# Patient Record
Sex: FEMALE | Race: WHITE | NOT HISPANIC OR LATINO | Employment: FULL TIME | URBAN - METROPOLITAN AREA
[De-identification: names, ages, dates, MRNs, and addresses within clinical notes are randomized per-mention and may not be internally consistent; named-entity substitution may affect disease eponyms.]

---

## 2020-03-30 ENCOUNTER — OFFICE VISIT CONVERTED (OUTPATIENT)
Dept: OTHER | Facility: HOSPITAL | Age: 45
End: 2020-03-30
Attending: NURSE PRACTITIONER

## 2020-05-13 ENCOUNTER — OFFICE VISIT CONVERTED (OUTPATIENT)
Dept: ORTHOPEDIC SURGERY | Facility: CLINIC | Age: 45
End: 2020-05-13
Attending: ORTHOPAEDIC SURGERY

## 2020-06-03 ENCOUNTER — TELEMEDICINE CONVERTED (OUTPATIENT)
Dept: ORTHOPEDIC SURGERY | Facility: CLINIC | Age: 45
End: 2020-06-03
Attending: PHYSICIAN ASSISTANT

## 2020-07-20 ENCOUNTER — CONVERSION ENCOUNTER (OUTPATIENT)
Dept: ORTHOPEDIC SURGERY | Facility: CLINIC | Age: 45
End: 2020-07-20

## 2020-07-20 ENCOUNTER — OFFICE VISIT CONVERTED (OUTPATIENT)
Dept: ORTHOPEDIC SURGERY | Facility: CLINIC | Age: 45
End: 2020-07-20
Attending: PHYSICIAN ASSISTANT

## 2020-08-17 ENCOUNTER — CONVERSION ENCOUNTER (OUTPATIENT)
Dept: OTHER | Facility: HOSPITAL | Age: 45
End: 2020-08-17

## 2020-09-01 ENCOUNTER — HOSPITAL ENCOUNTER (OUTPATIENT)
Dept: GENERAL RADIOLOGY | Facility: HOSPITAL | Age: 45
Discharge: HOME | End: 2020-09-01
Attending: PHYSICIAN ASSISTANT

## 2020-09-14 ENCOUNTER — OFFICE VISIT CONVERTED (OUTPATIENT)
Dept: ORTHOPEDIC SURGERY | Facility: CLINIC | Age: 45
End: 2020-09-14
Attending: PHYSICIAN ASSISTANT

## 2020-09-14 ENCOUNTER — CONVERSION ENCOUNTER (OUTPATIENT)
Dept: ORTHOPEDIC SURGERY | Facility: CLINIC | Age: 45
End: 2020-09-14

## 2020-09-30 ENCOUNTER — OFFICE VISIT CONVERTED (OUTPATIENT)
Dept: ORTHOPEDIC SURGERY | Facility: CLINIC | Age: 45
End: 2020-09-30
Attending: PHYSICIAN ASSISTANT

## 2020-10-14 ENCOUNTER — OFFICE VISIT CONVERTED (OUTPATIENT)
Dept: ORTHOPEDIC SURGERY | Facility: CLINIC | Age: 45
End: 2020-10-14
Attending: PHYSICIAN ASSISTANT

## 2020-10-14 ENCOUNTER — CONVERSION ENCOUNTER (OUTPATIENT)
Dept: ORTHOPEDIC SURGERY | Facility: CLINIC | Age: 45
End: 2020-10-14

## 2020-10-26 ENCOUNTER — CONVERSION ENCOUNTER (OUTPATIENT)
Dept: ORTHOPEDIC SURGERY | Facility: CLINIC | Age: 45
End: 2020-10-26

## 2020-10-26 ENCOUNTER — OFFICE VISIT CONVERTED (OUTPATIENT)
Dept: ORTHOPEDIC SURGERY | Facility: CLINIC | Age: 45
End: 2020-10-26
Attending: PHYSICIAN ASSISTANT

## 2021-05-10 NOTE — H&P
History and Physical      Patient Name: Beth Mullins   Patient ID: [id number]   Sex: Female   YOB: 1975    Primary Care Provider: Navjot Sims MD    Visit Date: March 30, 2020    Provider: ORALIA Cortés   Location: [facility]   Location Address: [address] [place] [address]  [id number]   Location Phone: [phone]          Chief Complaint  · Evaluation for Respiratory Virus      History Of Present Illness  Beth Mullins is a 44 year old /White female who presents today to the clinic for evaluation of a respiratory virus.   Date of Onset: 03/24/2020   What Symptoms: cough, fever, and shortness of breath   Quality of Symptoms: Dry cough. T-max 99.3   Anything make it worse or better: Nothing   Severity of Symptoms: Moderate   Any known Exposure to COVID-19: Possibly exposed to a coworker with covid19      Patient presents the respiratory virus evaluation center today for cough, congestion, shortness of breath, fever with a T-max of 99.3.  Patient reports this started 6 days ago.  Patient had called her primary care office today and was prescribed Z-Abel, Bromfed, Zofran.  Patient has been taking Tylenol over-the-counter for fevers.       Past Medical History  Anemia, Unspecified; Hypertension         Past Surgical History  Cesarian Section; Hysterectomy         Medication List  fluticasone propionate 50 mcg/actuation nasal spray,suspension; hydrochlorothiazide 12.5 mg oral capsule; lisinopril 10 mg oral tablet; loratadine 10 mg oral tablet         Allergy List  morphine         Family Medical History  Diabetes, unspecified type; Blood Diseases; Family history of certain chronic disabling diseases; arthritis         Social History  Alcohol Use (Current some day); Claustophobic (Unknown); lives with spouse; .; Recreational Drug Use (Never); Student.; Tobacco (Current every day)         Review of  Systems  · Constitutional  o Admits  o : fatigue, fever  o Denies  o : weight gain, weight loss  · HENT  o Admits  o : nasal congestion  o Denies  o : headaches, vertigo, recent head injury, sinus pain, nasal discharge, sore throat, ear pain, ear fullness  · Respiratory  o Admits  o : shortness of breath, cough  o Denies  o : pneumonia, COPD, asthma  · Gastrointestinal  o Denies  o : nausea, vomiting, diarrhea, constipation, abdominal pain  · Integument  o Denies  o : rash  · Neurologic  o Denies  o : headache      Vitals  Date Time BP Position Site L\R Cuff Size HR RR TEMP (F) WT  HT  BMI kg/m2 BSA m2 O2 Sat HC       03/30/2020 02:28 PM      101 - R  98.7     97 %          Physical Examination  · Constitutional  o Appearance  o : no acute distress, well-nourished  · Head and Face  o Head  o :   § Inspection  § : atraumatic, normocephalic  · Eyes  o Eyes  o : extraocular movements intact, no scleral icterus, no conjunctival injection  · Ears, Nose, Mouth and Throat  o Ears  o :   § External Ears  § : normal  § Otoscopic Examination  § : normal tympanic membrane exam  o Nose  o :   § Intranasal Exam  § : sinuses nontender to palpation  o Oral Cavity  o :   § Oral Mucosa  § : moist mucous membranes  o Throat  o :   § Oropharynx  § : normal tonsils, normal oropharynx  · Respiratory  o Respiratory Effort  o : breathing comfortably, symmetric chest rise  o Auscultation of Lungs  o : clear to asculatation bilaterally, no wheezes, rales, or rhonchii  · Cardiovascular  o Heart  o :   § Auscultation of Heart  § : regular rate and rhythm, no murmurs, rubs, or gallops  o Peripheral Vascular System  o :   § Extremities  § : no edema  · Lymphatic  o Neck  o : no lymphadenopathy present  o Supraclavicular Nodes  o : no supraclavicular nodes  · Skin and Subcutaneous Tissue  o General Inspection  o : normal inspection  · Neurologic  o Mental Status Examination  o :   § Orientation  § : grossly oriented to person, place and  time  o Gait and Station  o :   § Gait Screening  § : normal gait  · Psychiatric  o General  o : normal mood and affect          Results  · In-Office Procedures  o Lab procedure  § IOP - Influenza A/B Test (57109)   § Influenza A: Negative   § Influenza B: Positive   § Internal Control Verified?: Yes   § Rapid group A Streptococcus screen (85824)   § Beta Strep Gp A Culture: Negative   § Internal Control Verified?: Yes       Assessment  · Cough     786.2/R05  Patient encouraged to go ahead and take her Z-Abel and Bromfed due to the length of symptoms to help treat possible pneumonia.  · Influenza B     487.1/J10.1  Educated patient on the flu and symptoms that are common. Past the window for Tamiflu.    Discussed symptomatic treatment. Encouraged to get plenty of rest, drink plenty of fluids, enough so that your urine is light yellow or clear like water. Take an over-the-counter pain medicine if needed, such as acetaminophen (Tylenol), ibuprofen (Advil, Motrin), to relieve fever, headache, and muscle aches.    To avoid spreading the flu educated wash your hands regularly, and keep your hands away from your face. Stay home from school, work, and other public     Problems Reconciled  Plan  · Medications  o Medications have been Reconciled  o Transition of Care or Provider Policy  · Instructions  o Chronic conditions reviewed and taken into consideration for today's treatment plan.   o Plan of Care:   o Patient instructed to seek medical attention urgently for new or worsening symptoms.  o Self-monitoring for 14 days. Instructions given.  o Recommends over the counter medications for symptom management.  o Follow-up with PCP in 2-3 days.  o Electronically Identified Patient Education Materials Provided Electronically  · Disposition  o Call or Return if symptoms worsen or persist.  o As Needed for Follow Up            Electronically Signed by: Viridiana Christian, APRN -Author on March 30, 2020 02:52:32 PM

## 2021-05-10 NOTE — H&P
History and Physical      Patient Name: Beth Mullins   Patient ID: [id number]   Sex: Female   YOB: 1975    Referring Provider: Amber BARTON    Visit Date: May 13, 2020    Provider: Don Acuna MD   Location: [facility]   Location Address: [address] [place] [address]  [id number]   Location Phone: [phone]          Chief Complaint  · Left Knee Pain      History Of Present Illness  Beth Mullins is a 44 year old /White female who presents today to [facility].      She's complaining of left knee pain. She woke up 1.5 weeks ago with posterior knee pain. It's on the backside and lateral side. She had an ultrasound through primary doctor because she was concerned about a blood clot due to driving a lot. That came back negative. It showed that she had a Baker's cyst as well.       Past Medical History  Anemia, Unspecified; Hypertension         Past Surgical History  Cesarian Section; Hysterectomy         Medication List  diclofenac sodium 75 mg oral tablet,delayed release (DR/EC); fluticasone propionate 50 mcg/actuation nasal spray,suspension; hydrochlorothiazide 12.5 mg oral capsule; ibuprofen 800 mg oral tablet; lisinopril 10 mg oral tablet; loratadine 10 mg oral tablet         Allergy List  morphine         Family Medical History  Diabetes, unspecified type; Blood Diseases; Family history of certain chronic disabling diseases; arthritis         Social History  Alcohol Use (Current some day); Claustophobic (Unknown); lives with spouse; .; Recreational Drug Use (Never); Student.; Tobacco (Current every day)         Review of Systems  · Constitutional  o Denies  o : fever, chills, weight loss  · Cardiovascular  o Denies  o : chest pain, shortness of breath  · Gastrointestinal  o Denies  o : liver disease, heartburn, nausea, blood in stools  · Genitourinary  o Denies  o : painful urination, blood in urine  · Integument  o Denies  o : rash,  "itching  · Neurologic  o Denies  o : headache, weakness, loss of consciousness  · Musculoskeletal  o Denies  o : painful, swollen joints  · Psychiatric  o Denies  o : drug/alcohol addiction, anxiety, depression      Vitals  Date Time BP Position Site L\R Cuff Size HR RR TEMP (F) WT  HT  BMI kg/m2 BSA m2 O2 Sat        05/13/2020 08:28 AM      102 - R   222lbs 2oz 4'  11\" 44.86 2.05 99 %          Physical Examination  · Constitutional  o Appearance  o : well developed, well-nourished, no obvious deformities present  · Head and Face  o Head  o :   § Inspection  § : normocephalic  o Face  o :   § Inspection  § : no facial lesions  · Eyes  o Conjunctivae  o : conjunctivae normal  o Sclerae  o : sclerae white  · Ears, Nose, Mouth and Throat  o Ears  o :   § External Ears  § : appearance within normal limits  § Hearing  § : intact  o Nose  o :   § External Nose  § : appearance normal  · Neck  o Inspection/Palpation  o : normal appearance  o Range of Motion  o : full range of motion  · Respiratory  o Respiratory Effort  o : breathing unlabored  o Inspection of Chest  o : normal appearance  o Auscultation of Lungs  o : no audible wheezing or rales  · Cardiovascular  o Heart  o : regular rate  · Gastrointestinal  o Abdominal Examination  o : soft and non-tender  · Skin and Subcutaneous Tissue  o General Inspection  o : intact, no rashes  · Psychiatric  o General  o : Alert and oriented x3  o Judgement and Insight  o : judgment and insight intact  o Mood and Affect  o : mood normal, affect appropriate  · Left Knee  o Inspection  o : Full knee ROM. Non-tender about medial and lateral joint line. Negative patellofemoral pain. Tenderness posteriorly on the hamstrings and gastrocnemius. Tenderness along posterolateral side of leg. Negative Lachman and Posterior Sag. Stable to valgus/varus stress. Good strength of the quadriceps, hamstrings, dorsiflexors, and plantar flexors. Sensation grossly intact.   · Injection " Note/Aspiration Note  o Site  o : IM  o Procedure  o : Procedure: After educating the patient, patient gave consent for the procedure. After using alcohol prep, injection was given. The patient tolerated the procedure well.   o Medication  o : 2ml's of 4 mg Dexamethasone   · In Office Procedures  o View  o : LAT/SUNRISE/STANDING   o Site  o : left, knee  o Indication  o : Left knee pain   o Study  o : X-rays ordered, taken in the office, and reviewed today.  o Xray  o : Mild to moderate degenerative changes of patellofemoral joint and medial tibiofemoral compartment.   o Comparative Data  o : No comparative data found          Assessment  · Left knee pain, unspecified chronicity     719.46/M25.562  · Baker's cyst of knee, left     727.51/M71.22      Plan  · Orders  o 2.00 - Dexamethasone Injection 8mg (-4) - - 05/13/2020   Lot [id number] Exp 02 2021 [employer] Administered by CHAYITO Akins MA  o IM/SQ - Injection Fee [facility] (14284) - - 05/13/2020  o Knee (Left) [facility] ([id number]-PY) - 719.46/M25.562 - 05/13/2020  · Medications  o Medications have been Reconciled  o Transition of Care or Provider Policy  · Instructions  o Reviewed the patient's Past Medical, Social, and Family history as well as the ROS at today's visit, no changes.  o Call or return if worsening symptoms.  o Exercise handout given.  o This note was transcribed by Yasmine Callahan. [email or web address]  o We discussed conservative management for now. I'm going to put her on Diclofenac. I gave her some stretching for the hamstrings and IT band. Although Pruitt's cysts are normally asymptomatic, it may be leaking, in which that could cause some acute pain. For that, we are just going to treat conservatively. IM steroid injection. I gave her a prescription for physical therapy. If failing to improve, follow up with me in 4-5 weeks. At that point, we may consider an MRI if failing to improve.  o Electronically Identified Patient Education Materials Provided  Electronically            Electronically Signed by: Yasmine Callahan - , Other -Author on May 18, 2020 10:40:11 AM  Electronically Co-signed by: Don Acuna MD -Reviewer on May 18, 2020 04:40:36 PM

## 2021-05-13 NOTE — PROGRESS NOTES
Progress Note      Patient Name: Beth Mullins   Patient ID: [id number]   Sex: Female   YOB: 1975    Referring Provider: Amber BARTON    Visit Date: October 26, 2020    Provider: Kelsey Wilson PA-C   Location: [facility]   Location Address: [address] [place] [address]  [id number]   Location Phone: [phone]          Chief Complaint  · Left knee pain       History Of Present Illness  Beth Mullins is a 44 year old /White female who presents today to [facility].      She is here for left knee Euflexxa #3/3. She states some improvement so far.       Past Medical History  Anemia, Unspecified; Arthritis; Hypertension; Seasonal allergies         Past Surgical History  Cesarian Section; Hysterectomy         Medication List  cyclobenzaprine 10 mg oral tablet; fluticasone propionate 50 mcg/actuation nasal spray,suspension; hydrochlorothiazide 12.5 mg oral capsule; ibuprofen 800 mg oral tablet; lisinopril 10 mg oral tablet; loratadine 10 mg oral tablet; Medrol (Abel) 4 mg oral tablets,dose pack; nabumetone 750 mg oral tablet         Allergy List  morphine       Allergies Reconciled  Family Medical History  Diabetes, unspecified type; Blood Diseases; Family history of certain chronic disabling diseases; arthritis; Osteoporosis; Family history of Arthritis         Social History  Alcohol Use (Current some day); Claustophobic (Unknown); lives with children; lives with spouse; .; Recreational Drug Use (Never); Student.; Tobacco (Current every day); Working         Review of Systems  · Constitutional  o Denies  o : fever, chills, weight loss  · Cardiovascular  o Denies  o : chest pain, shortness of breath  · Gastrointestinal  o Denies  o : liver disease, heartburn, nausea, blood in stools  · Genitourinary  o Denies  o : painful urination, blood in urine  · Integument  o Denies  o : rash, itching  · Neurologic  o Denies  o : headache, weakness, loss of  consciousness  · Musculoskeletal  o Admits  o : painful, swollen joints  · Psychiatric  o Denies  o : drug/alcohol addiction, anxiety, depression      Vitals  Date Time BP Position Site L\R Cuff Size HR RR TEMP (F) WT  HT  BMI kg/m2 BSA m2 O2 Sat FR L/min FiO2 HC       10/26/2020 10:23 AM      111 - R   222lbs 16oz 5'   43.55 2.07 98 %            Physical Examination  · Constitutional  o Appearance  o : well developed, well-nourished, no obvious deformities present  · Head and Face  o Head  o :   § Inspection  § : normocephalic  o Face  o :   § Inspection  § : no facial lesions  · Eyes  o Conjunctivae  o : conjunctivae normal  o Sclerae  o : sclerae white  · Ears, Nose, Mouth and Throat  o Ears  o :   § External Ears  § : appearance within normal limits  § Hearing  § : intact  o Nose  o :   § External Nose  § : appearance normal  · Neck  o Inspection/Palpation  o : normal appearance  o Range of Motion  o : full range of motion  · Respiratory  o Respiratory Effort  o : breathing unlabored  o Inspection of Chest  o : normal appearance  o Auscultation of Lungs  o : no audible wheezing or rales  · Cardiovascular  o Heart  o : regular rate  · Gastrointestinal  o Abdominal Examination  o : soft and non-tender  · Skin and Subcutaneous Tissue  o General Inspection  o : intact, no rashes  · Psychiatric  o General  o : Alert and oriented x3  o Judgement and Insight  o : judgment and insight intact  o Mood and Affect  o : mood normal, affect appropriate  · Left Knee  o Inspection  o : Full knee ROM. Non-tender about medial and lateral joint line. Negative patellofemoral pain. Tenderness posteriorly on the hamstrings and gastrocnemius. Tenderness along posterolateral side of leg. Negative Lachman and Posterior Sag. Stable to valgus/varus stress. + Thessaly. Good strength of the quadriceps, hamstrings, dorsiflexors, and plantar flexors. Sensation grossly intact.   · Injection Note/Aspiration Note  o Site  o : left  knee  o Procedure  o : Procedure: After educating the patient, patient gave consent for procedure. After using Chloraprep, the joint space was injected. The patient tolerated the procedure well.  o Medication  o : Euflexxa, 20 mg              Assessment  · Primary osteoarthritis of left knee     715.16/M17.12      Plan  · Orders  o Euflexxa per dose (Patient supplies med) () - 715.16/M17.12 - 10/26/2020   Lot [id number] Exp 05 31 2021 [employer] Administered by KELSEY PALENCIA PA-C   o Knee Intra-articular Injection without US [facility] (01024) - 715.16/M17.12 - 10/26/2020   Administered by KELSEY PALENCIA PA-C   · Medications  o Medications have been Reconciled  o Transition of Care or Provider Policy  · Instructions  o Reviewed the patient's Past Medical, Social, and Family history as well as the ROS at today's visit, no changes.  o Call or return if worsening symptoms.  o Follow up PRN. She would like to avoid surgery if possible.   o Electronically Identified Patient Education Materials Provided Electronically            Electronically Signed by: Kelsey Palencia PA-C -Author on October 26, 2020 10:31:50 AM  Electronically Co-signed by: Dno Acuna MD -Reviewer on October 26, 2020 08:50:32 PM

## 2021-05-13 NOTE — PROGRESS NOTES
Progress Note      Patient Name: Beth Mullins   Patient ID: [id number]   Sex: Female   YOB: 1975    Referring Provider: Amber BARTON    Visit Date: Janet 3, 2020    Provider: Kelsey Wilson PA-C   Location: [facility]   Location Address: [address] [place] [address]  [id number]   Location Phone: [phone]          Chief Complaint  · Left Knee pain      History Of Present Illness  Video Conferencing Visit  Beth Mullins is a 44 year old /White female who is presenting for evaluation via video conferencing via Zoom. Verbal consent obtained before beginning visit.   The following staff were present during this visit: Kelsey Wilson PA-C      She is here for follow up for left knee pain. She states IM injection helped for 2 days. She bent down a couple days after her visit and felt a pop in the back of her knee. She states posterior pain and tightness in anterior knee.       Past Medical History  Anemia, Unspecified; Hypertension         Past Surgical History  Cesarian Section; Hysterectomy         Medication List  diclofenac sodium 75 mg oral tablet,delayed release (DR/EC); fluticasone propionate 50 mcg/actuation nasal spray,suspension; hydrochlorothiazide 12.5 mg oral capsule; ibuprofen 800 mg oral tablet; lisinopril 10 mg oral tablet; loratadine 10 mg oral tablet         Allergy List  morphine       Allergies Reconciled  Family Medical History  Diabetes, unspecified type; Blood Diseases; Family history of certain chronic disabling diseases; arthritis         Social History  Alcohol Use (Current some day); Claustophobic (Unknown); lives with spouse; .; Recreational Drug Use (Never); Student.; Tobacco (Current every day)         Review of Systems  · Constitutional  o Denies  o : fever, chills, weight loss  · Cardiovascular  o Denies  o : chest pain, shortness of breath  · Gastrointestinal  o Denies  o : liver disease, heartburn, nausea, blood in  "stools  · Genitourinary  o Denies  o : painful urination, blood in urine  · Integument  o Denies  o : rash, itching  · Neurologic  o Denies  o : headache, weakness, loss of consciousness  · Musculoskeletal  o Admits  o : painful, swollen joints  · Psychiatric  o Denies  o : drug/alcohol addiction, anxiety, depression      Vitals  Date Time BP Position Site L\R Cuff Size HR RR TEMP (F) WT  HT  BMI kg/m2 BSA m2 O2 Sat HC       06/03/2020 08:45 AM         223lbs 16oz 4'  11\" 45.24 2.06           Physical Examination  · Constitutional  o Appearance  o : well developed, well-nourished, no obvious deformities present  · Head and Face  o Head  o :   § Inspection  § : normocephalic  o Face  o :   § Inspection  § : no facial lesions  · Eyes  o Conjunctivae  o : conjunctivae normal  o Sclerae  o : sclerae white  · Ears, Nose, Mouth and Throat  o Ears  o :   § External Ears  § : appearance within normal limits  § Hearing  § : intact  o Nose  o :   § External Nose  § : appearance normal  · Neck  o Inspection/Palpation  o : normal appearance  o Range of Motion  o : full range of motion  · Respiratory  o Respiratory Effort  o : breathing unlabored  o Inspection of Chest  o : normal appearance  o Auscultation of Lungs  o : no audible wheezing or rales  · Skin and Subcutaneous Tissue  o General Inspection  o : intact, no rashes  · Psychiatric  o General  o : Alert and oriented x3  o Judgement and Insight  o : judgment and insight intact  o Mood and Affect  o : mood normal, affect appropriate  · Left Knee  o Inspection  o : No gross deformity. Full weight bearing.           Assessment  · Left knee pain, unspecified chronicity     719.46/M25.562      Plan  · Medications  o Medications have been Reconciled  o Transition of Care or Provider Policy  · Instructions  o Reviewed the patient's Past Medical, Social, and Family history as well as the ROS at today's visit, no changes.  o Call or return if worsening symptoms.  o Follow up for " left knee injection.   o Electronically Identified Patient Education Materials Provided Electronically            Electronically Signed by: MARCO ANTONIO Kaur-JEROD -Author on Janet 3, 2020 09:47:29 AM  Electronically Co-signed by: Don Acuna MD -Reviewer on June 8, 2020 09:43:19 AM

## 2021-05-13 NOTE — PROGRESS NOTES
Progress Note      Patient Name: Beth Mullins   Patient ID: [id number]   Sex: Female   YOB: 1975    Referring Provider: Amber BARTON    Visit Date: July 20, 2020    Provider: Kelsey Wilson PA-C   Location: [facility]   Location Address: [address] [place] [address]  [id number]   Location Phone: [phone]          Chief Complaint  · Left knee pain      History Of Present Illness  Beth Mullins is a 44 year old /White female who presents today to [facility].      She is here for left knee pain. She state IM injection wore off after a day. She has Baker's cyst. Her pain is medial. She states pain with walking that does not relent.       Past Medical History  Anemia, Unspecified; Hypertension         Past Surgical History  Cesarian Section; Hysterectomy         Medication List  diclofenac sodium 75 mg oral tablet,delayed release (DR/EC); fluticasone propionate 50 mcg/actuation nasal spray,suspension; hydrochlorothiazide 12.5 mg oral capsule; ibuprofen 800 mg oral tablet; lisinopril 10 mg oral tablet; loratadine 10 mg oral tablet         Allergy List  morphine       Allergies Reconciled  Family Medical History  Diabetes, unspecified type; Blood Diseases; Family history of certain chronic disabling diseases; arthritis         Social History  Alcohol Use (Current some day); Claustophobic (Unknown); lives with spouse; .; Recreational Drug Use (Never); Student.; Tobacco (Current every day)         Review of Systems  · Constitutional  o Denies  o : fever, chills, weight loss  · Cardiovascular  o Denies  o : chest pain, shortness of breath  · Gastrointestinal  o Denies  o : liver disease, heartburn, nausea, blood in stools  · Genitourinary  o Denies  o : painful urination, blood in urine  · Integument  o Denies  o : rash, itching  · Neurologic  o Denies  o : headache, weakness, loss of consciousness  · Musculoskeletal  o Admits  o : painful, swollen  "joints  · Psychiatric  o Denies  o : drug/alcohol addiction, anxiety, depression      Vitals  Date Time BP Position Site L\R Cuff Size HR RR TEMP (F) WT  HT  BMI kg/m2 BSA m2 O2 Sat HC       07/20/2020 01:04 PM      85 - R   224lbs 5oz 4'  11.5\" 44.55 2.07 99 %          Physical Examination  · Constitutional  o Appearance  o : well developed, well-nourished, no obvious deformities present  · Head and Face  o Head  o :   § Inspection  § : normocephalic  o Face  o :   § Inspection  § : no facial lesions  · Eyes  o Conjunctivae  o : conjunctivae normal  o Sclerae  o : sclerae white  · Ears, Nose, Mouth and Throat  o Ears  o :   § External Ears  § : appearance within normal limits  § Hearing  § : intact  o Nose  o :   § External Nose  § : appearance normal  · Neck  o Inspection/Palpation  o : normal appearance  o Range of Motion  o : full range of motion  · Respiratory  o Respiratory Effort  o : breathing unlabored  o Inspection of Chest  o : normal appearance  o Auscultation of Lungs  o : no audible wheezing or rales  · Cardiovascular  o Heart  o : regular rate  · Gastrointestinal  o Abdominal Examination  o : soft and non-tender  · Skin and Subcutaneous Tissue  o General Inspection  o : intact, no rashes  · Psychiatric  o General  o : Alert and oriented x3  o Judgement and Insight  o : judgment and insight intact  o Mood and Affect  o : mood normal, affect appropriate  · Left Knee  o Inspection  o : Full knee ROM. Non-tender about medial and lateral joint line. Negative patellofemoral pain. Tenderness posteriorly on the hamstrings and gastrocnemius. Tenderness along posterolateral side of leg. Negative Lachman and Posterior Sag. Stable to valgus/varus stress. + Thessaly. Good strength of the quadriceps, hamstrings, dorsiflexors, and plantar flexors. Sensation grossly intact.   · Injection Note/Aspiration Note  o Site  o : left knee  o Procedure  o : Procedure: After educating the patient, patient gave consent for " procedure. After using Chloraprep, the joint space was injected. The patient tolerated the procedure well.  o Medication  o : 80 mg of DepoMedrol with 9cc of 1% Lidocaine              Assessment  · Primary osteoarthritis of left knee     715.16/M17.12  · Left knee pain, unspecified chronicity     719.46/M25.562      Plan  · Orders  o Depo-Medrol injection 80mg () - 719.46/M25.562 - 07/20/2020   Lot [id number] Exp 06 2021 [employer] Administered by KELSEY PALENCIA PA-C  o Knee Intra-articular Injection without US Guidance [facility] ([id number]) - 719.46/M25.562 - 07/20/2020   lot [id number] Exp 07 2021 [employer] Administered by KELSEY PALENCIA PA-C  · Medications  o Medications have been Reconciled  o Transition of Care or Provider Policy  · Instructions  o Reviewed the patient's Past Medical, Social, and Family history as well as the ROS at today's visit, no changes.  o Call or return if worsening symptoms.  o Left knee injection today. Continue HEP. Follow up 3-4 weeks. May call to schedule MRI if no improvement.   o Electronically Identified Patient Education Materials Provided Electronically            Electronically Signed by: Kelsey Palencia PA-C -Author on July 20, 2020 01:18:38 PM

## 2021-05-13 NOTE — PROGRESS NOTES
Progress Note      Patient Name: Beth Mullins   Patient ID: [id number]   Sex: Female   YOB: 1975    Referring Provider: Amber BARTON    Visit Date: October 14, 2020    Provider: Kelsey Wilson PA-C   Location: [facility]   Location Address: [address] [place] [address]  [id number]   Location Phone: [phone]          Chief Complaint  · Follow up left knee pain      History Of Present Illness  Beth Mullins is a 44 year old /White female who presents today to [facility].      She is here for #2/3 Euflexxa injection of left knee.       Past Medical History  Anemia, Unspecified; Arthritis; Hypertension; Seasonal allergies         Past Surgical History  Cesarian Section; Hysterectomy         Medication List  cyclobenzaprine 10 mg oral tablet; fluticasone propionate 50 mcg/actuation nasal spray,suspension; hydrochlorothiazide 12.5 mg oral capsule; ibuprofen 800 mg oral tablet; lisinopril 10 mg oral tablet; loratadine 10 mg oral tablet; Medrol (Able) 4 mg oral tablets,dose pack; nabumetone 750 mg oral tablet         Allergy List  morphine       Allergies Reconciled  Family Medical History  Diabetes, unspecified type; Blood Diseases; Family history of certain chronic disabling diseases; arthritis; Osteoporosis; Family history of Arthritis         Social History  Alcohol Use (Current some day); Claustophobic (Unknown); lives with children; lives with spouse; .; Recreational Drug Use (Never); Student.; Tobacco (Current every day); Working         Review of Systems  · Constitutional  o Denies  o : fever, chills, weight loss  · Cardiovascular  o Denies  o : chest pain, shortness of breath  · Gastrointestinal  o Denies  o : liver disease, heartburn, nausea, blood in stools  · Genitourinary  o Denies  o : painful urination, blood in urine  · Integument  o Denies  o : rash, itching  · Neurologic  o Denies  o : headache, weakness, loss of  "consciousness  · Musculoskeletal  o Admits  o : painful, swollen joints  · Psychiatric  o Denies  o : drug/alcohol addiction, anxiety, depression      Vitals  Date Time BP Position Site L\R Cuff Size HR RR TEMP (F) WT  HT  BMI kg/m2 BSA m2 O2 Sat FR L/min FiO2        10/14/2020 09:12 AM      94 - R   221lbs 8oz 4'  11.5\" 43.99 2.05 96 %            Physical Examination  · Constitutional  o Appearance  o : well developed, well-nourished, no obvious deformities present  · Head and Face  o Head  o :   § Inspection  § : normocephalic  o Face  o :   § Inspection  § : no facial lesions  · Eyes  o Conjunctivae  o : conjunctivae normal  o Sclerae  o : sclerae white  · Ears, Nose, Mouth and Throat  o Ears  o :   § External Ears  § : appearance within normal limits  § Hearing  § : intact  o Nose  o :   § External Nose  § : appearance normal  · Neck  o Inspection/Palpation  o : normal appearance  o Range of Motion  o : full range of motion  · Respiratory  o Respiratory Effort  o : breathing unlabored  o Inspection of Chest  o : normal appearance  o Auscultation of Lungs  o : no audible wheezing or rales  · Cardiovascular  o Heart  o : regular rate  · Gastrointestinal  o Abdominal Examination  o : soft and non-tender  · Skin and Subcutaneous Tissue  o General Inspection  o : intact, no rashes  · Psychiatric  o General  o : Alert and oriented x3  o Judgement and Insight  o : judgment and insight intact  o Mood and Affect  o : mood normal, affect appropriate  · Left Knee  o Inspection  o : Full knee ROM. Non-tender about medial and lateral joint line. Negative patellofemoral pain. Tenderness posteriorly on the hamstrings and gastrocnemius. Tenderness along posterolateral side of leg. Negative Lachman and Posterior Sag. Stable to valgus/varus stress. + Thessaly. Good strength of the quadriceps, hamstrings, dorsiflexors, and plantar flexors. Sensation grossly intact.   · Injection Note/Aspiration Note  o Site  o : left knee "   o Procedure  o : Procedure: After educating the patient, patient gave consent for procedure. After using Chloraprep, the joint space was injected. The patient tolerated the procedure well.   o Medication  o : Euflexxa, 20 mg           Assessment  · Primary osteoarthritis of left knee     715.16/M17.12      Plan  · Orders  o Euflexxa per dose () - 715.16/M17.12 - 10/14/2020   Lot [id number] exp 05 31 2021 Norman BERNARD  o Knee Intra-articular Injection without US Guidance [facility] ([id number]) - 715.16/M17.12 - 10/14/2020   Kelsey BERNARD  · Medications  o Medications have been Reconciled  o Transition of Care or Provider Policy  · Instructions  o Reviewed the patient's Past Medical, Social, and Family history as well as the ROS at today's visit, no changes.  o Call or return if worsening symptoms.  o Follow up in 1 week.  o Electronically Identified Patient Education Materials Provided Electronically            Electronically Signed by: MARCO ANTONIO Kaur-C -Author on October 14, 2020 09:38:01 AM  Electronically Co-signed by: Don Acuna MD -Reviewer on October 14, 2020 12:30:56 PM

## 2021-05-13 NOTE — PROGRESS NOTES
Progress Note      Patient Name: Beth Mullins   Patient ID: [id number]   Sex: Female   YOB: 1975    Referring Provider: Amber BARTON    Visit Date: September 14, 2020    Provider: Kelsey Wilson PA-C   Location: [facility]   Location Address: [address] [place] [address]  [id number]   Location Phone: [phone]          History Of Present Illness  Beth Mullins is a 44 year old /White female who presents today to [facility].      She is here for follow up left knee MRI, it revealed significant chondromalacia of medial and patellofemoral compartments and MMT. She had no relief with steroid injection.       Past Medical History  Anemia, Unspecified; Hypertension         Past Surgical History  Cesarian Section; Hysterectomy         Medication List  cyclobenzaprine 10 mg oral tablet; diclofenac sodium 75 mg oral tablet,delayed release (DR/EC); fluticasone propionate 50 mcg/actuation nasal spray,suspension; hydrochlorothiazide 12.5 mg oral capsule; ibuprofen 800 mg oral tablet; lisinopril 10 mg oral tablet; loratadine 10 mg oral tablet; Medrol (Abel) 4 mg oral tablets,dose pack; nabumetone 750 mg oral tablet         Allergy List  morphine       Allergies Reconciled  Family Medical History  Diabetes, unspecified type; Blood Diseases; Family history of certain chronic disabling diseases; arthritis         Social History  Alcohol Use (Current some day); Claustophobic (Unknown); lives with spouse; .; Recreational Drug Use (Never); Student.; Tobacco (Current every day)         Review of Systems  · Constitutional  o Denies  o : fever, chills, weight loss  · Cardiovascular  o Denies  o : chest pain, shortness of breath  · Gastrointestinal  o Denies  o : liver disease, heartburn, nausea, blood in stools  · Genitourinary  o Denies  o : painful urination, blood in urine  · Integument  o Denies  o : rash, itching  · Neurologic  o Denies  o : headache,  weakness, loss of consciousness  · Musculoskeletal  o Admits  o : painful, swollen joints  · Psychiatric  o Denies  o : drug/alcohol addiction, anxiety, depression      Vitals  Date Time BP Position Site L\R Cuff Size HR RR TEMP (F) WT  HT  BMI kg/m2 BSA m2 O2 Sat HC       09/14/2020 11:11 AM      95 - R   222lbs 16oz 5'   43.55 2.07 98 %          Physical Examination  · Constitutional  o Appearance  o : well developed, well-nourished, no obvious deformities present  · Head and Face  o Head  o :   § Inspection  § : normocephalic  o Face  o :   § Inspection  § : no facial lesions  · Eyes  o Conjunctivae  o : conjunctivae normal  o Sclerae  o : sclerae white  · Ears, Nose, Mouth and Throat  o Ears  o :   § External Ears  § : appearance within normal limits  § Hearing  § : intact  o Nose  o :   § External Nose  § : appearance normal  · Neck  o Inspection/Palpation  o : normal appearance  o Range of Motion  o : full range of motion  · Respiratory  o Respiratory Effort  o : breathing unlabored  o Inspection of Chest  o : normal appearance  o Auscultation of Lungs  o : no audible wheezing or rales  · Cardiovascular  o Heart  o : regular rate  · Gastrointestinal  o Abdominal Examination  o : soft and non-tender  · Skin and Subcutaneous Tissue  o General Inspection  o : intact, no rashes  · Psychiatric  o General  o : Alert and oriented x3  o Judgement and Insight  o : judgment and insight intact  o Mood and Affect  o : mood normal, affect appropriate  · Left Knee  o Inspection  o : Full knee ROM. Non-tender about medial and lateral joint line. Negative patellofemoral pain. Tenderness posteriorly on the hamstrings and gastrocnemius. Tenderness along posterolateral side of leg. Negative Lachman and Posterior Sag. Stable to valgus/varus stress. + Thessaly. Good strength of the quadriceps, hamstrings, dorsiflexors, and plantar flexors. Sensation grossly intact.   · Imaging  o Imaging  o : MRI left knee 9/1/20: CONCLUSION: 1.  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 2. Full-thickness radial tear through the medial meniscus posterior root with mild medial protrusion of the meniscal body. 3. Moderate to advanced chondromalacia in the patellofemoral compartment. 4. Moderate joint effusion with diffuse synovial thickening and/or intra-articular debris. 5. Heterogeneous bone marrow signal, likely reflecting red marrow reconversion. Consider correlating with CBC.          Assessment  · Primary osteoarthritis of left knee     715.16/M17.12  · MMT (medial meniscus tear)     836.0/S83.249A  · Left knee pain, unspecified chronicity     719.46/M25.562      Plan  · Medications  o Medications have been Reconciled  o Transition of Care or Provider Policy  · Instructions  o Reviewed the patient's Past Medical, Social, and Family history as well as the ROS at today's visit, no changes.  o Call or return if worsening symptoms.  o Seek prior auth for gel injection. Knee brace provided.   o Electronically Identified Patient Education Materials Provided Electronically            Electronically Signed by: MARCO ANTONIO Kaur-JEROD -Author on September 14, 2020 11:38:52 AM  Electronically Co-signed by: Don Acuna MD -Reviewer on September 14, 2020 05:30:02 PM

## 2021-05-13 NOTE — PROGRESS NOTES
Progress Note      Patient Name: Beth Mullins   Patient ID: [id number]   Sex: Female   YOB: 1975    Referring Provider: Amber BARTON    Visit Date: September 30, 2020    Provider: Kelsey Wilson PA-C   Location: [facility]   Location Address: [address] [place] [address]  [id number]   Location Phone: [phone]          Chief Complaint  · Follow up left knee pain      History Of Present Illness  Beth Mullins is a 44 year old /White female who presents today to [facility].      She is here for left knee Euflexxa injection #1/3.       Past Medical History  Anemia, Unspecified; Arthritis; Hypertension; Seasonal allergies         Past Surgical History  Cesarian Section; Hysterectomy         Medication List  cyclobenzaprine 10 mg oral tablet; fluticasone propionate 50 mcg/actuation nasal spray,suspension; hydrochlorothiazide 12.5 mg oral capsule; ibuprofen 800 mg oral tablet; lisinopril 10 mg oral tablet; loratadine 10 mg oral tablet; Medrol (Abel) 4 mg oral tablets,dose pack; nabumetone 750 mg oral tablet         Allergy List  morphine       Allergies Reconciled  Family Medical History  Diabetes, unspecified type; Blood Diseases; Family history of certain chronic disabling diseases; arthritis; Osteoporosis; Family history of Arthritis         Social History  Alcohol Use (Current some day); Claustophobic (Unknown); lives with children; lives with spouse; .; Recreational Drug Use (Never); Student.; Tobacco (Current every day); Working         Review of Systems  · Constitutional  o Denies  o : fever, chills, weight loss  · Cardiovascular  o Denies  o : chest pain, shortness of breath  · Gastrointestinal  o Denies  o : liver disease, heartburn, nausea, blood in stools  · Genitourinary  o Denies  o : painful urination, blood in urine  · Integument  o Denies  o : rash, itching  · Neurologic  o Denies  o : headache, weakness, loss of  consciousness  · Musculoskeletal  o Admits  o : painful, swollen joints  · Psychiatric  o Denies  o : drug/alcohol addiction, anxiety, depression      Vitals  Date Time BP Position Site L\R Cuff Size HR RR TEMP (F) WT  HT  BMI kg/m2 BSA m2 O2 Sat FR L/min FiO2 HC       09/30/2020 07:44 AM      95 - R   222lbs 16oz 5'   43.55 2.07 97 %            Physical Examination  · Constitutional  o Appearance  o : well developed, well-nourished, no obvious deformities present  · Head and Face  o Head  o :   § Inspection  § : normocephalic  o Face  o :   § Inspection  § : no facial lesions  · Eyes  o Conjunctivae  o : conjunctivae normal  o Sclerae  o : sclerae white  · Ears, Nose, Mouth and Throat  o Ears  o :   § External Ears  § : appearance within normal limits  § Hearing  § : intact  o Nose  o :   § External Nose  § : appearance normal  · Neck  o Inspection/Palpation  o : normal appearance  o Range of Motion  o : full range of motion  · Respiratory  o Respiratory Effort  o : breathing unlabored  o Inspection of Chest  o : normal appearance  o Auscultation of Lungs  o : no audible wheezing or rales  · Cardiovascular  o Heart  o : regular rate  · Gastrointestinal  o Abdominal Examination  o : soft and non-tender  · Skin and Subcutaneous Tissue  o General Inspection  o : intact, no rashes  · Psychiatric  o General  o : Alert and oriented x3  o Judgement and Insight  o : judgment and insight intact  o Mood and Affect  o : mood normal, affect appropriate  · Left Knee  o Inspection  o : Full knee ROM. Non-tender about medial and lateral joint line. Negative patellofemoral pain. Tenderness posteriorly on the hamstrings and gastrocnemius. Tenderness along posterolateral side of leg. Negative Lachman and Posterior Sag. Stable to valgus/varus stress. + Thessaly. Good strength of the quadriceps, hamstrings, dorsiflexors, and plantar flexors. Sensation grossly intact.   · Injection Note/Aspiration Note  o Site  o : left knee    o Procedure  o : Procedure: After educating the patient, patient gave consent for procedure. After using Chloraprep, the joint space was injected. The patient tolerated the procedure well.   o Medication  o : Euflexxa, 20 mg           Assessment  · Primary osteoarthritis of left knee     715.16/M17.12      Plan  · Orders  o Euflexxa per dose () - 715.16/M17.12 - 09/30/2020   Lot [id number] exp 05 31 2021 Norman BERNARD  o Knee Intra-articular Injection without US Guidance [facility] (02411) - 715.16/M17.12 - 09/30/2020   Kelsey BERNARD  · Medications  o Medications have been Reconciled  o Transition of Care or Provider Policy  · Instructions  o Reviewed the patient's Past Medical, Social, and Family history as well as the ROS at today's visit, no changes.  o Call or return if worsening symptoms.  o Follow up in 1 week.  o Electronically Identified Patient Education Materials Provided Electronically            Electronically Signed by: MARCO ANTONIO Kaur-C -Author on September 30, 2020 08:28:23 AM  Electronically Co-signed by: Don Acuna MD -Reviewer on September 30, 2020 11:01:16 PM

## 2021-05-14 VITALS — OXYGEN SATURATION: 98 % | HEIGHT: 60 IN | HEART RATE: 111 BPM | WEIGHT: 223 LBS | BODY MASS INDEX: 43.78 KG/M2

## 2021-05-14 VITALS — WEIGHT: 223 LBS | BODY MASS INDEX: 43.78 KG/M2 | HEART RATE: 95 BPM | OXYGEN SATURATION: 98 % | HEIGHT: 60 IN

## 2021-05-14 VITALS — HEART RATE: 94 BPM | OXYGEN SATURATION: 96 % | HEIGHT: 59 IN | BODY MASS INDEX: 44.65 KG/M2 | WEIGHT: 221.5 LBS

## 2021-05-14 VITALS — HEIGHT: 60 IN | OXYGEN SATURATION: 97 % | HEART RATE: 95 BPM | BODY MASS INDEX: 43.78 KG/M2 | WEIGHT: 223 LBS

## 2021-05-15 VITALS — HEART RATE: 98 BPM | WEIGHT: 220.5 LBS | HEIGHT: 60 IN | OXYGEN SATURATION: 98 % | BODY MASS INDEX: 43.29 KG/M2

## 2021-05-15 VITALS — OXYGEN SATURATION: 97 % | HEART RATE: 101 BPM | TEMPERATURE: 98.7 F

## 2021-05-15 VITALS — WEIGHT: 224.31 LBS | OXYGEN SATURATION: 99 % | HEIGHT: 59 IN | BODY MASS INDEX: 45.22 KG/M2 | HEART RATE: 85 BPM

## 2021-05-15 VITALS — WEIGHT: 222.12 LBS | BODY MASS INDEX: 44.78 KG/M2 | HEART RATE: 102 BPM | OXYGEN SATURATION: 99 % | HEIGHT: 59 IN

## 2021-05-15 VITALS — HEIGHT: 59 IN | WEIGHT: 224 LBS | BODY MASS INDEX: 45.16 KG/M2

## 2021-05-26 ENCOUNTER — TRANSCRIBE ORDERS (OUTPATIENT)
Dept: LAB | Facility: HOSPITAL | Age: 46
End: 2021-05-26

## 2021-05-26 ENCOUNTER — OFFICE VISIT CONVERTED (OUTPATIENT)
Dept: ORTHOPEDIC SURGERY | Facility: CLINIC | Age: 46
End: 2021-05-26
Attending: ORTHOPAEDIC SURGERY

## 2021-05-26 DIAGNOSIS — Z01.818 PRE-OP TESTING: Primary | ICD-10-CM

## 2021-05-27 ENCOUNTER — TRANSCRIBE ORDERS (OUTPATIENT)
Dept: PHYSICAL THERAPY | Facility: CLINIC | Age: 46
End: 2021-05-27

## 2021-05-27 DIAGNOSIS — Z96.652 AFTERCARE FOLLOWING LEFT KNEE JOINT REPLACEMENT SURGERY: ICD-10-CM

## 2021-05-27 DIAGNOSIS — Z96.652 PRESENCE OF LEFT ARTIFICIAL KNEE JOINT: Primary | ICD-10-CM

## 2021-05-27 DIAGNOSIS — Z47.1 AFTERCARE FOLLOWING LEFT KNEE JOINT REPLACEMENT SURGERY: ICD-10-CM

## 2021-06-05 NOTE — PROGRESS NOTES
Progress Note      Patient Name: Beth Mullins   Patient ID: [id number]   Sex: Female   YOB: 1975    Referring Provider: Amber BARTON    Visit Date: May 26, 2021    Provider: Don Acuna MD   Location: [facility]   Location Address: [address] [place] [address]  [id number]   Location Phone: [phone]          Chief Complaint  · Left Knee Osteoarthritis      History Of Present Illness  Beth Mullins is a 45 year old /White female who presents today to [facility].      Patient presents today for a follow-up of left knee. Patient has a history of left knee osteoarthritis that is treated conservatively. The last injections she received in October did not provide her with any relief. She states her left knee pain has been increasing since she has last seen us. She states the quality of her life has plummeted. She is unable to walk around the grocery store without having to sit down. She states after her grocery store trips she is unable to walk the rest of the day. The pain in her left knee is worse at night. She states she has trouble with her knee at work and fears that it will begin to affect her work quality. She states her knee gives way on her and causes her to fall. Patient has left knee pain that limits everyday activity, falls, and difficulty getting out of an automobile, difficulty sleeping, limited activities of daily living such as bathing, dressing, walking, difficulty climbing stairs, and difficulty sitting for long periods of time. Patient works as a .       Past Medical History  Anemia, Unspecified; Arthritis; Hypertension; Seasonal allergies         Past Surgical History  Cesarian Section; Hysterectomy         Medication List  cyclobenzaprine 10 mg oral tablet; fluticasone propionate 50 mcg/actuation nasal spray,suspension; hydrochlorothiazide 12.5 mg oral capsule; ibuprofen 800 mg oral tablet; lisinopril 10 mg oral tablet;  loratadine 10 mg oral tablet; Medrol (Abel) 4 mg oral tablets,dose pack; NABUMETONE 750MG TABLET         Allergy List  morphine       Allergies Reconciled  Family Medical History  Diabetes, unspecified type; Blood Diseases; Family history of certain chronic disabling diseases; arthritis; Osteoporosis; Family history of Arthritis         Social History  Alcohol Use (Current some day); Claustophobic (Unknown); lives with children; lives with spouse; .; Recreational Drug Use (Never); Student.; Tobacco (Current every day); Working         Review of Systems  · Constitutional  o Denies  o : fever, chills, weight loss  · Cardiovascular  o Denies  o : chest pain, shortness of breath  · Gastrointestinal  o Denies  o : liver disease, heartburn, nausea, blood in stools  · Genitourinary  o Denies  o : painful urination, blood in urine  · Integument  o Denies  o : rash, itching  · Neurologic  o Denies  o : headache, weakness, loss of consciousness  · Musculoskeletal  o Denies  o : painful, swollen joints  · Psychiatric  o Denies  o : drug/alcohol addiction, anxiety, depression      Vitals  Date Time BP Position Site L\R Cuff Size HR RR TEMP (F) WT  HT  BMI kg/m2 BSA m2 O2 Sat FR L/min FiO2 HC       05/26/2021 09:31 AM         222lbs 16oz 5'   43.55 2.07             Physical Examination  · Constitutional  o Appearance  o : well developed, well-nourished, no obvious deformities present  · Head and Face  o Head  o :   § Inspection  § : normocephalic  o Face  o :   § Inspection  § : no facial lesions  · Eyes  o Conjunctivae  o : conjunctivae normal  o Sclerae  o : sclerae white  · Ears, Nose, Mouth and Throat  o Ears  o :   § External Ears  § : appearance within normal limits  § Hearing  § : intact  o Nose  o :   § External Nose  § : appearance normal  · Neck  o Inspection/Palpation  o : normal appearance  o Range of Motion  o : full range of motion  · Respiratory  o Respiratory Effort  o : breathing unlabored  o Inspection of  Chest  o : normal appearance  o Auscultation of Lungs  o : no audible wheezing or rales  · Cardiovascular  o Heart  o : regular rate  · Gastrointestinal  o Abdominal Examination  o : soft and non-tender  · Skin and Subcutaneous Tissue  o General Inspection  o : intact, no rashes  · Psychiatric  o General  o : Alert and oriented x3  o Judgement and Insight  o : judgment and insight intact  o Mood and Affect  o : mood normal, affect appropriate  · Left Knee  o Inspection  o : Tenderness posteriorly on the hamstrings and gastrocnemius. Tenderness along posterolateral side of leg. Tender medial and lateral joint line. Positive Thessaly. Negative Lachman. Negative posterior sag. Stable to valgus/varus stress. Good strength in quadriceps, hamstrings, dorsiflexors, and plantar flexors. Sensation grossly intact. Neurovascular intact. Full weight bearing. Limping gait. Skin intact. No swelling, skin discoloration or atrophy. Positive crepitus. Full extension. Flexion to 125.   · In Office Procedures  o View  o : LAT/SUNRISE/STANDING   o Site  o : left, knee  o Indication  o : Left Knee Osteoarthritis  o Study  o : X-rays ordered, taken in the office, and reviewed today.  o Xray  o : Advanced degenerative changes of the left knee that is consistent with bone on bone osteoarthritis. No fracture or dislocation.           Assessment  · Primary osteoarthritis of left knee     715.16/M17.12      Plan  · Orders  o Knee (Left) 3 views X-Ray [facility] Preferred View ([id number]) - 715.16/M17.12 - 05/26/2021  o ACO-17: Screened for tobacco use AND received tobacco cessation intervention (4004F) - - 05/27/2021  · Medications  o Medications have been Reconciled  o Transition of Care or Provider Policy  · Instructions  o Dr. Acuna saw and examined the patient and agrees with plan.   o X-rays reviewed by Dr. Acuna.  o Reviewed the patient's Past Medical, Social, and Family history as well as the ROS at today's visit, no changes.  o Call or return  if worsening symptoms.  o Discussed surgery.  o Risks/benefits discussed with patient including, but not limited to: infection, bleeding, neurovascular damage, malunion, nonunion, aesthetic deformity, need for further surgery, and death.  o Discussed with patient the implant type being used during surgery and patient understands and desires to proceed.  o Surgery pamphlet given.  o Follow Up post-op.  o Educated on risk of smoking related to procedure. Discussed options for smoking cessation.  o Educated on risk of elevated BMI related to procedure. Discussed options for weight loss/decreasing BMI prior to procedure including dietician consult, weight loss options and exercise program.  o Discussed treatment plans and diagnosis with the patient. Discussed operative vs non-operative measures. Patient has failed steroid injections and viscosupplementation. The left knee pain is throwing her hip off and she fears she may be unable to work anymore due to the pain. Patient wishes to proceed with a left total knee arthroplasty. 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   o The above service was scribed by Diana Smiley on my behalf and I attest to the accuracy of the note. [email or web address]  o Electronically Identified Patient Education Materials Provided Electronically            Electronically Signed by: Diana Smiley-, Other -Author on May 27, 2021 09:58:13 AM  Electronically Co-signed by: Don Acuna MD -Reviewer on May 27, 2021 11:05:57 PM

## 2021-06-06 ENCOUNTER — APPOINTMENT (OUTPATIENT)
Dept: CT IMAGING | Facility: HOSPITAL | Age: 46
End: 2021-06-06

## 2021-06-06 ENCOUNTER — HOSPITAL ENCOUNTER (EMERGENCY)
Facility: HOSPITAL | Age: 46
Discharge: HOME | End: 2021-06-07
Attending: NURSE PRACTITIONER | Admitting: EMERGENCY MEDICINE

## 2021-06-06 VITALS
HEIGHT: 59 IN | SYSTOLIC BLOOD PRESSURE: 113 MMHG | OXYGEN SATURATION: 98 % | BODY MASS INDEX: 45.29 KG/M2 | WEIGHT: 224.65 LBS | RESPIRATION RATE: 24 BRPM | HEART RATE: 76 BPM | TEMPERATURE: 98.1 F | DIASTOLIC BLOOD PRESSURE: 70 MMHG

## 2021-06-06 DIAGNOSIS — K30 INDIGESTION: Primary | ICD-10-CM

## 2021-06-06 LAB
ALBUMIN SERPL-MCNC: 4.3 G/DL (ref 3.5–5.2)
ALBUMIN/GLOB SERPL: 1.6 G/DL
ALP SERPL-CCNC: 81 U/L (ref 39–117)
ALT SERPL W P-5'-P-CCNC: 18 U/L (ref 1–33)
ANION GAP SERPL CALCULATED.3IONS-SCNC: 13.7 MMOL/L (ref 5–15)
AST SERPL-CCNC: 14 U/L (ref 1–32)
BACTERIA UR QL AUTO: ABNORMAL /HPF
BASOPHILS # BLD AUTO: 0.12 10*3/MM3 (ref 0–0.2)
BASOPHILS NFR BLD AUTO: 0.9 % (ref 0–1.5)
BILIRUB SERPL-MCNC: <0.2 MG/DL (ref 0–1.2)
BILIRUB UR QL STRIP: NEGATIVE
BUN SERPL-MCNC: 14 MG/DL (ref 6–20)
BUN/CREAT SERPL: 19.2 (ref 7–25)
CALCIUM SPEC-SCNC: 9.7 MG/DL (ref 8.6–10.5)
CHLORIDE SERPL-SCNC: 103 MMOL/L (ref 98–107)
CLARITY UR: CLEAR
CO2 SERPL-SCNC: 21.3 MMOL/L (ref 22–29)
COLOR UR: YELLOW
CREAT SERPL-MCNC: 0.73 MG/DL (ref 0.57–1)
DEPRECATED RDW RBC AUTO: 38.8 FL (ref 37–54)
EOSINOPHIL # BLD AUTO: 0.24 10*3/MM3 (ref 0–0.4)
EOSINOPHIL NFR BLD AUTO: 1.8 % (ref 0.3–6.2)
ERYTHROCYTE [DISTWIDTH] IN BLOOD BY AUTOMATED COUNT: 11.7 % (ref 12.3–15.4)
GFR SERPL CREATININE-BSD FRML MDRD: 87 ML/MIN/1.73
GLOBULIN UR ELPH-MCNC: 2.7 GM/DL
GLUCOSE SERPL-MCNC: 143 MG/DL (ref 65–99)
GLUCOSE UR STRIP-MCNC: NEGATIVE MG/DL
HCT VFR BLD AUTO: 38.8 % (ref 34–46.6)
HGB BLD-MCNC: 13.1 G/DL (ref 12–15.9)
HGB UR QL STRIP.AUTO: ABNORMAL
HOLD SPECIMEN: NORMAL
HOLD SPECIMEN: NORMAL
HYALINE CASTS UR QL AUTO: ABNORMAL /LPF
IMM GRANULOCYTES # BLD AUTO: 0.05 10*3/MM3 (ref 0–0.05)
IMM GRANULOCYTES NFR BLD AUTO: 0.4 % (ref 0–0.5)
KETONES UR QL STRIP: NEGATIVE
LEUKOCYTE ESTERASE UR QL STRIP.AUTO: NEGATIVE
LIPASE SERPL-CCNC: 23 U/L (ref 13–60)
LYMPHOCYTES # BLD AUTO: 2.9 10*3/MM3 (ref 0.7–3.1)
LYMPHOCYTES NFR BLD AUTO: 21.3 % (ref 19.6–45.3)
MCH RBC QN AUTO: 30.8 PG (ref 26.6–33)
MCHC RBC AUTO-ENTMCNC: 33.8 G/DL (ref 31.5–35.7)
MCV RBC AUTO: 91.3 FL (ref 79–97)
MONOCYTES # BLD AUTO: 0.61 10*3/MM3 (ref 0.1–0.9)
MONOCYTES NFR BLD AUTO: 4.5 % (ref 5–12)
NEUTROPHILS NFR BLD AUTO: 71.1 % (ref 42.7–76)
NEUTROPHILS NFR BLD AUTO: 9.69 10*3/MM3 (ref 1.7–7)
NITRITE UR QL STRIP: NEGATIVE
NRBC BLD AUTO-RTO: 0 /100 WBC (ref 0–0.2)
PH UR STRIP.AUTO: 5.5 [PH] (ref 5–8)
PLATELET # BLD AUTO: 487 10*3/MM3 (ref 140–450)
PMV BLD AUTO: 8.8 FL (ref 6–12)
POTASSIUM SERPL-SCNC: 4.4 MMOL/L (ref 3.5–5.2)
PROT SERPL-MCNC: 7 G/DL (ref 6–8.5)
PROT UR QL STRIP: NEGATIVE
RBC # BLD AUTO: 4.25 10*6/MM3 (ref 3.77–5.28)
RBC # UR: ABNORMAL /HPF
REF LAB TEST METHOD: ABNORMAL
SODIUM SERPL-SCNC: 138 MMOL/L (ref 136–145)
SP GR UR STRIP: 1.01 (ref 1–1.03)
SQUAMOUS #/AREA URNS HPF: ABNORMAL /HPF
UROBILINOGEN UR QL STRIP: ABNORMAL
WBC # BLD AUTO: 13.61 10*3/MM3 (ref 3.4–10.8)
WBC UR QL AUTO: ABNORMAL /HPF
WHOLE BLOOD HOLD SPECIMEN: NORMAL

## 2021-06-06 PROCEDURE — 25010000002 ONDANSETRON PER 1 MG: Performed by: NURSE PRACTITIONER

## 2021-06-06 PROCEDURE — 83690 ASSAY OF LIPASE: CPT | Performed by: NURSE PRACTITIONER

## 2021-06-06 PROCEDURE — 81001 URINALYSIS AUTO W/SCOPE: CPT | Performed by: NURSE PRACTITIONER

## 2021-06-06 PROCEDURE — 74177 CT ABD & PELVIS W/CONTRAST: CPT

## 2021-06-06 PROCEDURE — 80053 COMPREHEN METABOLIC PANEL: CPT | Performed by: NURSE PRACTITIONER

## 2021-06-06 PROCEDURE — 99284 EMERGENCY DEPT VISIT MOD MDM: CPT

## 2021-06-06 PROCEDURE — 96374 THER/PROPH/DIAG INJ IV PUSH: CPT

## 2021-06-06 PROCEDURE — 96375 TX/PRO/DX INJ NEW DRUG ADDON: CPT

## 2021-06-06 PROCEDURE — 0 IOPAMIDOL PER 1 ML: Performed by: EMERGENCY MEDICINE

## 2021-06-06 PROCEDURE — 85025 COMPLETE CBC W/AUTO DIFF WBC: CPT | Performed by: NURSE PRACTITIONER

## 2021-06-06 PROCEDURE — 25010000002 KETOROLAC TROMETHAMINE PER 15 MG: Performed by: NURSE PRACTITIONER

## 2021-06-06 RX ORDER — SODIUM CHLORIDE 0.9 % (FLUSH) 0.9 %
10 SYRINGE (ML) INJECTION AS NEEDED
Status: DISCONTINUED | OUTPATIENT
Start: 2021-06-06 | End: 2021-06-06

## 2021-06-06 RX ORDER — KETOROLAC TROMETHAMINE 30 MG/ML
30 INJECTION, SOLUTION INTRAMUSCULAR; INTRAVENOUS ONCE
Status: COMPLETED | OUTPATIENT
Start: 2021-06-06 | End: 2021-06-06

## 2021-06-06 RX ORDER — LIDOCAINE HYDROCHLORIDE 20 MG/ML
15 SOLUTION OROPHARYNGEAL ONCE
Status: COMPLETED | OUTPATIENT
Start: 2021-06-06 | End: 2021-06-06

## 2021-06-06 RX ORDER — LISINOPRIL 10 MG/1
10 TABLET ORAL DAILY
COMMUNITY

## 2021-06-06 RX ORDER — ONDANSETRON 2 MG/ML
4 INJECTION INTRAMUSCULAR; INTRAVENOUS ONCE
Status: COMPLETED | OUTPATIENT
Start: 2021-06-06 | End: 2021-06-06

## 2021-06-06 RX ORDER — ALUMINA, MAGNESIA, AND SIMETHICONE 2400; 2400; 240 MG/30ML; MG/30ML; MG/30ML
15 SUSPENSION ORAL ONCE
Status: COMPLETED | OUTPATIENT
Start: 2021-06-06 | End: 2021-06-06

## 2021-06-06 RX ADMIN — ALUMINUM HYDROXIDE, MAGNESIUM HYDROXIDE, AND DIMETHICONE 15 ML: 400; 400; 40 SUSPENSION ORAL at 21:30

## 2021-06-06 RX ADMIN — LIDOCAINE HYDROCHLORIDE 15 ML: 20 SOLUTION ORAL; TOPICAL at 21:31

## 2021-06-06 RX ADMIN — KETOROLAC TROMETHAMINE 30 MG: 30 INJECTION, SOLUTION INTRAMUSCULAR; INTRAVENOUS at 21:30

## 2021-06-06 RX ADMIN — IOPAMIDOL 100 ML: 755 INJECTION, SOLUTION INTRAVENOUS at 22:49

## 2021-06-06 RX ADMIN — SODIUM CHLORIDE, PRESERVATIVE FREE 10 ML: 5 INJECTION INTRAVENOUS at 21:30

## 2021-06-06 RX ADMIN — ONDANSETRON 4 MG: 2 INJECTION INTRAMUSCULAR; INTRAVENOUS at 21:30

## 2021-06-07 RX ORDER — SUCRALFATE ORAL 1 G/10ML
1 SUSPENSION ORAL
Qty: 420 ML | Refills: 0 | Status: SHIPPED | OUTPATIENT
Start: 2021-06-07 | End: 2022-05-30

## 2021-06-07 NOTE — ED PROVIDER NOTES
"Subjective   Patient reports that she has been having abdominal pain since this morning which has radiated through to the right side of her back.  Also reports that she had diarrhea yesterday and heartburn that started last night.  States that she has been \"eating and acid like candy\" which has not helped at all.  She does state that she has a history of gallstones and kidney stones and that this pain does not feel like her past kidney stones and she thinks it is related to her gallbladder.  She does report that she had one gallbladder attack before and this feels similar.      History provided by:  Patient      Review of Systems   Gastrointestinal: Positive for abdominal pain (Right side radiating around to the right mid back.) and diarrhea (All day yesterday.).        Indigestion/heartburn   All other systems reviewed and are negative.      Past Medical History:   Diagnosis Date   • Hypertension        Allergies   Allergen Reactions   • Morphine Anaphylaxis       Past Surgical History:   Procedure Laterality Date   • ABDOMINAL SURGERY     •  SECTION     • CLAVICLE SURGERY     • CYSTOSCOPY BLADDER STONE LITHOTRIPSY     • HYSTERECTOMY         History reviewed. No pertinent family history.    Social History     Socioeconomic History   • Marital status:      Spouse name: Not on file   • Number of children: Not on file   • Years of education: Not on file   • Highest education level: Not on file   Tobacco Use   • Smoking status: Heavy Tobacco Smoker     Packs/day: 0.50   Vaping Use   • Vaping Use: Never used   Substance and Sexual Activity   • Alcohol use: Never   • Drug use: Never   • Sexual activity: Yes           Objective   Physical Exam  Vitals and nursing note reviewed.   Constitutional:       General: She is not in acute distress.     Appearance: She is well-developed. She is obese. She is not ill-appearing, toxic-appearing or diaphoretic.   HENT:      Head: Normocephalic and atraumatic.   Eyes:    "   Pupils: Pupils are equal, round, and reactive to light.   Cardiovascular:      Rate and Rhythm: Normal rate and regular rhythm.      Heart sounds: Normal heart sounds.   Pulmonary:      Effort: Pulmonary effort is normal. No respiratory distress.      Breath sounds: Normal breath sounds.   Abdominal:      General: Abdomen is protuberant. Bowel sounds are normal. There is no distension or abdominal bruit.      Palpations: Abdomen is soft. There is no mass.      Tenderness: There is abdominal tenderness in the right upper quadrant. There is right CVA tenderness.   Skin:     General: Skin is warm and dry.      Capillary Refill: Capillary refill takes less than 2 seconds.      Findings: No erythema or rash.   Neurological:      General: No focal deficit present.      Mental Status: She is alert and oriented to person, place, and time.         Procedures           ED Course                                           MDM  Number of Diagnoses or Management Options  Indigestion: new and requires workup     Amount and/or Complexity of Data Reviewed  Clinical lab tests: ordered and reviewed  Tests in the radiology section of CPT®: ordered and reviewed  Tests in the medicine section of CPT®: ordered and reviewed    Patient Progress  Patient progress: improved      Final diagnoses:   Indigestion       ED Disposition  ED Disposition     ED Disposition Condition Comment    Discharge Stable           Amber Lundberg, APRN  [address] [place] [address]  [phone]    In 1 day  As needed         Medication List      New Prescriptions    sucralfate 1 GM/10ML suspension  Commonly known as: CARAFATE  Take 10 mL by mouth 4 (Four) Times a Day With Meals & at Bedtime.           Where to Get Your Medications      These medications were sent to [facility]  [address] - [phone]  - [phone]   [address] [place] [address]    Phone: [phone]    · sucralfate 1 GM/10ML suspension          Jo Ann Anne, ORALIA  06/07/21 0020       Jo Ann Anne, ORALIA  06/07/21 0137

## 2021-06-14 ENCOUNTER — APPOINTMENT (OUTPATIENT)
Dept: PREADMISSION TESTING | Facility: HOSPITAL | Age: 46
End: 2021-06-14

## 2021-06-22 ENCOUNTER — APPOINTMENT (OUTPATIENT)
Dept: LAB | Facility: HOSPITAL | Age: 46
End: 2021-06-22

## 2021-06-29 ENCOUNTER — PRE-ADMISSION TESTING (OUTPATIENT)
Dept: PREADMISSION TESTING | Facility: HOSPITAL | Age: 46
End: 2021-06-29

## 2021-06-29 VITALS
WEIGHT: 226.19 LBS | SYSTOLIC BLOOD PRESSURE: 122 MMHG | TEMPERATURE: 97.7 F | OXYGEN SATURATION: 99 % | HEIGHT: 60 IN | BODY MASS INDEX: 44.41 KG/M2 | HEART RATE: 84 BPM | DIASTOLIC BLOOD PRESSURE: 84 MMHG

## 2021-06-29 LAB
ALBUMIN SERPL-MCNC: 4.1 G/DL (ref 3.5–5.2)
ALBUMIN/GLOB SERPL: 1.6 G/DL
ALP SERPL-CCNC: 78 U/L (ref 39–117)
ALT SERPL W P-5'-P-CCNC: 21 U/L (ref 1–33)
ANION GAP SERPL CALCULATED.3IONS-SCNC: 12.4 MMOL/L (ref 5–15)
APTT PPP: 23.7 SECONDS (ref 22.2–34.2)
AST SERPL-CCNC: 18 U/L (ref 1–32)
BASOPHILS # BLD AUTO: 0.09 10*3/MM3 (ref 0–0.2)
BASOPHILS NFR BLD AUTO: 1.1 % (ref 0–1.5)
BILIRUB SERPL-MCNC: 0.2 MG/DL (ref 0–1.2)
BUN SERPL-MCNC: 10 MG/DL (ref 6–20)
BUN/CREAT SERPL: 15.6 (ref 7–25)
CALCIUM SPEC-SCNC: 8.9 MG/DL (ref 8.6–10.5)
CHLORIDE SERPL-SCNC: 106 MMOL/L (ref 98–107)
CO2 SERPL-SCNC: 19.6 MMOL/L (ref 22–29)
CREAT SERPL-MCNC: 0.64 MG/DL (ref 0.57–1)
DEPRECATED RDW RBC AUTO: 40.7 FL (ref 37–54)
EOSINOPHIL # BLD AUTO: 0.32 10*3/MM3 (ref 0–0.4)
EOSINOPHIL NFR BLD AUTO: 3.9 % (ref 0.3–6.2)
ERYTHROCYTE [DISTWIDTH] IN BLOOD BY AUTOMATED COUNT: 12.1 % (ref 12.3–15.4)
GFR SERPL CREATININE-BSD FRML MDRD: 101 ML/MIN/1.73
GLOBULIN UR ELPH-MCNC: 2.6 GM/DL
GLUCOSE SERPL-MCNC: 98 MG/DL (ref 65–99)
HBA1C MFR BLD: 5.34 % (ref 4.8–5.6)
HCT VFR BLD AUTO: 36.7 % (ref 34–46.6)
HGB BLD-MCNC: 12.4 G/DL (ref 12–15.9)
IMM GRANULOCYTES # BLD AUTO: 0.02 10*3/MM3 (ref 0–0.05)
IMM GRANULOCYTES NFR BLD AUTO: 0.2 % (ref 0–0.5)
INR PPP: 0.9 (ref 2–3)
LYMPHOCYTES # BLD AUTO: 2.97 10*3/MM3 (ref 0.7–3.1)
LYMPHOCYTES NFR BLD AUTO: 36.1 % (ref 19.6–45.3)
MCH RBC QN AUTO: 31 PG (ref 26.6–33)
MCHC RBC AUTO-ENTMCNC: 33.8 G/DL (ref 31.5–35.7)
MCV RBC AUTO: 91.8 FL (ref 79–97)
MONOCYTES # BLD AUTO: 0.56 10*3/MM3 (ref 0.1–0.9)
MONOCYTES NFR BLD AUTO: 6.8 % (ref 5–12)
NEUTROPHILS NFR BLD AUTO: 4.27 10*3/MM3 (ref 1.7–7)
NEUTROPHILS NFR BLD AUTO: 51.9 % (ref 42.7–76)
NRBC BLD AUTO-RTO: 0 /100 WBC (ref 0–0.2)
PLATELET # BLD AUTO: 452 10*3/MM3 (ref 140–450)
PMV BLD AUTO: 8.7 FL (ref 6–12)
POTASSIUM SERPL-SCNC: 3.8 MMOL/L (ref 3.5–5.2)
PROT SERPL-MCNC: 6.7 G/DL (ref 6–8.5)
PROTHROMBIN TIME: 10 SECONDS (ref 9.4–12)
RBC # BLD AUTO: 4 10*6/MM3 (ref 3.77–5.28)
SODIUM SERPL-SCNC: 138 MMOL/L (ref 136–145)
WBC # BLD AUTO: 8.23 10*3/MM3 (ref 3.4–10.8)

## 2021-06-29 PROCEDURE — 83036 HEMOGLOBIN GLYCOSYLATED A1C: CPT | Performed by: ORTHOPAEDIC SURGERY

## 2021-06-29 PROCEDURE — 93005 ELECTROCARDIOGRAM TRACING: CPT | Performed by: ORTHOPAEDIC SURGERY

## 2021-06-29 PROCEDURE — 85610 PROTHROMBIN TIME: CPT | Performed by: ORTHOPAEDIC SURGERY

## 2021-06-29 PROCEDURE — 93010 ELECTROCARDIOGRAM REPORT: CPT | Performed by: INTERNAL MEDICINE

## 2021-06-29 PROCEDURE — 85025 COMPLETE CBC W/AUTO DIFF WBC: CPT | Performed by: ORTHOPAEDIC SURGERY

## 2021-06-29 PROCEDURE — 85730 THROMBOPLASTIN TIME PARTIAL: CPT | Performed by: ORTHOPAEDIC SURGERY

## 2021-06-29 PROCEDURE — 80053 COMPREHEN METABOLIC PANEL: CPT | Performed by: ORTHOPAEDIC SURGERY

## 2021-06-29 RX ORDER — ERGOCALCIFEROL 1.25 MG/1
1 CAPSULE ORAL WEEKLY
COMMUNITY
Start: 2021-04-05 | End: 2022-05-30

## 2021-06-29 RX ORDER — NABUMETONE 750 MG/1
1 TABLET, FILM COATED ORAL 2 TIMES DAILY
COMMUNITY
Start: 2021-04-05 | End: 2021-07-15 | Stop reason: HOSPADM

## 2021-06-29 RX ORDER — HYDROCHLOROTHIAZIDE 12.5 MG/1
12.5 CAPSULE, GELATIN COATED ORAL DAILY
COMMUNITY
End: 2022-05-30

## 2021-06-29 RX ORDER — FLUTICASONE PROPIONATE 50 MCG
1 SPRAY, SUSPENSION (ML) NASAL DAILY
COMMUNITY
Start: 2021-04-05

## 2021-06-29 RX ORDER — CETIRIZINE HYDROCHLORIDE 10 MG/1
10 TABLET ORAL
COMMUNITY
Start: 2021-04-05

## 2021-06-29 ASSESSMENT — KOOS JR
KOOS JR SCORE: 39.625
KOOS JR SCORE: 19

## 2021-06-30 ENCOUNTER — ANESTHESIA EVENT (OUTPATIENT)
Dept: PERIOP | Facility: HOSPITAL | Age: 46
End: 2021-06-30

## 2021-06-30 LAB — QT INTERVAL: 394 MS

## 2021-07-08 ENCOUNTER — PREP FOR SURGERY (OUTPATIENT)
Dept: OTHER | Facility: HOSPITAL | Age: 46
End: 2021-07-08

## 2021-07-08 ENCOUNTER — LAB (OUTPATIENT)
Dept: LAB | Facility: HOSPITAL | Age: 46
End: 2021-07-08

## 2021-07-08 DIAGNOSIS — Z01.818 PRE-OP TESTING: Primary | ICD-10-CM

## 2021-07-08 PROBLEM — M17.12 OSTEOARTHRITIS OF LEFT KNEE: Status: ACTIVE | Noted: 2021-07-08

## 2021-07-08 LAB — SARS-COV-2 RNA RESP QL NAA+PROBE: NOT DETECTED

## 2021-07-08 PROCEDURE — U0003 INFECTIOUS AGENT DETECTION BY NUCLEIC ACID (DNA OR RNA); SEVERE ACUTE RESPIRATORY SYNDROME CORONAVIRUS 2 (SARS-COV-2) (CORONAVIRUS DISEASE [COVID-19]), AMPLIFIED PROBE TECHNIQUE, MAKING USE OF HIGH THROUGHPUT TECHNOLOGIES AS DESCRIBED BY CMS-2020-01-R: HCPCS

## 2021-07-08 PROCEDURE — C9803 HOPD COVID-19 SPEC COLLECT: HCPCS

## 2021-07-08 RX ORDER — CEFAZOLIN SODIUM 2 G/100ML
2 INJECTION, SOLUTION INTRAVENOUS ONCE
Status: CANCELLED | OUTPATIENT
Start: 2021-07-08 | End: 2021-07-08

## 2021-07-08 RX ORDER — SCOLOPAMINE TRANSDERMAL SYSTEM 1 MG/1
1 PATCH, EXTENDED RELEASE TRANSDERMAL CONTINUOUS
Status: CANCELLED | OUTPATIENT
Start: 2021-07-08 | End: 2021-07-11

## 2021-07-08 RX ORDER — TRANEXAMIC ACID 10 MG/ML
1000 INJECTION, SOLUTION INTRAVENOUS ONCE
Status: CANCELLED | OUTPATIENT
Start: 2021-07-08 | End: 2021-07-08

## 2021-07-08 RX ORDER — PREGABALIN 75 MG/1
75 CAPSULE ORAL ONCE
Status: CANCELLED | OUTPATIENT
Start: 2021-07-08 | End: 2021-07-08

## 2021-07-08 RX ORDER — CEFAZOLIN SODIUM IN 0.9 % NACL 3 G/100 ML
3 INTRAVENOUS SOLUTION, PIGGYBACK (ML) INTRAVENOUS ONCE
Status: CANCELLED | OUTPATIENT
Start: 2021-07-08 | End: 2021-07-08

## 2021-07-08 RX ORDER — ACETAMINOPHEN 500 MG
1000 TABLET ORAL ONCE
Status: CANCELLED | OUTPATIENT
Start: 2021-07-08 | End: 2021-07-08

## 2021-07-08 RX ORDER — OXYCODONE HCL 10 MG/1
10 TABLET, FILM COATED, EXTENDED RELEASE ORAL ONCE
Status: CANCELLED | OUTPATIENT
Start: 2021-07-08 | End: 2021-07-08

## 2021-07-13 ENCOUNTER — ANESTHESIA (OUTPATIENT)
Dept: PERIOP | Facility: HOSPITAL | Age: 46
End: 2021-07-13

## 2021-07-13 ENCOUNTER — HOSPITAL ENCOUNTER (OUTPATIENT)
Facility: HOSPITAL | Age: 46
Discharge: HOME | End: 2021-07-15
Attending: ORTHOPAEDIC SURGERY | Admitting: ORTHOPAEDIC SURGERY

## 2021-07-13 ENCOUNTER — APPOINTMENT (OUTPATIENT)
Dept: GENERAL RADIOLOGY | Facility: HOSPITAL | Age: 46
End: 2021-07-13

## 2021-07-13 DIAGNOSIS — Z78.9 DECREASED ACTIVITIES OF DAILY LIVING (ADL): ICD-10-CM

## 2021-07-13 DIAGNOSIS — R26.2 DIFFICULTY IN WALKING: Primary | ICD-10-CM

## 2021-07-13 DIAGNOSIS — M17.12 PRIMARY OSTEOARTHRITIS OF LEFT KNEE: ICD-10-CM

## 2021-07-13 DIAGNOSIS — Z01.818 PRE-OP TESTING: ICD-10-CM

## 2021-07-13 PROCEDURE — 25010000002 KETOROLAC TROMETHAMINE PER 15 MG: Performed by: NURSE ANESTHETIST, CERTIFIED REGISTERED

## 2021-07-13 PROCEDURE — 99220 PR INITIAL OBSERVATION CARE/DAY 70 MINUTES: CPT | Performed by: INTERNAL MEDICINE

## 2021-07-13 PROCEDURE — 73560 X-RAY EXAM OF KNEE 1 OR 2: CPT

## 2021-07-13 PROCEDURE — 25010000002 HYDROMORPHONE PER 4 MG: Performed by: ORTHOPAEDIC SURGERY

## 2021-07-13 PROCEDURE — 25010000002 DEXAMETHASONE PER 1 MG: Performed by: NURSE ANESTHETIST, CERTIFIED REGISTERED

## 2021-07-13 PROCEDURE — 25010000003 MEPERIDINE PER 100 MG: Performed by: NURSE ANESTHETIST, CERTIFIED REGISTERED

## 2021-07-13 PROCEDURE — C1713 ANCHOR/SCREW BN/BN,TIS/BN: HCPCS | Performed by: ORTHOPAEDIC SURGERY

## 2021-07-13 PROCEDURE — 25010000002 PROPOFOL 10 MG/ML EMULSION: Performed by: NURSE ANESTHETIST, CERTIFIED REGISTERED

## 2021-07-13 PROCEDURE — 25010000002 EPINEPHRINE 1 MG/ML SOLUTION: Performed by: ORTHOPAEDIC SURGERY

## 2021-07-13 PROCEDURE — 25010000002 ROPIVACAINE PER 1 MG: Performed by: ORTHOPAEDIC SURGERY

## 2021-07-13 PROCEDURE — 97161 PT EVAL LOW COMPLEX 20 MIN: CPT

## 2021-07-13 PROCEDURE — 27447 TOTAL KNEE ARTHROPLASTY: CPT | Performed by: ORTHOPAEDIC SURGERY

## 2021-07-13 PROCEDURE — 25010000002 FENTANYL CITRATE (PF) 50 MCG/ML SOLUTION: Performed by: NURSE ANESTHETIST, CERTIFIED REGISTERED

## 2021-07-13 PROCEDURE — 76942 ECHO GUIDE FOR BIOPSY: CPT | Performed by: ORTHOPAEDIC SURGERY

## 2021-07-13 PROCEDURE — 25010000002 MIDAZOLAM PER 1MG: Performed by: ANESTHESIOLOGY

## 2021-07-13 PROCEDURE — 25010000002 HYDROMORPHONE 1 MG/ML SOLUTION: Performed by: ANESTHESIOLOGY

## 2021-07-13 PROCEDURE — C1776 JOINT DEVICE (IMPLANTABLE): HCPCS | Performed by: ORTHOPAEDIC SURGERY

## 2021-07-13 PROCEDURE — 20985 CPTR-ASST DIR MS PX: CPT | Performed by: ORTHOPAEDIC SURGERY

## 2021-07-13 PROCEDURE — 25010000002 KETOROLAC TROMETHAMINE PER 15 MG: Performed by: ORTHOPAEDIC SURGERY

## 2021-07-13 PROCEDURE — 25010000003 CEFAZOLIN IN DEXTROSE 2-4 GM/100ML-% SOLUTION: Performed by: ORTHOPAEDIC SURGERY

## 2021-07-13 PROCEDURE — 25010000002 ONDANSETRON PER 1 MG: Performed by: NURSE ANESTHETIST, CERTIFIED REGISTERED

## 2021-07-13 DEVICE — CAP TOTL KN CMT PRIMARY: Type: IMPLANTABLE DEVICE | Site: KNEE | Status: FUNCTIONAL

## 2021-07-13 DEVICE — IMPLANTABLE DEVICE: Type: IMPLANTABLE DEVICE | Site: KNEE | Status: FUNCTIONAL

## 2021-07-13 DEVICE — STEM TIB/KN PERSONA CMT 5D SZC LT: Type: IMPLANTABLE DEVICE | Site: KNEE | Status: FUNCTIONAL

## 2021-07-13 DEVICE — ART/SRF KN PERSONA/VE PS CD/CR 4TO5 11MM LT: Type: IMPLANTABLE DEVICE | Site: KNEE | Status: FUNCTIONAL

## 2021-07-13 DEVICE — PAT KN PERSONA ALLPOLY CMT 7.5X26MM: Type: IMPLANTABLE DEVICE | Site: KNEE | Status: FUNCTIONAL

## 2021-07-13 DEVICE — CMT BONE PALACOS R HI/VISC 1X40: Type: IMPLANTABLE DEVICE | Site: KNEE | Status: FUNCTIONAL

## 2021-07-13 RX ORDER — GLYCOPYRROLATE 0.2 MG/ML
0.2 INJECTION INTRAMUSCULAR; INTRAVENOUS
Status: COMPLETED | OUTPATIENT
Start: 2021-07-13 | End: 2021-07-13

## 2021-07-13 RX ORDER — DOCUSATE SODIUM 100 MG/1
100 CAPSULE, LIQUID FILLED ORAL 2 TIMES DAILY PRN
Status: DISCONTINUED | OUTPATIENT
Start: 2021-07-13 | End: 2021-07-15 | Stop reason: HOSPADM

## 2021-07-13 RX ORDER — SODIUM CHLORIDE 0.9 % (FLUSH) 0.9 %
10 SYRINGE (ML) INJECTION AS NEEDED
Status: DISCONTINUED | OUTPATIENT
Start: 2021-07-13 | End: 2021-07-15 | Stop reason: HOSPADM

## 2021-07-13 RX ORDER — HYDROCODONE BITARTRATE AND ACETAMINOPHEN 7.5; 325 MG/1; MG/1
2 TABLET ORAL EVERY 4 HOURS PRN
Status: DISCONTINUED | OUTPATIENT
Start: 2021-07-13 | End: 2021-07-15 | Stop reason: HOSPADM

## 2021-07-13 RX ORDER — ACETAMINOPHEN 500 MG
1000 TABLET ORAL ONCE
Status: COMPLETED | OUTPATIENT
Start: 2021-07-13 | End: 2021-07-13

## 2021-07-13 RX ORDER — FENTANYL CITRATE 50 UG/ML
INJECTION, SOLUTION INTRAMUSCULAR; INTRAVENOUS AS NEEDED
Status: DISCONTINUED | OUTPATIENT
Start: 2021-07-13 | End: 2021-07-13 | Stop reason: SURG

## 2021-07-13 RX ORDER — SODIUM CHLORIDE, SODIUM LACTATE, POTASSIUM CHLORIDE, CALCIUM CHLORIDE 600; 310; 30; 20 MG/100ML; MG/100ML; MG/100ML; MG/100ML
80 INJECTION, SOLUTION INTRAVENOUS CONTINUOUS
Status: DISCONTINUED | OUTPATIENT
Start: 2021-07-13 | End: 2021-07-15 | Stop reason: HOSPADM

## 2021-07-13 RX ORDER — KETOROLAC TROMETHAMINE 30 MG/ML
INJECTION, SOLUTION INTRAMUSCULAR; INTRAVENOUS AS NEEDED
Status: DISCONTINUED | OUTPATIENT
Start: 2021-07-13 | End: 2021-07-13 | Stop reason: SURG

## 2021-07-13 RX ORDER — CELECOXIB 100 MG/1
200 CAPSULE ORAL ONCE
Status: COMPLETED | OUTPATIENT
Start: 2021-07-13 | End: 2021-07-13

## 2021-07-13 RX ORDER — PREGABALIN 75 MG/1
75 CAPSULE ORAL ONCE
Status: DISCONTINUED | OUTPATIENT
Start: 2021-07-13 | End: 2021-07-13 | Stop reason: SDUPTHER

## 2021-07-13 RX ORDER — SODIUM CHLORIDE, SODIUM LACTATE, POTASSIUM CHLORIDE, CALCIUM CHLORIDE 600; 310; 30; 20 MG/100ML; MG/100ML; MG/100ML; MG/100ML
9 INJECTION, SOLUTION INTRAVENOUS CONTINUOUS PRN
Status: DISCONTINUED | OUTPATIENT
Start: 2021-07-13 | End: 2021-07-13 | Stop reason: HOSPADM

## 2021-07-13 RX ORDER — HYDROCODONE BITARTRATE AND ACETAMINOPHEN 7.5; 325 MG/1; MG/1
1 TABLET ORAL EVERY 4 HOURS PRN
Status: DISCONTINUED | OUTPATIENT
Start: 2021-07-13 | End: 2021-07-15 | Stop reason: HOSPADM

## 2021-07-13 RX ORDER — ONDANSETRON 2 MG/ML
4 INJECTION INTRAMUSCULAR; INTRAVENOUS ONCE AS NEEDED
Status: DISCONTINUED | OUTPATIENT
Start: 2021-07-13 | End: 2021-07-13 | Stop reason: HOSPADM

## 2021-07-13 RX ORDER — ONDANSETRON 2 MG/ML
INJECTION INTRAMUSCULAR; INTRAVENOUS AS NEEDED
Status: DISCONTINUED | OUTPATIENT
Start: 2021-07-13 | End: 2021-07-13 | Stop reason: SURG

## 2021-07-13 RX ORDER — PROPOFOL 10 MG/ML
VIAL (ML) INTRAVENOUS AS NEEDED
Status: DISCONTINUED | OUTPATIENT
Start: 2021-07-13 | End: 2021-07-13 | Stop reason: SURG

## 2021-07-13 RX ORDER — LABETALOL HYDROCHLORIDE 5 MG/ML
INJECTION, SOLUTION INTRAVENOUS AS NEEDED
Status: DISCONTINUED | OUTPATIENT
Start: 2021-07-13 | End: 2021-07-13 | Stop reason: SURG

## 2021-07-13 RX ORDER — MEPERIDINE HYDROCHLORIDE 25 MG/ML
12.5 INJECTION INTRAMUSCULAR; INTRAVENOUS; SUBCUTANEOUS
Status: DISCONTINUED | OUTPATIENT
Start: 2021-07-13 | End: 2021-07-13 | Stop reason: HOSPADM

## 2021-07-13 RX ORDER — BUPIVACAINE HYDROCHLORIDE AND EPINEPHRINE 5; 5 MG/ML; UG/ML
INJECTION, SOLUTION EPIDURAL; INTRACAUDAL; PERINEURAL
Status: COMPLETED | OUTPATIENT
Start: 2021-07-13 | End: 2021-07-13

## 2021-07-13 RX ORDER — ASPIRIN 325 MG
325 TABLET, DELAYED RELEASE (ENTERIC COATED) ORAL DAILY
Qty: 21 TABLET | Refills: 0 | Status: SHIPPED | OUTPATIENT
Start: 2021-07-13

## 2021-07-13 RX ORDER — SODIUM CHLORIDE, SODIUM LACTATE, POTASSIUM CHLORIDE, CALCIUM CHLORIDE 600; 310; 30; 20 MG/100ML; MG/100ML; MG/100ML; MG/100ML
INJECTION, SOLUTION INTRAVENOUS CONTINUOUS PRN
Status: DISCONTINUED | OUTPATIENT
Start: 2021-07-13 | End: 2021-07-13 | Stop reason: SURG

## 2021-07-13 RX ORDER — MAGNESIUM HYDROXIDE 1200 MG/15ML
LIQUID ORAL AS NEEDED
Status: DISCONTINUED | OUTPATIENT
Start: 2021-07-13 | End: 2021-07-13 | Stop reason: HOSPADM

## 2021-07-13 RX ORDER — ONDANSETRON 2 MG/ML
4 INJECTION INTRAMUSCULAR; INTRAVENOUS EVERY 6 HOURS PRN
Status: DISCONTINUED | OUTPATIENT
Start: 2021-07-13 | End: 2021-07-15 | Stop reason: HOSPADM

## 2021-07-13 RX ORDER — DEXAMETHASONE SODIUM PHOSPHATE 4 MG/ML
INJECTION, SOLUTION INTRA-ARTICULAR; INTRALESIONAL; INTRAMUSCULAR; INTRAVENOUS; SOFT TISSUE AS NEEDED
Status: DISCONTINUED | OUTPATIENT
Start: 2021-07-13 | End: 2021-07-13 | Stop reason: SURG

## 2021-07-13 RX ORDER — TRANEXAMIC ACID 10 MG/ML
1000 INJECTION, SOLUTION INTRAVENOUS ONCE
Status: DISCONTINUED | OUTPATIENT
Start: 2021-07-13 | End: 2021-07-13 | Stop reason: HOSPADM

## 2021-07-13 RX ORDER — MIDAZOLAM HYDROCHLORIDE 1 MG/ML
3 INJECTION INTRAMUSCULAR; INTRAVENOUS ONCE
Status: COMPLETED | OUTPATIENT
Start: 2021-07-13 | End: 2021-07-13

## 2021-07-13 RX ORDER — KETOROLAC TROMETHAMINE 15 MG/ML
15 INJECTION, SOLUTION INTRAMUSCULAR; INTRAVENOUS EVERY 6 HOURS PRN
Status: DISCONTINUED | OUTPATIENT
Start: 2021-07-13 | End: 2021-07-13 | Stop reason: SDUPTHER

## 2021-07-13 RX ORDER — SODIUM CHLORIDE 0.9 % (FLUSH) 0.9 %
10 SYRINGE (ML) INJECTION AS NEEDED
Status: DISCONTINUED | OUTPATIENT
Start: 2021-07-13 | End: 2021-07-13 | Stop reason: HOSPADM

## 2021-07-13 RX ORDER — GABAPENTIN 300 MG/1
600 CAPSULE ORAL ONCE
Status: COMPLETED | OUTPATIENT
Start: 2021-07-13 | End: 2021-07-13

## 2021-07-13 RX ORDER — SODIUM CHLORIDE 0.9 % (FLUSH) 0.9 %
3 SYRINGE (ML) INJECTION EVERY 12 HOURS SCHEDULED
Status: DISCONTINUED | OUTPATIENT
Start: 2021-07-13 | End: 2021-07-15 | Stop reason: HOSPADM

## 2021-07-13 RX ORDER — PROMETHAZINE HYDROCHLORIDE 12.5 MG/1
25 TABLET ORAL ONCE AS NEEDED
Status: DISCONTINUED | OUTPATIENT
Start: 2021-07-13 | End: 2021-07-13 | Stop reason: HOSPADM

## 2021-07-13 RX ORDER — DEXMEDETOMIDINE HYDROCHLORIDE 100 UG/ML
INJECTION, SOLUTION INTRAVENOUS AS NEEDED
Status: DISCONTINUED | OUTPATIENT
Start: 2021-07-13 | End: 2021-07-13 | Stop reason: SURG

## 2021-07-13 RX ORDER — BISACODYL 5 MG/1
10 TABLET, DELAYED RELEASE ORAL DAILY PRN
Status: DISCONTINUED | OUTPATIENT
Start: 2021-07-13 | End: 2021-07-15 | Stop reason: HOSPADM

## 2021-07-13 RX ORDER — SUCCINYLCHOLINE/SOD CL,ISO/PF 100 MG/5ML
SYRINGE (ML) INTRAVENOUS AS NEEDED
Status: DISCONTINUED | OUTPATIENT
Start: 2021-07-13 | End: 2021-07-13 | Stop reason: SURG

## 2021-07-13 RX ORDER — SCOLOPAMINE TRANSDERMAL SYSTEM 1 MG/1
1 PATCH, EXTENDED RELEASE TRANSDERMAL CONTINUOUS
Status: DISCONTINUED | OUTPATIENT
Start: 2021-07-13 | End: 2021-07-15 | Stop reason: HOSPADM

## 2021-07-13 RX ORDER — ACETAMINOPHEN 500 MG
1000 TABLET ORAL ONCE
Status: DISCONTINUED | OUTPATIENT
Start: 2021-07-13 | End: 2021-07-13 | Stop reason: SDUPTHER

## 2021-07-13 RX ORDER — CEFAZOLIN SODIUM 2 G/100ML
2 INJECTION, SOLUTION INTRAVENOUS ONCE
Status: COMPLETED | OUTPATIENT
Start: 2021-07-13 | End: 2021-07-13

## 2021-07-13 RX ORDER — OXYCODONE HCL 10 MG/1
10 TABLET, FILM COATED, EXTENDED RELEASE ORAL ONCE
Status: DISCONTINUED | OUTPATIENT
Start: 2021-07-13 | End: 2021-07-13

## 2021-07-13 RX ORDER — BISACODYL 10 MG
10 SUPPOSITORY, RECTAL RECTAL DAILY PRN
Status: DISCONTINUED | OUTPATIENT
Start: 2021-07-13 | End: 2021-07-15 | Stop reason: HOSPADM

## 2021-07-13 RX ORDER — PROMETHAZINE HYDROCHLORIDE 25 MG/1
25 SUPPOSITORY RECTAL ONCE AS NEEDED
Status: DISCONTINUED | OUTPATIENT
Start: 2021-07-13 | End: 2021-07-13 | Stop reason: HOSPADM

## 2021-07-13 RX ORDER — HYDROCODONE BITARTRATE AND ACETAMINOPHEN 7.5; 325 MG/1; MG/1
1 TABLET ORAL EVERY 4 HOURS PRN
Qty: 45 TABLET | Refills: 0 | Status: SHIPPED | OUTPATIENT
Start: 2021-07-13 | End: 2021-07-19 | Stop reason: SDUPTHER

## 2021-07-13 RX ORDER — CEFAZOLIN SODIUM IN 0.9 % NACL 3 G/100 ML
3 INTRAVENOUS SOLUTION, PIGGYBACK (ML) INTRAVENOUS ONCE
Status: DISCONTINUED | OUTPATIENT
Start: 2021-07-13 | End: 2021-07-13 | Stop reason: DRUGHIGH

## 2021-07-13 RX ORDER — LIDOCAINE HYDROCHLORIDE 20 MG/ML
INJECTION, SOLUTION INFILTRATION; PERINEURAL AS NEEDED
Status: DISCONTINUED | OUTPATIENT
Start: 2021-07-13 | End: 2021-07-13 | Stop reason: SURG

## 2021-07-13 RX ORDER — CEFAZOLIN SODIUM 2 G/100ML
2 INJECTION, SOLUTION INTRAVENOUS EVERY 8 HOURS
Status: COMPLETED | OUTPATIENT
Start: 2021-07-13 | End: 2021-07-14

## 2021-07-13 RX ORDER — KETOROLAC TROMETHAMINE 15 MG/ML
15 INJECTION, SOLUTION INTRAMUSCULAR; INTRAVENOUS EVERY 6 HOURS PRN
Status: COMPLETED | OUTPATIENT
Start: 2021-07-13 | End: 2021-07-15

## 2021-07-13 RX ORDER — ONDANSETRON 4 MG/1
4 TABLET, FILM COATED ORAL EVERY 6 HOURS PRN
Status: DISCONTINUED | OUTPATIENT
Start: 2021-07-13 | End: 2021-07-15 | Stop reason: HOSPADM

## 2021-07-13 RX ORDER — ROCURONIUM BROMIDE 10 MG/ML
INJECTION, SOLUTION INTRAVENOUS AS NEEDED
Status: DISCONTINUED | OUTPATIENT
Start: 2021-07-13 | End: 2021-07-13 | Stop reason: SURG

## 2021-07-13 RX ORDER — TRANEXAMIC ACID 10 MG/ML
1000 INJECTION, SOLUTION INTRAVENOUS ONCE
Status: COMPLETED | OUTPATIENT
Start: 2021-07-13 | End: 2021-07-13

## 2021-07-13 RX ADMIN — SCOPALAMINE 1 PATCH: 1 PATCH, EXTENDED RELEASE TRANSDERMAL at 09:45

## 2021-07-13 RX ADMIN — HYDROMORPHONE HYDROCHLORIDE 0.5 MG: 1 INJECTION, SOLUTION INTRAMUSCULAR; INTRAVENOUS; SUBCUTANEOUS at 13:21

## 2021-07-13 RX ADMIN — DEXMEDETOMIDINE HYDROCHLORIDE 20 MCG: 100 INJECTION, SOLUTION INTRAVENOUS at 12:11

## 2021-07-13 RX ADMIN — GABAPENTIN 600 MG: 300 CAPSULE ORAL at 09:46

## 2021-07-13 RX ADMIN — FENTANYL CITRATE 50 MCG: 50 INJECTION INTRAMUSCULAR; INTRAVENOUS at 11:24

## 2021-07-13 RX ADMIN — SODIUM CHLORIDE, POTASSIUM CHLORIDE, SODIUM LACTATE AND CALCIUM CHLORIDE 9 ML/HR: 600; 310; 30; 20 INJECTION, SOLUTION INTRAVENOUS at 09:44

## 2021-07-13 RX ADMIN — LABETALOL 20 MG/4 ML (5 MG/ML) INTRAVENOUS SYRINGE 5 MG: at 11:30

## 2021-07-13 RX ADMIN — HYDROCODONE BITARTRATE AND ACETAMINOPHEN 2 TABLET: 7.5; 325 TABLET ORAL at 22:43

## 2021-07-13 RX ADMIN — ACETAMINOPHEN 1000 MG: 500 TABLET ORAL at 09:46

## 2021-07-13 RX ADMIN — TRANEXAMIC ACID 1000 MG: 100 INJECTION, SOLUTION INTRAVENOUS at 12:01

## 2021-07-13 RX ADMIN — ROCURONIUM BROMIDE 40 MG: 10 INJECTION INTRAVENOUS at 11:09

## 2021-07-13 RX ADMIN — CEFAZOLIN SODIUM 2 G: 2 INJECTION, SOLUTION INTRAVENOUS at 18:24

## 2021-07-13 RX ADMIN — PROPOFOL 200 MG: 10 INJECTION, EMULSION INTRAVENOUS at 11:03

## 2021-07-13 RX ADMIN — Medication 100 MG: at 11:04

## 2021-07-13 RX ADMIN — DEXAMETHASONE SODIUM PHOSPHATE 8 MG: 4 INJECTION INTRA-ARTICULAR; INTRALESIONAL; INTRAMUSCULAR; INTRAVENOUS; SOFT TISSUE at 11:12

## 2021-07-13 RX ADMIN — HYDROCODONE BITARTRATE AND ACETAMINOPHEN 2 TABLET: 7.5; 325 TABLET ORAL at 18:47

## 2021-07-13 RX ADMIN — HYDROCODONE BITARTRATE AND ACETAMINOPHEN 2 TABLET: 7.5; 325 TABLET ORAL at 14:53

## 2021-07-13 RX ADMIN — FENTANYL CITRATE 50 MCG: 50 INJECTION INTRAMUSCULAR; INTRAVENOUS at 11:21

## 2021-07-13 RX ADMIN — SODIUM CHLORIDE, POTASSIUM CHLORIDE, SODIUM LACTATE AND CALCIUM CHLORIDE: 600; 310; 30; 20 INJECTION, SOLUTION INTRAVENOUS at 10:57

## 2021-07-13 RX ADMIN — FENTANYL CITRATE 100 MCG: 50 INJECTION INTRAMUSCULAR; INTRAVENOUS at 11:01

## 2021-07-13 RX ADMIN — MIDAZOLAM HYDROCHLORIDE 3 MG: 1 INJECTION, SOLUTION INTRAMUSCULAR; INTRAVENOUS at 09:52

## 2021-07-13 RX ADMIN — GLYCOPYRROLATE 0.2 MG: 0.2 INJECTION INTRAMUSCULAR; INTRAVENOUS at 09:53

## 2021-07-13 RX ADMIN — KETOROLAC TROMETHAMINE 30 MG: 30 INJECTION, SOLUTION INTRAMUSCULAR; INTRAVENOUS at 11:13

## 2021-07-13 RX ADMIN — ONDANSETRON 4 MG: 2 INJECTION INTRAMUSCULAR; INTRAVENOUS at 11:13

## 2021-07-13 RX ADMIN — KETOROLAC TROMETHAMINE 15 MG: 15 INJECTION, SOLUTION INTRAMUSCULAR; INTRAVENOUS at 21:02

## 2021-07-13 RX ADMIN — SUGAMMADEX 100 MG: 100 INJECTION, SOLUTION INTRAVENOUS at 12:16

## 2021-07-13 RX ADMIN — ROCURONIUM BROMIDE 10 MG: 10 INJECTION INTRAVENOUS at 11:01

## 2021-07-13 RX ADMIN — LIDOCAINE HYDROCHLORIDE 100 MG: 20 INJECTION, SOLUTION INFILTRATION; PERINEURAL at 11:01

## 2021-07-13 RX ADMIN — MEPERIDINE HYDROCHLORIDE 12.5 MG: 25 INJECTION INTRAMUSCULAR; INTRAVENOUS; SUBCUTANEOUS at 13:12

## 2021-07-13 RX ADMIN — ROCURONIUM BROMIDE 10 MG: 10 INJECTION INTRAVENOUS at 11:45

## 2021-07-13 RX ADMIN — TRANEXAMIC ACID 1000 MG: 10 INJECTION, SOLUTION INTRAVENOUS at 09:53

## 2021-07-13 RX ADMIN — BUPIVACAINE HYDROCHLORIDE AND EPINEPHRINE BITARTRATE 30 ML: 5; .005 INJECTION, SOLUTION EPIDURAL; INTRACAUDAL; PERINEURAL at 10:15

## 2021-07-13 RX ADMIN — CEFAZOLIN SODIUM 2 G: 2 INJECTION, SOLUTION INTRAVENOUS at 11:07

## 2021-07-13 RX ADMIN — SODIUM CHLORIDE, POTASSIUM CHLORIDE, SODIUM LACTATE AND CALCIUM CHLORIDE 80 ML/HR: 600; 310; 30; 20 INJECTION, SOLUTION INTRAVENOUS at 14:25

## 2021-07-13 RX ADMIN — CELECOXIB 200 MG: 100 CAPSULE ORAL at 09:46

## 2021-07-13 NOTE — THERAPY EVALUATION
Acute Care - Physical Therapy Initial Evaluation   [facility]     Patient Name: Beth Mullins  : 1975  MRN: [id number]  Today's Date: 2021   Onset of Illness/Injury or Date of Surgery: 21  Admit date: 2021     Referring Physician: Don Umaña MD     Visit diagnosis    ICD-10-CM ICD-9-CM   1. Difficulty in walking  R26.2 719.7   2. Pre-op testing  Z01.818 V72.84        Active Hospital Problems    Diagnosis  POA   • Osteoarthritis of left knee [M17.12]  Yes        SurgeryDate:2021   Procedure(s) (LRB):  LEFT TOTAL KNEE ARTHROPLASTY WITH COMPUTER NAVIGATION (Left)     Past Medical History:   Diagnosis Date   • Anemia    • Arthritis    • Febrile seizure (CMS/HCC)     when 3 yr old   • Gastric ulcer    • Hypertension    • Seasonal allergies         Past Surgical History:   Procedure Laterality Date   • ABDOMINAL SURGERY     •  SECTION     • CLAVICLE SURGERY     • CYSTOSCOPY BLADDER STONE LITHOTRIPSY     • HYSTERECTOMY             PT Assessment (last 12 hours)      PT Evaluation and Treatment     Row Name 21 1420          Physical Therapy Time and Intention    Subjective Information  complains of;pain  -DAVID     Document Type  evaluation  -DAVID     Mode of Treatment  individual therapy;physical therapy  -DAVID     Patient Effort  good  -DAVID     Symptoms Noted During/After Treatment  other (see comments) pain  -DAVID     Row Name 21 1420          General Information    Patient Profile Reviewed  yes  -DAVID     Onset of Illness/Injury or Date of Surgery  21  -DAVID     Referring Physician  Don Umaña  -DAVID     Patient Observations  alert;cooperative;agree to therapy  -DAVID     Prior Level of Function  independent:;all household mobility;community mobility;gait;transfer  -DAVID     Equipment Currently Used at Home  none  -DAVID     Risks Reviewed  patient:  -DAVID     Benefits Reviewed  patient:  -DAVID     Row Name 21 7752          Living Environment    Current Living Arrangements   home/apartment/condo  -DAVID     Home Accessibility  stairs within home  -DAVID     Lives With  spouse  -DAVID     Row Name 07/13/21 1432          Stairs Within Home, Primary    Number of Stairs, Within Home, Primary  ten;other (see comments) can avoid the stairs  -DAVID     Row Name 07/13/21 1432          Range of Motion (ROM)    Range of Motion  left lower extremity ROM  -DAVID     Left Lower Extremity (ROM)  knee  -DAVID     Knee, Left (Range of Motion)  80  -DAVID     Row Name 07/13/21 1432          Strength (Manual Muscle Testing)    Strength (Manual Muscle Testing)  left lower extremity;other (see comments) 4-/5  -DAVID     Row Name 07/13/21 1432          Bed Mobility    Bed Mobility  bed mobility (all) activities  -DAVID     All Activities, [place] (Bed Mobility)  minimum assist (75% patient effort)  -DAVID     Assistive Device (Bed Mobility)  bed rails  -DAVID     Row Name 07/13/21 1432          Transfers    Transfers  sit-stand transfer  -DAVID     Sit-Stand [place] (Transfers)  minimum assist (75% patient effort)  -DAVID     Row Name 07/13/21 1432          Sit-Stand Transfer    Assistive Device (Sit-Stand Transfers)  walker, front-wheeled  -DAVID     Row Name 07/13/21 1432          Gait/Stairs (Locomotion)    Gait/Stairs Locomotion  gait/ambulation independence  -DAVID     [place] Level (Gait)  minimum assist (75% patient effort)  -DAVID     Assistive Device (Gait)  walker, front-wheeled  -DAVID     Distance in Feet (Gait)  12  -DAVID     Pattern (Gait)  step-to  -DAVID     Row Name 07/13/21 1432          Balance    Balance Assessment  standing dynamic balance  -DAVID     Dynamic Standing Balance  mild impairment  -DAVID     Row Name             Wound 07/13/21 1146 Left anterior knee Incision    Wound - Properties Group Placement Date: 07/13/21  -SC Placement Time: 1146  -SC Side: Left  -SC Orientation: anterior  -SC Location: knee  -SC Primary Wound Type: Incision  -SC    Retired Wound - Properties Group Date first assessed: 07/13/21  -SC Time first  assessed: 1146  -SC Side: Left  -SC Location: knee  -SC Primary Wound Type: Incision  -SC    Row Name 07/13/21 1432          Plan of Care Review    Plan of Care Reviewed With  patient;family  -DAVID     Outcome Summary  Patient is a 45 year old female who presents with strength and mobility deficits tht impair her ability to safely ambulate and perform all transfers. She will benefit from skilled PT.  -DAVID     Row Name 07/13/21 1432          Physical Therapy Goals    Bed Mobility Goal Selection (PT)  bed mobility, PT goal 1  -DAVID     Transfer Goal Selection (PT)  transfer, PT goal 1  -DAVID     Gait Training Goal Selection (PT)  gait training, PT goal 1  -DAVID     Row Name 07/13/21 1432          Bed Mobility Goal 1 (PT)    Activity/Assistive Device (Bed Mobility Goal 1, PT)  bed mobility activities, all  -DAVID     [place] Level/Cues Needed (Bed Mobility Goal 1, PT)  independent  -DAVID     Time Frame (Bed Mobility Goal 1, PT)  long term goal (LTG);10 days  -DAVID     Row Name 07/13/21 1432          Transfer Goal 1 (PT)    Activity/Assistive Device (Transfer Goal 1, PT)  transfers, all  -DAVID     [place] Level/Cues Needed (Transfer Goal 1, PT)  independent  -DAVID     Time Frame (Transfer Goal 1, PT)  long term goal (LTG);10 days  -DAVID     Row Name 07/13/21 1432          Gait Training Goal 1 (PT)    Activity/Assistive Device (Gait Training Goal 1, PT)  gait (walking locomotion);walker, rolling  -DAVID     [place] Level (Gait Training Goal 1, PT)  independent  -DAVID     Distance (Gait Training Goal 1, PT)  150  -DAVID     Time Frame (Gait Training Goal 1, PT)  long term goal (LTG);10 days  -DAVID     Row Name 07/13/21 1432          Therapy Assessment/Plan (PT)    Patient/Family Therapy Goals Statement (PT)  To be able to do yard work again.  -DAVID     Rehab Potential (PT)  good, to achieve stated therapy goals  -DAVID     Criteria for Skilled Interventions Met (PT)  yes  -DAVID     Predicted Duration of Therapy Intervention (PT)  10 days  -DAVID      Problem List (PT)  balance;strength;mobility  -DAVID     Row Name 07/13/21 1432          PT Evaluation Complexity    History, PT Evaluation Complexity  no personal factors and/or comorbidities  -DAVID     Examination of Body Systems (PT Eval Complexity)  total of 4 or more elements  -DAVID     Clinical Presentation (PT Evaluation Complexity)  stable  -DAVID     Clinical Decision Making (PT Evaluation Complexity)  low complexity  -DAVID     Overall Complexity (PT Evaluation Complexity)  low complexity  -DAVID     Row Name 07/13/21 1432          Therapy Plan Review/Discharge Plan (PT)    Therapy Plan Review (PT)  evaluation/treatment results reviewed  -DAVID       User Key  (r) = Recorded By, (t) = Taken By, (c) = Cosigned By    Initials Name Provider Type    Kayla Paz, RN Registered Nurse    Lester Cortez PT Physical Therapist        Physical Therapy Education                 Title: PT OT SLP Therapies (Done)     Topic: Physical Therapy (Done)     Point: Mobility training (Done)     Learning Progress Summary           Patient Acceptance, E,TB, VU by DVAID at 7/13/2021 1439                   Point: Home exercise program (Done)     Learning Progress Summary           Patient Acceptance, E,TB, VU by DAVID at 7/13/2021 1439                   Point: Body mechanics (Done)     Learning Progress Summary           Patient Acceptance, E,TB, VU by DAVID at 7/13/2021 1439                   Point: Precautions (Done)     Learning Progress Summary           Patient Acceptance, E,TB, VU by DAVID at 7/13/2021 1439                               User Key     Initials Effective Dates Name Provider Type Discipline    DAVID 06/03/21 -  Lester Donis PT Physical Therapist PT              PT Recommendation and Plan  Anticipated Discharge Disposition (PT): home with outpatient therapy services  Planned Therapy Interventions (PT): balance training, gait training, transfer training, strengthening  Therapy Frequency (PT): 2 times/day  Plan of Care  Reviewed With: patient, family  Outcome Summary: Patient is a 45 year old female who presents with strength and mobility deficits tht impair her ability to safely ambulate and perform all transfers. She will benefit from skilled PT.  Outcome Measures     Row Name 07/13/21 1400             How much help from another person do you currently need...    Turning from your back to your side while in flat bed without using bedrails?  3  -DAVID      Moving from lying on back to sitting on the side of a flat bed without bedrails?  3  -DAVID      Moving to and from a bed to a chair (including a wheelchair)?  3  -DAVID      Standing up from a chair using your arms (e.g., wheelchair, bedside chair)?  3  -DAVID      Climbing 3-5 steps with a railing?  2  -DAVID      To walk in hospital room?  3  -DAVID      AM-PAC 6 Clicks Score (PT)  17  -DAVID         Functional Assessment    Outcome Measure Options  AM-PAC 6 Clicks Basic Mobility (PT)  -DAVID        User Key  (r) = Recorded By, (t) = Taken By, (c) = Cosigned By    Initials Name Provider Type    Lester Cortez PT Physical Therapist           Time Calculation:   PT Charges     Row Name 07/13/21 1440             Time Calculation    PT Received On  07/13/21  -DAVID      PT Goal Re-Cert Due Date  07/22/21  -DAVID         Untimed Charges    PT Eval/Re-eval Minutes  45  -DAVID         Total Minutes    Untimed Charges Total Minutes  45  -DAVID       Total Minutes  45  -DAVID        User Key  (r) = Recorded By, (t) = Taken By, (c) = Cosigned By    Initials Name Provider Type    Lester Cortez PT Physical Therapist        Therapy Charges for Today     Code Description Service Date Service Provider Modifiers Qty    38260736389 HC PT EVAL LOW COMPLEXITY 3 7/13/2021 Lester Donis PT GP 1          PT G-Codes  Outcome Measure Options: AM-PAC 6 Clicks Basic Mobility (PT)  AM-PAC 6 Clicks Score (PT): 17    Lester Donis PT  7/13/2021

## 2021-07-13 NOTE — PLAN OF CARE
Goal Outcome Evaluation:           Progress: improving  Outcome Summary: PATIENT TOLERATED CARE WELL, TOTAL KNEE REPLACEMENT TODAY, VITALS ARE STABLE TT RN

## 2021-07-13 NOTE — OP NOTE
TOTAL KNEE ARTHROPLASTY WITH SUSHANT NAVIGATION  Procedure Report    Patient Name:  Beth Mullins  YOB: 1975    Date of Surgery:  7/13/2021       Pre-op Diagnosis:   LEFT KNEE OA primary       Post-Op Diagnosis Codes:  Same    Procedure/CPT® Codes:      Procedure(s):  LEFT TOTAL KNEE ARTHROPLASTY WITH COMPUTER NAVIGATION    Staff:  Surgeon(s):  Don Acuna MD    Assistant: Rachana King RN; Vitaliy Allen    Anesthesia: General    Estimated Blood Loss: 50 mL    Implants:    Implant Name Type Inv. Item Serial No.  Lot No. LRB No. Used Action   CMT BONE PALACOS R HI/VISC 1X40 - [id number] Implant CMT BONE PALACOS R HI/VISC 1X40  [facility] [id number] Left 2 Implanted   ART/SRF KN PERSONA/VE PS CD/CR 4TO5 11MM LT - [id number] Implant ART/SRF KN PERSONA/VE PS CD/CR 4TO5 11MM LT  [employer] [id number] Left 1 Implanted   STEM TIB PERSONA CMT 5D SZC LT - [id number] Implant STEM TIB PERSONA CMT 5D SZC LT  [employer] [id number] Left 1 Implanted   COMP FEM/KN PERSONA CR CMT COCR NRW SZ4 LT - [id number] Implant COMP FEM/KN PERSONA CR CMT COCR NRW SZ4 LT  [employer] [id number] Left 1 Implanted   PAT PERSONA ALLPOLY CMT 7.5X26MM - [id number] Implant PAT PERSONA ALLPOLY CMT 7.5X26MM  [employer] [id number] Left 1 Implanted       Specimen:          None      Complications: None    Description of Procedure: See H&P for risks and benefits.The patient was taken to the operating room and placed supine on the operating table after adductor canal block was done in preoperative holding. After general endotracheal anesthesia was established, the left knee was examined.  The patient had full range of motion and no instability.  The left lower extremity was prepped and draped in the standard usual fashion using alcohol and ChloraPrep.  A standard incision was made one to two fingerbreadths superior to the superior pole of the patella down to the medial aspect of the tibial tubercle with a knife.   Dissection was carried down raising full thickness skin flaps laterally and medially.  A medial rectus parapatellar approach was undertaken for a.  Appropriate soft tissue release was done in a standard fashion with a knife and a curved osteotome.  The patient had significant signs of osteoarthritis and the knee was brought up into flexion.  The collateral ligaments were protected with retractors and the tracking device for the computer navigation software was placed in the distal femur and distal femoral points were mapped out according to the computer navigation software and the distal femoral cut was made.  The distal femoral cut was in 0 degrees of varus and valgus and 2 degrees of flexion.  It was accepted.  The posterior condylar referencing guide was then pinned.  The femur was sized.  The cutting block was placed in the distal femur and all of the femoral cuts were made, anterior posterior, anterior chamfer, and posterior chamfer cuts.  The bone, from the cuts, was removed.  The PCL retractor was placed and attention was focused on the tibia.  The tracking device was mounted on the medial tibial plateau in standard fashion.  All the femoral points were mapped out according to the computer navigation software and the proximal tibial cut was made.  The resulting cut was in 0 degrees of varus and valgus with a 5 degree posterior slope.  It was accepted.  The posterior condylar spurs were removed, along with the medial and lateral meniscus.  Trials were placed and the extramedullary guide and the trials were pointing to the base of the second metatarsal in the center of the ankle.  The patella was calipered and cut to accommodate a medialized patellar button trial.  The knee was taken through range of motion, had equal flexion-extension gaps, was stable to varus and valgus stress and had full passive motion.  The trials were removed and the tibia was punched in the standard fashion and the bone ends were  copiously irrigated with Bacitracin Simpulse irrigation as the cement was mixed. The tibial implant was cemented in position, followed by the size the femur.  They were malleted and cemented in position and the trial poly was placed.  The knee was held in extension and the patella was cemented into place and held with a clamp.  The knee was held in extension until the cement had hardened.  Excess cement was then removed from the implant edges.  Trials were undertaken and the correct was chosen polyethylene insert was chosen and was implanted.  The knee had the same range of motion and stability as the trials.  The wound was copiously irrigated with Bacitracin Simpulse irrigation and the arthrotomy was closed with Ethibond.  More copious irrigation followed and the deep fat was closed with and 0 Vicryl.  The subcutaneous tissue was closed with 2-0 Vicryl and the skin was closed with staples.  The closure was done in 45 degrees of knee flexion.  The incision was washed and dried and Aquacel dressing and long tejas hose was applied to the lower extremity. The patient tolerated the procedure well, was extubated, and taken to the recovery room.           Don Acuan MD     Date: 7/13/2021  Time: 12:33 EDT

## 2021-07-13 NOTE — ANESTHESIA PROCEDURE NOTES
Peripheral Block      Patient reassessed immediately prior to procedure    Patient location during procedure: pre-op  Start time: 7/13/2021 10:10 AM  Stop time: 7/13/2021 10:13 AM  Reason for block: at surgeon's request and post-op pain management  Preanesthetic Checklist  Completed: patient identified, IV checked, site marked, risks and benefits discussed, surgical consent, monitors and equipment checked, pre-op evaluation and timeout performed  Prep:  Pt Position: supine  Sterile barriers:alcohol skin prep, partial drape, cap, washed/disinfected hands, mask and gloves  Prep: ChloraPrep  Patient monitoring: blood pressure monitoring, continuous pulse oximetry and EKG  Procedure  Sedation:yes  Performed under: local infiltration  Guidance:ultrasound guided and nerve stimulator  ULTRASOUND INTERPRETATION. Using ultrasound guidance a 20 G gauge needle was placed in close proximity to the nerve, at which point, under ultrasound guidance anesthetic was injected in the area of the nerve and spread of the anesthesia was seen on ultrasound in close proximity thereto.  There were no abnormalities seen on ultrasound; a digital image was taken; and the patient tolerated the procedure with no complications. Images:still images obtained, printed/placed on chart    Laterality:left  Block Type:adductor canal block  Injection Technique:single-shot  Needle Type:echogenic  Needle Gauge:20 G (4in)  Resistance on Injection: none    Medications Used: bupivacaine-EPINEPHrine PF (MARCAINE w/EPI) 0.5% -[id number] injection, 30 mL      Post Assessment  Injection Assessment: negative aspiration for heme, no paresthesia on injection and incremental injection  Patient Tolerance:comfortable throughout block  Complications:no  Additional Notes  The block or continuous infusion is requested by the referring physician for management of postoperative pain, or pain related to a procedure. Ultrasound guidance (deemed medically necessary). Painless  injection, pt was awake and conversant during the procedure without complications. Needle and surrounding structures visualized throughout procedure. No adverse reactions or complications seen during this period. Post-procedure image showed no signs of complication, and anatomy was consistent with an uncomplicated nerve blockade.

## 2021-07-13 NOTE — H&P
Patient Name: Beth Mullins   Patient ID: [id number]   Sex: Female   YOB: 1975    Referring Provider: Amber BARTON    Visit Date: July 12, 2021    Provider: Don Acuna MD   Location: [facility]   Location Address: [address] [place] [address]  [id number]   Location Phone: [phone]           Chief Complaint  · Left Knee Osteoarthritis       History Of Present Illness  Beth Mullins is a 45 year old /White female who presents today to [facility].       Patient presents today for a follow-up of left knee. Patient has a history of left knee osteoarthritis that is treated conservatively. The last injections she received in October did not provide her with any relief. She states her left knee pain has been increasing since she has last seen us. She states the quality of her life has plummeted. She is unable to walk around the grocery store without having to sit down. She states after her grocery store trips she is unable to walk the rest of the day. The pain in her left knee is worse at night. She states she has trouble with her knee at work and fears that it will begin to affect her work quality. She states her knee gives way on her and causes her to fall. Patient has left knee pain that limits everyday activity, falls, and difficulty getting out of an automobile, difficulty sleeping, limited activities of daily living such as bathing, dressing, walking, difficulty climbing stairs, and difficulty sitting for long periods of time. Patient works as a .        Past Medical History  Anemia, Unspecified; Arthritis; Hypertension; Seasonal allergies           Past Surgical History  Cesarian Section; Hysterectomy           Medication List  cyclobenzaprine 10 mg oral tablet; fluticasone propionate 50 mcg/actuation nasal spray,suspension; hydrochlorothiazide 12.5 mg oral capsule; ibuprofen 800 mg oral tablet; lisinopril 10 mg oral tablet; loratadine 10  mg oral tablet; Medrol (Abel) 4 mg oral tablets,dose pack; NABUMETONE 750MG TABLET           Allergy List  morphine        Allergies Reconciled  Family Medical History  Diabetes, unspecified type; Blood Diseases; Family history of certain chronic disabling diseases; arthritis; Osteoporosis; Family history of Arthritis           Social History  Alcohol Use (Current some day); Claustophobic (Unknown); lives with children; lives with spouse; .; Recreational Drug Use (Never); Student.; Tobacco (Current every day); Working           Review of Systems  · Constitutional  · Denies  · : fever, chills, weight loss  · Cardiovascular  · Denies  · : chest pain, shortness of breath  · Gastrointestinal  · Denies  · : liver disease, heartburn, nausea, blood in stools  · Genitourinary  · Denies  · : painful urination, blood in urine  · Integument  · Denies  · : rash, itching  · Neurologic  · Denies  · : headache, weakness, loss of consciousness  · Musculoskeletal  · Denies  · : painful, swollen joints  · Psychiatric  · Denies  · : drug/alcohol addiction, anxiety, depression       Vitals                                       Date Time BP Position Site L\R Cuff Size HR RR TEMP (F) WT  HT  BMI kg/m2 BSA m2 O2 Sat FR L/min FiO2 HC        05/26/2021 09:31 AM                 222lbs 16oz 5'   43.55 2.07                   Physical Examination  · Constitutional  · Appearance  · : well developed, well-nourished, no obvious deformities present  · Head and Face  · Head  · :   · Inspection  · : normocephalic  · Face  · :   · Inspection  · : no facial lesions  · Eyes  · Conjunctivae  · : conjunctivae normal  · Sclerae  · : sclerae white  · Ears, Nose, Mouth and Throat  · Ears  · :   · External Ears  · : appearance within normal limits  · Hearing  · : intact  · Nose  · :   · External Nose  · : appearance normal  · Neck  · Inspection/Palpation  · : normal appearance  · Range of Motion  · : full range of motion  · Respiratory  · Respiratory  Effort  · : breathing unlabored  · Inspection of Chest  · : normal appearance  · Auscultation of Lungs  · : no audible wheezing or rales  · Cardiovascular  · Heart  · : regular rate  · Gastrointestinal  · Abdominal Examination  · : soft and non-tender  · Skin and Subcutaneous Tissue  · General Inspection  · : intact, no rashes  · Psychiatric  · General  · : Alert and oriented x3  · Judgement and Insight  · : judgment and insight intact  · Mood and Affect  · : mood normal, affect appropriate  · Left Knee  · Inspection  · : Tenderness posteriorly on the hamstrings and gastrocnemius. Tenderness along posterolateral side of leg. Tender medial and lateral joint line. Positive Thessaly. Negative Lachman. Negative posterior sag. Stable to valgus/varus stress. Good strength in quadriceps, hamstrings, dorsiflexors, and plantar flexors. Sensation grossly intact. Neurovascular intact. Full weight bearing. Limping gait. Skin intact. No swelling, skin discoloration or atrophy. Positive crepitus. Full extension. Flexion to 125.   · In Office Procedures  · View  · : LAT/SUNRISE/STANDING   · Site  · : left, knee  · Indication  · : Left Knee Osteoarthritis  · Study  · : X-rays ordered, taken in the office, and reviewed today.  · Xray  · : Advanced degenerative changes of the left knee that is consistent with bone on bone osteoarthritis. No fracture or dislocation.             Assessment  · Primary osteoarthritis of left knee     715.16/M17.12       Plan  · Orders  · Knee (Left) 3 views X-Ray [facility] Preferred View ([id number]) - 715.16/M17.12 - 05/26/2021  · ACO-17: Screened for tobacco use AND received tobacco cessation intervention (4004F) - - 05/27/2021  · Medications  · Medications have been Reconciled  · Transition of Care or Provider Policy  · Instructions  · Dr. Acuna saw and examined the patient and agrees with plan.   · X-rays reviewed by Dr. Acuna.  · Reviewed the patient's Past Medical, Social, and Family history as well as the  ROS at today's visit, no changes.  · Call or return if worsening symptoms.  · Discussed surgery.  · Risks/benefits discussed with patient including, but not limited to: infection, bleeding, neurovascular damage, malunion, nonunion, aesthetic deformity, need for further surgery, and death.  · Discussed with patient the implant type being used during surgery and patient understands and desires to proceed.  · Surgery pamphlet given.  · Follow Up post-op.  · Educated on risk of smoking related to procedure. Discussed options for smoking cessation.  · Educated on risk of elevated BMI related to procedure. Discussed options for weight loss/decreasing BMI prior to procedure including dietician consult, weight loss options and exercise program.  · Discussed treatment plans and diagnosis with the patient. Discussed operative vs non-operative measures. Patient has failed steroid injections and viscosupplementation. The left knee pain is throwing her hip off and she fears she may be unable to work anymore due to the pain. Patient wishes to proceed with a left total knee arthroplasty. 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   · Don Acuna MD  · 07/12/21  ·

## 2021-07-13 NOTE — ANESTHESIA POSTPROCEDURE EVALUATION
Patient: Beth Mullins    Procedure Summary     Date: 07/13/21 Room / Location: [facility] OR 06 / [facility] MAIN OR    Anesthesia Start: 1057 Anesthesia Stop: 1235    Procedure: LEFT TOTAL KNEE ARTHROPLASTY WITH COMPUTER NAVIGATION (Left Knee) Diagnosis: (LEFT KNEE OA)    Surgeons: Don Acuna MD Provider: Capri Villanueva MD    Anesthesia Type: general, regional ASA Status: 3          Anesthesia Type: general, regional    Vitals  Vitals Value Taken Time   /106 07/13/21 1315   Temp 36.6 °C (97.8 °F) 07/13/21 1300   Pulse 81 07/13/21 1318   Resp 20 07/13/21 1300   SpO2 95 % 07/13/21 1318   Vitals shown include unvalidated device data.        Post Anesthesia Care and Evaluation    Patient location during evaluation: bedside  Patient participation: complete - patient participated  Level of consciousness: awake  Pain management: adequate  Airway patency: patent  Anesthetic complications: No anesthetic complications  PONV Status: none  Cardiovascular status: acceptable and stable  Respiratory status: acceptable  Hydration status: acceptable    Comments: An Anesthesiologist was personally participated in the most demanding procedures (including induction and emergence if applicable) in the anesthesia plan, monitored the course of anesthesia administration at frequent intervals and remained physically present and available for immediate diagnosis and treatment of emergencies.

## 2021-07-13 NOTE — ANESTHESIA PREPROCEDURE EVALUATION
Anesthesia Evaluation     Patient summary reviewed and Nursing notes reviewed   no history of anesthetic complications:  NPO Solid Status: > 8 hours  NPO Liquid Status: > 2 hours           Airway   Mallampati: III  TM distance: >3 FB  Neck ROM: full  No difficulty expected  Dental      Pulmonary - normal exam    breath sounds clear to auscultation  (+) a smoker Current Smoked day of surgery,   Cardiovascular - normal exam  Exercise tolerance: good (4-7 METS)    Rhythm: regular    (+) hypertension,       Neuro/Psych- negative ROS  GI/Hepatic/Renal/Endo    (+) morbid obesity, PUD,      Musculoskeletal     Abdominal    Substance History - negative use     OB/GYN negative ob/gyn ROS         Other   arthritis,                      Anesthesia Plan    ASA 3     general and regional   (Patient understands anesthesia not responsible for dental damage. Regional anesthesia options discussed with patient. Pt accepts regional block.)  intravenous induction     Anesthetic plan, all risks, benefits, and alternatives have been provided, discussed and informed consent has been obtained with: patient.  Use of blood products discussed with patient .   Plan discussed with CRNA.

## 2021-07-13 NOTE — PLAN OF CARE
Goal Outcome Evaluation:  Plan of Care Reviewed With: patient, family           Outcome Summary: Patient is a 45 year old female who presents with strength and mobility deficits tht impair her ability to safely ambulate and perform all transfers. She will benefit from skilled PT.

## 2021-07-14 LAB
ALBUMIN SERPL-MCNC: 3.7 G/DL (ref 3.5–5.2)
ALBUMIN/GLOB SERPL: 1.6 G/DL
ALP SERPL-CCNC: 68 U/L (ref 39–117)
ALT SERPL W P-5'-P-CCNC: 16 U/L (ref 1–33)
ANION GAP SERPL CALCULATED.3IONS-SCNC: 12 MMOL/L (ref 5–15)
AST SERPL-CCNC: 17 U/L (ref 1–32)
BASOPHILS # BLD AUTO: 0.02 10*3/MM3 (ref 0–0.2)
BASOPHILS NFR BLD AUTO: 0.1 % (ref 0–1.5)
BILIRUB SERPL-MCNC: <0.2 MG/DL (ref 0–1.2)
BUN SERPL-MCNC: 11 MG/DL (ref 6–20)
BUN/CREAT SERPL: 14.3 (ref 7–25)
CALCIUM SPEC-SCNC: 8.7 MG/DL (ref 8.6–10.5)
CHLORIDE SERPL-SCNC: 104 MMOL/L (ref 98–107)
CO2 SERPL-SCNC: 20 MMOL/L (ref 22–29)
CREAT SERPL-MCNC: 0.77 MG/DL (ref 0.57–1)
DEPRECATED RDW RBC AUTO: 41.9 FL (ref 37–54)
EOSINOPHIL # BLD AUTO: 0 10*3/MM3 (ref 0–0.4)
EOSINOPHIL NFR BLD AUTO: 0 % (ref 0.3–6.2)
ERYTHROCYTE [DISTWIDTH] IN BLOOD BY AUTOMATED COUNT: 12.1 % (ref 12.3–15.4)
GFR SERPL CREATININE-BSD FRML MDRD: 82 ML/MIN/1.73
GLOBULIN UR ELPH-MCNC: 2.3 GM/DL
GLUCOSE SERPL-MCNC: 166 MG/DL (ref 65–99)
HCT VFR BLD AUTO: 31.4 % (ref 34–46.6)
HGB BLD-MCNC: 10.6 G/DL (ref 12–15.9)
IMM GRANULOCYTES # BLD AUTO: 0.08 10*3/MM3 (ref 0–0.05)
IMM GRANULOCYTES NFR BLD AUTO: 0.4 % (ref 0–0.5)
LYMPHOCYTES # BLD AUTO: 1.4 10*3/MM3 (ref 0.7–3.1)
LYMPHOCYTES NFR BLD AUTO: 7 % (ref 19.6–45.3)
MAGNESIUM SERPL-MCNC: 1.9 MG/DL (ref 1.6–2.6)
MCH RBC QN AUTO: 31.9 PG (ref 26.6–33)
MCHC RBC AUTO-ENTMCNC: 33.8 G/DL (ref 31.5–35.7)
MCV RBC AUTO: 94.6 FL (ref 79–97)
MONOCYTES # BLD AUTO: 1.07 10*3/MM3 (ref 0.1–0.9)
MONOCYTES NFR BLD AUTO: 5.4 % (ref 5–12)
NEUTROPHILS NFR BLD AUTO: 17.38 10*3/MM3 (ref 1.7–7)
NEUTROPHILS NFR BLD AUTO: 87.1 % (ref 42.7–76)
NRBC BLD AUTO-RTO: 0 /100 WBC (ref 0–0.2)
PLATELET # BLD AUTO: 376 10*3/MM3 (ref 140–450)
PMV BLD AUTO: 9.3 FL (ref 6–12)
POTASSIUM SERPL-SCNC: 4.2 MMOL/L (ref 3.5–5.2)
PROT SERPL-MCNC: 6 G/DL (ref 6–8.5)
RBC # BLD AUTO: 3.32 10*6/MM3 (ref 3.77–5.28)
SODIUM SERPL-SCNC: 136 MMOL/L (ref 136–145)
WBC # BLD AUTO: 19.95 10*3/MM3 (ref 3.4–10.8)

## 2021-07-14 PROCEDURE — 94760 N-INVAS EAR/PLS OXIMETRY 1: CPT

## 2021-07-14 PROCEDURE — 85025 COMPLETE CBC W/AUTO DIFF WBC: CPT | Performed by: INTERNAL MEDICINE

## 2021-07-14 PROCEDURE — 97530 THERAPEUTIC ACTIVITIES: CPT

## 2021-07-14 PROCEDURE — 94799 UNLISTED PULMONARY SVC/PX: CPT

## 2021-07-14 PROCEDURE — 97535 SELF CARE MNGMENT TRAINING: CPT

## 2021-07-14 PROCEDURE — 97165 OT EVAL LOW COMPLEX 30 MIN: CPT

## 2021-07-14 PROCEDURE — 25010000002 HYDROMORPHONE 1 MG/ML SOLUTION

## 2021-07-14 PROCEDURE — 99226 PR SBSQ OBSERVATION CARE/DAY 35 MINUTES: CPT | Performed by: INTERNAL MEDICINE

## 2021-07-14 PROCEDURE — 25010000003 CEFAZOLIN IN DEXTROSE 2-4 GM/100ML-% SOLUTION: Performed by: ORTHOPAEDIC SURGERY

## 2021-07-14 PROCEDURE — 80053 COMPREHEN METABOLIC PANEL: CPT | Performed by: INTERNAL MEDICINE

## 2021-07-14 PROCEDURE — 97150 GROUP THERAPEUTIC PROCEDURES: CPT

## 2021-07-14 PROCEDURE — 97116 GAIT TRAINING THERAPY: CPT

## 2021-07-14 PROCEDURE — 83735 ASSAY OF MAGNESIUM: CPT | Performed by: INTERNAL MEDICINE

## 2021-07-14 PROCEDURE — 25010000002 KETOROLAC TROMETHAMINE PER 15 MG: Performed by: ORTHOPAEDIC SURGERY

## 2021-07-14 RX ORDER — ASPIRIN 325 MG
325 TABLET, DELAYED RELEASE (ENTERIC COATED) ORAL DAILY
Status: DISCONTINUED | OUTPATIENT
Start: 2021-07-15 | End: 2021-07-15 | Stop reason: HOSPADM

## 2021-07-14 RX ORDER — CETIRIZINE HYDROCHLORIDE 10 MG/1
10 TABLET ORAL DAILY
Status: DISCONTINUED | OUTPATIENT
Start: 2021-07-14 | End: 2021-07-15 | Stop reason: HOSPADM

## 2021-07-14 RX ADMIN — KETOROLAC TROMETHAMINE 15 MG: 15 INJECTION, SOLUTION INTRAMUSCULAR; INTRAVENOUS at 20:36

## 2021-07-14 RX ADMIN — HYDROCODONE BITARTRATE AND ACETAMINOPHEN 2 TABLET: 7.5; 325 TABLET ORAL at 11:59

## 2021-07-14 RX ADMIN — HYDROMORPHONE HYDROCHLORIDE 0.5 MG: 1 INJECTION, SOLUTION INTRAMUSCULAR; INTRAVENOUS; SUBCUTANEOUS at 22:51

## 2021-07-14 RX ADMIN — SODIUM CHLORIDE, PRESERVATIVE FREE 3 ML: 5 INJECTION INTRAVENOUS at 20:37

## 2021-07-14 RX ADMIN — HYDROCODONE BITARTRATE AND ACETAMINOPHEN 2 TABLET: 7.5; 325 TABLET ORAL at 08:03

## 2021-07-14 RX ADMIN — CETIRIZINE HYDROCHLORIDE 10 MG: 10 TABLET, FILM COATED ORAL at 08:04

## 2021-07-14 RX ADMIN — SODIUM CHLORIDE, PRESERVATIVE FREE 3 ML: 5 INJECTION INTRAVENOUS at 08:04

## 2021-07-14 RX ADMIN — APIXABAN 2.5 MG: 2.5 TABLET, FILM COATED ORAL at 08:04

## 2021-07-14 RX ADMIN — HYDROCODONE BITARTRATE AND ACETAMINOPHEN 2 TABLET: 7.5; 325 TABLET ORAL at 21:35

## 2021-07-14 RX ADMIN — HYDROCODONE BITARTRATE AND ACETAMINOPHEN 1 TABLET: 7.5; 325 TABLET ORAL at 17:13

## 2021-07-14 RX ADMIN — KETOROLAC TROMETHAMINE 15 MG: 15 INJECTION, SOLUTION INTRAMUSCULAR; INTRAVENOUS at 13:21

## 2021-07-14 RX ADMIN — HYDROCODONE BITARTRATE AND ACETAMINOPHEN 2 TABLET: 7.5; 325 TABLET ORAL at 02:48

## 2021-07-14 RX ADMIN — CEFAZOLIN SODIUM 2 G: 2 INJECTION, SOLUTION INTRAVENOUS at 02:48

## 2021-07-14 NOTE — PROGRESS NOTES
Psychiatric   Hospitalist Progress Note  Date: 2021  Patient Name: Beth Mullins  : 1975  MRN: [id number]  Date of admission: 2021    Subjective   Subjective     Chief Complaint:   Knee pain    Summary:   Patient is a 45-year-old female past medical history significant for osteoarthritis who presents for scheduled Left TKA. Patient states that prior to the procedure the pain had gradually been worsening over the past several years. The patient reports pain and functional impairment including activities of daily living, they have moderate to severe resting pain, chronic inflammation, and swelling. The patient states that this pain is no longer relieved with conservative therapies including NSAIDs, injections, and physical therapy. The pain is dull and achy in nature, nonradiating, worse with activity. Because of the above the patient is admitted for an postoperative pain control, symptom management and rehab evaluation.    Interval Followup:   Patient having increased swelling, bruising and redness of her knee, orthopedic surgery wanting to keep overnight for evaluation    Review of Systems  Denies nausea vomiting diarrhea  Chest pain above additions or shortness of breath  No fever no chills    Objective   Objective     Vitals:   Temp:  [98.2 °F (36.8 °C)-98.8 °F (37.1 °C)] 98.6 °F (37 °C)  Heart Rate:  [] 82  Resp:  [17-20] 18  BP: (136-164)/(80-94) 136/91  Flow (L/min):  [3] 3    Physical Exam   General appearance: NAD, conversant   Eyes: anicteric sclerae, moist conjunctivae; no lid-lag; PERRLA  HENT: Atraumatic; oropharynx clear with moist mucous membranes and no mucosal ulcerations; normal hard and soft palate  Neck: Trachea midline; FROM, supple, no thyromegaly or lymphadenopathy  Lungs: CTA, with normal respiratory effort and no intercostal retractions  CV: Regular Rate and Rhythm, no Murmurs, Rubs, or Gallops   Abdomen: Soft, non-tender; no masses or  Heptosplenomegally  Extremities: No peripheral edema or extremity lymphadenopathy  Skin: Normal temperature, turgor and texture; no rash, ulcers or subcutaneous nodules, postsurgical changes of the knee noted  Psych: Appropriate affect, alert and oriented to person, place and time  Neuro: CN II - XII intact no motor deficits, no sensory defecits    Result Review    Result Review:  I have personally reviewed the results from the time of this admission to 7/14/2021 16:18 EDT and agree with these findings:  [x]  Laboratory  [x]  Microbiology  [x]  Radiology  []  EKG/Telemetry   []  Cardiology/Vascular   []  Pathology  []  Old records  []  Other:    Assessment/Plan   Assessment / Plan     Left total knee arthroplasty  Hypertension  Nicotine dependence  GERD  Vitamin D deficiency  Seasonal allergies     Plan:  · Patient is admitted to the hospital for further workup and management of above  · Patient status post left total knee arthroplasty without any immediate postoperative complications  · Postoperative pain control with IV and p.o. narcotics  · DC anticoagulation over concern of hematoma, will use aspirin for DVT prophylaxis  · Zofran if needed for nausea  · Hold home antihypertensives, resume as needed  · Repeat CBC in a.m.  · Resume appropriate home medications  · PT/OT/social work  · Clinical course will dictate further management     Reviewed patients labs and imaging, and discussed with patient and nurse at bedside, will discuss with orthopedic surgery    DVT prophylaxis:  Medical and mechanical DVT prophylaxis orders are present.    CODE STATUS:          Electronically signed by Don Umaña MD, 07/14/21, 4:18 PM EDT.

## 2021-07-14 NOTE — THERAPY TREATMENT NOTE
Acute Care - Physical Therapy Treatment Note   [facility]     Patient Name: Beth Mullins  : 1975  MRN: [id number]  Today's Date: 2021   Onset of Illness/Injury or Date of Surgery: 21  Gait- individual; ther- ex -group setting; 5 participants        PT Assessment (last 12 hours)      PT Evaluation and Treatment     Row Name 21 1211          Physical Therapy Time and Intention    Subjective Information  no complaints  -     Document Type  therapy note (daily note)  -     Mode of Treatment  physical therapy;group therapy;individual therapy  -     Patient Effort  fair  -     Row Name 21 1211          Pain Scale: FACES Pre/Post-Treatment    Posttreatment Pain Rating  2-->hurts little bit  -     Row Name 21 1211          Range of Motion (ROM)    Range of Motion  -- Pt L knee AAROM at 90 degrees flex and 8 degrees extension.  -[facility] Name 21 1211          Strength (Manual Muscle Testing)    Strength (Manual Muscle Testing)  -- Pt L knee extension strength at 3/5.  -     Row Name 21 1211          Transfers    Transfers  sit-stand transfer;stand-sit transfer  -     Sit-Stand [place] (Transfers)  contact guard  -     Stand-Sit [place] (Transfers)  contact guard  -     Row Name 21 121          Sit-Stand Transfer    Assistive Device (Sit-Stand Transfers)  walker, front-wheeled  -[facility] Name 21 121          Stand-Sit Transfer    Assistive Device (Stand-Sit Transfers)  walker, front-wheeled  -[facility] Name 21 121          Gait/Stairs (Locomotion)    Gait/Stairs Locomotion  gait/ambulation independence;gait/ambulation assistive device;distance ambulated  -     [place] Level (Gait)  contact guard  [facility]     Assistive Device (Gait)  walker, front-wheeled  -     Distance in Feet (Gait)  110  -     Negotiation (Stairs)  stairs independence;handrail location;number of steps;ascending technique;descending technique  -      [place] Level (Stairs)  contact guard  -RH     Handrail Location (Stairs)  left side (ascending);right side (ascending)  -RH     Number of Steps (Stairs)  5 x 2  -RH     Ascending Technique (Stairs)  step-to-step  -RH     Descending Technique (Stairs)  step-to-step  -RH     Row Name             Wound 07/13/21 1146 Left anterior knee Incision    Wound - Properties Group Placement Date: 07/13/21  -SC Placement Time: 1146  -SC Side: Left  -SC Orientation: anterior  -SC Location: knee  -SC Primary Wound Type: Incision  -SC    Retired Wound - Properties Group Date first assessed: 07/13/21  -SC Time first assessed: 1146  -SC Side: Left  -SC Location: knee  -SC Primary Wound Type: Incision  -SC    Row Name 07/14/21 1211          Progress Summary (PT)    Progress Toward Functional Goals (PT)  progress toward functional goals is fair  -RH       User Key  (r) = Recorded By, (t) = Taken By, (c) = Cosigned By    Initials Name Provider Type    Kayla Paz RN Registered Nurse    RH Crispin Jackman PTA Physical Therapy Assistant       Left Knee Ther-ex   Exercise  Reps  Sets    Long arc Quads   10 2   Short arc Quads   10 2   Heel Slides  10 2   Ankle Pumps  10 2   Quad sets  10 2   Glut sets  10 2   Straight leg raise  10 2          Physical Therapy Education                 Title: PT OT SLP Therapies (Done)     Topic: Physical Therapy (Done)     Point: Mobility training (Done)     Learning Progress Summary           Patient Acceptance, E,TB, VU by DAVID at 7/13/2021 1439                   Point: Home exercise program (Done)     Learning Progress Summary           Patient Acceptance, E,TB, VU by DAVID at 7/13/2021 1439                   Point: Body mechanics (Done)     Learning Progress Summary           Patient Acceptance, E,TB, VU by DAVID at 7/13/2021 1439                   Point: Precautions (Done)     Learning Progress Summary           Patient Acceptance, E,TB, VU by DAVID at 7/13/2021 1439                                User Key     Initials Effective Dates Name Provider Type Lazara    DAVID 06/03/21 -  Lester Donis, PT Physical Therapist PT              PT Recommendation and Plan     Progress Summary (PT)  Progress Toward Functional Goals (PT): progress toward functional goals is fair  Outcome Measures     Row Name 07/14/21 1200 07/13/21 1400          How much help from another person do you currently need...    Turning from your back to your side while in flat bed without using bedrails?  3  -RH  3  -DAVID     Moving from lying on back to sitting on the side of a flat bed without bedrails?  3  -RH  3  -DAVID     Moving to and from a bed to a chair (including a wheelchair)?  3  -RH  3  -DAVID     Standing up from a chair using your arms (e.g., wheelchair, bedside chair)?  3  -RH  3  -DAVID     Climbing 3-5 steps with a railing?  4  -RH  2  -DAVID     To walk in hospital room?  4  -RH  3  -DAVID     AM-PAC 6 Clicks Score (PT)  20  -RH  17  -DAVID        Functional Assessment    Outcome Measure Options  --  AM-PAC 6 Clicks Basic Mobility (PT)  -DAVID       User Key  (r) = Recorded By, (t) = Taken By, (c) = Cosigned By    Initials Name Provider Type    Crispin Wilson PTA Physical Therapy Assistant    Lester Cortez, PT Physical Therapist           Time Calculation:   PT Charges     Row Name 07/14/21 1210             Time Calculation    PT Received On  07/14/21  -RH         Timed Charges    76687 - Gait Training Minutes   9  -RH      65949 - PT Therapeutic Activity Minutes  3  -RH         Untimed Charges    PT Group Therapy Minutes  30  -RH         Total Minutes    Timed Charges Total Minutes  12  -RH      Untimed Charges Total Minutes  30  -RH       Total Minutes  42  -RH        User Key  (r) = Recorded By, (t) = Taken By, (c) = Cosigned By    Initials Name Provider Type    Crispin Wilson PTA Physical Therapy Assistant        Therapy Charges for Today     Code Description Service Date Service Provider Modifiers Qty    09632762420 HC GAIT  TRAINING EA 15 MIN 7/14/2021 Crispin Jackman, PTA GP 1    70824344819 HC PT THER PROC GROUP 7/14/2021 Crispin Jackman PTA GP 1          PT G-Codes  Outcome Measure Options: AM-PAC 6 Clicks Daily Activity (OT), Optimal Instrument  AM-PAC 6 Clicks Score (PT): 20  AM-PAC 6 Clicks Score (OT): 22    Crispin Jackman PTA  7/14/2021

## 2021-07-14 NOTE — THERAPY TREATMENT NOTE
Acute Care - Physical Therapy Treatment Note  [facility]     Patient Name: Beth Mullins  : 1975  MRN: [id number]  Today's Date: 2021   Onset of Illness/Injury or Date of Surgery: 21  Gait- individual; ther- ex -group setting; 5 participants        PT Assessment (last 12 hours)      PT Evaluation and Treatment     Row Name 21 1448 21 1211       Physical Therapy Time and Intention    Subjective Information  no complaints  -RH  no complaints  -    Document Type  therapy note (daily note)  -RH  therapy note (daily note)  -    Mode of Treatment  physical therapy;group therapy;individual therapy  -  physical therapy;group therapy;individual therapy  -    Patient Effort  good  -RH  fair  -    Row Name 21 1448 21 1211       Pain Scale: FACES Pre/Post-Treatment    Posttreatment Pain Rating  2-->hurts little bit  -  2-->hurts little bit  -    Row Name 21 1211          Range of Motion (ROM)    Range of Motion  -- Pt L knee AAROM at 90 degrees flex and 8 degrees extension.  -     Row Name 21 1211          Strength (Manual Muscle Testing)    Strength (Manual Muscle Testing)  -- Pt L knee extension strength at 3/5.  -     Row Name 21 1448 21 1211       Transfers    Transfers  sit-stand transfer;stand-sit transfer  -  sit-stand transfer;stand-sit transfer  -    Sit-Stand [place] (Transfers)  contact guard  -  contact guard  -    Stand-Sit [place] (Transfers)  contact guard  -  contact guard  -    Row Name 21 1448 21 1211       Sit-Stand Transfer    Assistive Device (Sit-Stand Transfers)  walker, front-wheeled  -  walker, front-wheeled  -    Row Name 21 1448 21 1211       Stand-Sit Transfer    Assistive Device (Stand-Sit Transfers)  walker, front-wheeled  -  walker, front-wheeled  -    Row Name 21 1448 21 1211       Gait/Stairs (Locomotion)    Gait/Stairs Locomotion   gait/ambulation independence;gait/ambulation assistive device;distance ambulated  -RH  gait/ambulation independence;gait/ambulation assistive device;distance ambulated  -RH    [place] Level (Gait)  contact guard  -RH  contact guard  -RH    Assistive Device (Gait)  walker, front-wheeled  -RH  walker, front-wheeled  -RH    Distance in Feet (Gait)  100  -RH  110  -RH    Negotiation (Stairs)  --  stairs independence;handrail location;number of steps;ascending technique;descending technique  -RH    [place] Level (Stairs)  --  contact guard  -RH    Handrail Location (Stairs)  --  left side (ascending);right side (ascending)  -RH    Number of Steps (Stairs)  --  5 x 2  -RH    Ascending Technique (Stairs)  --  step-to-step  -RH    Descending Technique (Stairs)  --  step-to-step  -RH    Row Name             Wound 07/13/21 1146 Left anterior knee Incision    Wound - Properties Group Placement Date: 07/13/21  -SC Placement Time: 1146  -SC Side: Left  -SC Orientation: anterior  -SC Location: knee  -SC Primary Wound Type: Incision  -SC    Retired Wound - Properties Group Date first assessed: 07/13/21  -SC Time first assessed: 1146  -SC Side: Left  -SC Location: knee  -SC Primary Wound Type: Incision  -SC    Row Name 07/14/21 1448 07/14/21 1211       Progress Summary (PT)    Progress Toward Functional Goals (PT)  progress toward functional goals is fair  -RH  progress toward functional goals is fair  -RH      User Key  (r) = Recorded By, (t) = Taken By, (c) = Cosigned By    Initials Name Provider Type    Kayla Paz RN Registered Nurse    RH Crispin Jackman PTA Physical Therapy Assistant       Left Knee Ther-ex   Exercise  Reps  Sets    Long arc Quads   10 2   Short arc Quads   10 2   Heel Slides  10 2   Ankle Pumps  10 2   Quad sets  10 2   Glut sets  10 2   Straight leg raise  10 2          Physical Therapy Education                 Title: PT OT SLP Therapies (Done)     Topic: Physical Therapy (Done)      Point: Mobility training (Done)     Learning Progress Summary           Patient Acceptance, E,TB, VU,DU by YAW at 7/14/2021 1255    Acceptance, E,TB, VU by DAVID at 7/13/2021 1439                   Point: Home exercise program (Done)     Learning Progress Summary           Patient Acceptance, E,TB, VU,DU by BB at 7/14/2021 1255    Acceptance, E,TB, VU by DAVID at 7/13/2021 1439                   Point: Body mechanics (Done)     Learning Progress Summary           Patient Acceptance, E,TB, VU,DU by YAW at 7/14/2021 1255    Acceptance, E,TB, VU by DAVID at 7/13/2021 1439                   Point: Precautions (Done)     Learning Progress Summary           Patient Acceptance, E,TB, VU,DU by YAW at 7/14/2021 1255    Acceptance, E,TB, VU by DAVID at 7/13/2021 1439                               User Key     Initials Effective Dates Name Provider Type Discipline     06/16/21 -  Brittany Espinosa RN Registered Nurse Nurse    DAVID 06/03/21 -  Lester Donis, PT Physical Therapist PT              PT Recommendation and Plan     Progress Summary (PT)  Progress Toward Functional Goals (PT): progress toward functional goals is fair  Outcome Measures     Row Name 07/14/21 1200 07/13/21 1400          How much help from another person do you currently need...    Turning from your back to your side while in flat bed without using bedrails?  3  -RH  3  -DAVID     Moving from lying on back to sitting on the side of a flat bed without bedrails?  3  -RH  3  -DAVID     Moving to and from a bed to a chair (including a wheelchair)?  3  -RH  3  -DAVID     Standing up from a chair using your arms (e.g., wheelchair, bedside chair)?  3  -RH  3  -DAVID     Climbing 3-5 steps with a railing?  4  -RH  2  -DAVID     To walk in hospital room?  4  -RH  3  -DAVID     AM-PAC 6 Clicks Score (PT)  20  -RH  17  -DAVID        Functional Assessment    Outcome Measure Options  --  AM-PAC 6 Clicks Basic Mobility (PT)  -DAVID       User Key  (r) = Recorded By, (t) = Taken By, (c) = Cosigned By     Initials Name Provider Type    RH Crispin Jackman PTA Physical Therapy Assistant    Lester Cortez, PT Physical Therapist           Time Calculation:   PT Charges     Row Name 07/14/21 1446 07/14/21 1210          Time Calculation    PT Received On  07/14/21  -RH  07/14/21  -RH        Timed Charges    50474 - Gait Training Minutes   8  -RH  9  -RH     29263 - PT Therapeutic Activity Minutes  4  -RH  3  -RH        Untimed Charges    PT Group Therapy Minutes  25  -RH  30  -RH        Total Minutes    Timed Charges Total Minutes  12  -RH  12  -RH     Untimed Charges Total Minutes  25  -RH  30  -RH      Total Minutes  37  -RH  42  -RH       User Key  (r) = Recorded By, (t) = Taken By, (c) = Cosigned By    Initials Name Provider Type    RH Crispin Jackman PTA Physical Therapy Assistant        Therapy Charges for Today     Code Description Service Date Service Provider Modifiers Qty    39395355864 HC GAIT TRAINING EA 15 MIN 7/14/2021 Crispin Jackman PTA GP 1    40143031356 HC PT THER PROC GROUP 7/14/2021 Crispin Jackman PTA GP 1    11633575534 HC GAIT TRAINING EA 15 MIN 7/14/2021 Crispin Jackman PTA GP 1    60743310783 HC PT THER PROC GROUP 7/14/2021 Crispin Jackman PTA GP 1          PT G-Codes  Outcome Measure Options: AM-PAC 6 Clicks Daily Activity (OT), Optimal Instrument  AM-PAC 6 Clicks Score (PT): 20  AM-PAC 6 Clicks Score (OT): 22    Crispin Jackman PTA  7/14/2021

## 2021-07-14 NOTE — H&P
[facility]  Date[facility] 2021   Patient Name: Beth Mullins  : 1975  MRN: [id number]  Primary Care Physician:  Amber Lundberg APRN  Date of admission: 2021    Subjective   Subjective     Chief Complaint: Left knee pain    HPI:  Patient is a 45-year-old female past medical history significant for osteoarthritis who presents for scheduled Left TKA. Patient states that prior to the procedure the pain had gradually been worsening over the past several years. The patient reports pain and functional impairment including activities of daily living, they have moderate to severe resting pain, chronic inflammation, and swelling. The patient states that this pain is no longer relieved with conservative therapies including NSAIDs, injections, and physical therapy. The pain is dull and achy in nature, nonradiating, worse with activity. Because of the above the patient is admitted for an postoperative pain control, symptom management and rehab evaluation.      Personal History     Past Medical History:  Hypertension  Seasonal allergies  GERD  Vitamin D deficiency    Family History:   Reviewed noncontributory    Social History:   Current everyday smoker, occasional alcohol use    Home Medications:  HYDROcodone-acetaminophen, apixaban, aspirin EC, cetirizine, fluticasone, hydroCHLOROthiazide, lisinopril, nabumetone, sucralfate, and vitamin D    Allergies:  Allergies   Allergen Reactions   • Morphine Anaphylaxis       Review of Systems   10 point review of systems negative other than stated in HPI    Objective   Objective     Vitals:   Temp:  [97.3 °F (36.3 °C)-98.8 °F (37.1 °C)] 98.2 °F (36.8 °C)  Heart Rate:  [] 105  Resp:  [15-21] 20  BP: (104-164)/() 164/88  Flow (L/min):  [3-6] 3    Physical Exam    Constitutional: Awake, alert, no acute distress   Eyes: Pupils equal, sclerae anicteric, no conjunctival injection   HENT: NCAT, mucous membranes  moist   Neck: Supple, no thyromegaly, no lymphadenopathy, trachea midline   Respiratory: Clear to auscultation bilaterally, nonlabored respirations    Cardiovascular: RRR, no murmurs, rubs, or gallops   Gastrointestinal: Positive bowel sounds, soft, nontender, nondistended   Musculoskeletal: No bilateral ankle edema, no clubbing or cyanosis to extremities, postsurgical changes of the left knee noted, dressing clean dry and intact   Psychiatric: Appropriate affect, cooperative   Neurologic: Oriented x 3, strength symmetric in all extremities, Cranial Nerves grossly intact to confrontation, speech clear   Skin: No rashes     Result Review:  I have personally reviewed the results from the time of this admission to 7/13/2021 20:38 EDT and agree with these findings:  [x]  Laboratory  [x]  Microbiology  [x]  Radiology  [x]  EKG/Telemetry   []  Cardiology/Vascular   []  Pathology  []  Old records  []  Other:      Assessment/Plan   Assessment / Plan     Assessment:   Left total knee arthroplasty  Hypertension  Nicotine dependence  GERD  Vitamin D deficiency  Seasonal allergies    Plan:  • Patient is admitted to the hospital for further workup and management of above  • Patient status post left total knee arthroplasty without any immediate postoperative complications  • Postoperative pain control with IV and p.o. narcotics  • Patient will be started on anticoagulation postop day 1  • Zofran if needed for nausea  • Hold home antihypertensives, resume as needed  • Check CBC in a.m.  • Resume appropriate home medications  • PT/OT/social work  • Clinical course will dictate further management    Reviewed patients labs and imaging, and discussed with patient and nurse at bedside, will discuss with orthopedic surgery    CODE STATUS:         Admission Status:  I believe this patient meets observation status.      Electronically signed by Don Umaña MD, 07/13/21, 8:38 PM EDT.

## 2021-07-14 NOTE — THERAPY EVALUATION
Patient Name: Beth Mullins  : 1975    MRN: [id number]                              Today's Date: 2021       Admit Date: 2021    Visit Dx:     ICD-10-CM ICD-9-CM   1. Difficulty in walking  R26.2 719.7   2. Pre-op testing  Z01.818 V72.84   3. Primary osteoarthritis of left knee  M17.12 715.16   4. Decreased activities of daily living (ADL)  Z78.9 V49.89     Patient Active Problem List   Diagnosis   • Osteoarthritis of left knee     Past Medical History:   Diagnosis Date   • Anemia    • Arthritis    • Febrile seizure (CMS/HCC)     when 3 yr old   • Gastric ulcer    • Hypertension    • Seasonal allergies      Past Surgical History:   Procedure Laterality Date   • ABDOMINAL SURGERY     •  SECTION     • CLAVICLE SURGERY     • CYSTOSCOPY BLADDER STONE LITHOTRIPSY     • HYSTERECTOMY       General Information     [facility] Name 21 1105          OT Time and Intention    Document Type  evaluation  -     Mode of Treatment  individual therapy;occupational therapy  -     Row Name 21 1105          General Information    Patient Profile Reviewed  yes  -     Prior Level of Function  independent:;all household mobility;community mobility;ADL's;home management;driving recently utilized cane as needed for functional mobility  -     Existing Precautions/Restrictions  no known precautions/restrictions  -     Barriers to Rehab  none identified  -[facility] Name 21 1105          Occupational Profile    Reason for Services/Referral (Occupational Profile)  Patient underwent left total knee replacement on 2021. She is currently post-op day 1 and on 2 North. Patient was referred for OT evaluation due to functional status change with ADLs, functional mobility, and/or transfers. No prior services for the current condition.  -     Row Name 21 1105          Living Environment    Lives With  spouse teenage child in the home, daughter whom is 23 lives nearby  -[facility] Name 21  1105          Cognition    Orientation Status (Cognition)  oriented x 4  -[facility] Name 07/14/21 1105          Safety Issues, Functional Mobility    Impairments Affecting Function (Mobility)  balance;range of motion (ROM);pain  -       User Key  (r) = Recorded By, (t) = Taken By, (c) = Cosigned By    Initials Name Provider Type     Venus Sanchze, OT Occupational Therapist          Mobility/ADL's     [facility] Name 07/14/21 1107          Transfers    Transfers  sit-stand transfer;toilet transfer  -     Sit-Stand [place] (Transfers)  standby assist  -     [place] Level (Toilet Transfer)  standby assist  -     Assistive Device (Toilet Transfer)  walker, front-wheeled;commode, 3-in-1 over standard commode  -[facility] Name 07/14/21 1107          Sit-Stand Transfer    Assistive Device (Sit-Stand Transfers)  walker, front-wheeled  -KP     Row Name 07/14/21 1107          Toilet Transfer    Type (Toilet Transfer)  stand-sit;sit-stand;stand pivot/stand step  -KP     Row Name 07/14/21 1107          Functional Mobility    Functional Mobility- Ind. Level  standby assist  -     Functional Mobility- Device  rolling walker  -     Functional Mobility-Distance (Feet)  20  -[facility] Name 07/14/21 1107          Activities of Daily Living    BADL Assessment/Intervention  bathing;upper body dressing;lower body dressing  -KP     Row Name 07/14/21 1107          Mobility    Extremity Weight-bearing Status  left lower extremity  -     Left Lower Extremity (Weight-bearing Status)  weight-bearing as tolerated (WBAT)  -KP     Row Name 07/14/21 1107          Bathing Assessment/Intervention    [place] Level (Bathing)  bathing skills;upper body;set up;lower body;minimum assist (75% patient effort)  -     Position (Bathing)  supported sitting;supported standing  -[facility] Name 07/14/21 1107          Upper Body Dressing Assessment/Training    [place] Level (Upper Body Dressing)  upper body dressing  skills;doff;don;bra/undergarment;pull-over garment;set up  -     Position (Upper Body Dressing)  unsupported sitting  -KP     Row Name 07/14/21 1107          Lower Body Dressing Assessment/Training    [place] Level (Lower Body Dressing)  lower body dressing skills;doff;don;pants/bottoms;shoes/slippers;socks;minimum assist (75% patient effort)  -     Position (Lower Body Dressing)  supported standing;unsupported sitting  -       User Key  (r) = Recorded By, (t) = Taken By, (c) = Cosigned By    Initials Name Provider Type    Venus Felder OT Occupational Therapist        Obj/Interventions     Row Name 07/14/21 1109          Sensory Assessment (Somatosensory)    Sensory Assessment (Somatosensory)  UE sensation intact  -KP     Row Name 07/14/21 1109          Vision Assessment/Intervention    Visual Impairment/Limitations  WNL  -KP     Row Name 07/14/21 1109          Range of Motion Comprehensive    General Range of Motion  bilateral upper extremity ROM WNL  -KP     Row Name 07/14/21 1109          Strength Comprehensive (MMT)    Comment, General Manual Muscle Testing (MMT) Assessment  5/5 MMT in bilateral upper extremities  -KP     Row Name 07/14/21 1109          Motor Skills    Motor Skills  coordination;functional endurance  -     Coordination  WNL  -     Functional Endurance  fair plus/good, room air  -KP     Row Name 07/14/21 1109          Balance    Balance Assessment  standing dynamic balance  -     Dynamic Standing Balance  WFL  -       User Key  (r) = Recorded By, (t) = Taken By, (c) = Cosigned By    Initials Name Provider Type    Venus Felder OT Occupational Therapist        Goals/Plan    No documentation.       Clinical Impression     Row Name 07/14/21 1109          Pain Assessment    Additional Documentation  Pain Scale: FACES Pre/Post-Treatment (Group)  -KP     Row Name 07/14/21 1109          Pain Scale: FACES Pre/Post-Treatment    Pain: FACES Scale, Pretreatment  0-->no hurt   -     Posttreatment Pain Rating  0-->no hurt  -     Row Name 07/14/21 1109          Plan of Care Review    Plan of Care Reviewed With  patient  -     Progress  no change  -     Outcome Summary  Patient presents with no significant functional limitations impacting return home with assist from family as needed. She was receptive to education, and no unsafe acts demonstrated. Patient reports no concerns with returning home and plans to discharge home later on this date. No further skilled OT services, patient will be discharged from caseload.  -     Row Name 07/14/21 1109          Therapy Assessment/Plan (OT)    Patient/Family Therapy Goal Statement (OT)  Patient wants to be able to do yardwork.  -     Criteria for Skilled Therapeutic Interventions Met (OT)  no;no problems identified which require skilled intervention  -     Row Name 07/14/21 1109          Therapy Plan Review/Discharge Plan (OT)    Equipment Needs Upon Discharge (OT)  walker, rolling has 3-n-1 BSC at home, reports she plans to purchase elevated commode seat  -     Anticipated Discharge Disposition (OT)  home with assist;home with outpatient therapy services  -       User Key  (r) = Recorded By, (t) = Taken By, (c) = Cosigned By    Initials Name Provider Type    Venus Felder, OT Occupational Therapist        Outcome Measures     Row Name 07/14/21 1111          How much help from another is currently needed...    Putting on and taking off regular lower body clothing?  3  -KP     Bathing (including washing, rinsing, and drying)  3  -KP     Toileting (which includes using toilet bed pan or urinal)  4  -KP     Putting on and taking off regular upper body clothing  4  -KP     Taking care of personal grooming (such as brushing teeth)  4  -KP     Eating meals  4  -     AM-PAC 6 Clicks Score (OT)  22  -     Row Name 07/14/21 0800          How much help from another person do you currently need...    Turning from your back to your side  while in flat bed without using bedrails?  3  -BB     Moving from lying on back to sitting on the side of a flat bed without bedrails?  3  -BB     Moving to and from a bed to a chair (including a wheelchair)?  3  -BB     Standing up from a chair using your arms (e.g., wheelchair, bedside chair)?  3  -BB     Climbing 3-5 steps with a railing?  2  -BB     To walk in hospital room?  3  -BB     AM-PAC 6 Clicks Score (PT)  17  -BB     Row Name 07/14/21 1111          Functional Assessment    Outcome Measure Options  AM-PAC 6 Clicks Daily Activity (OT);Optimal Instrument  -KP     Row Name 07/14/21 1111          Optimal Instrument    Optimal Instrument  Optimal - 3  -KP     Bending/Stooping  2  -KP     Standing  1  -KP     Reaching  1  -KP     From the list, choose the 3 activities you would most like to be able to do without any difficulty  Standing;Bending/stooping;Reaching  -KP     Total Score Optimal - 3  4  -KP       User Key  (r) = Recorded By, (t) = Taken By, (c) = Cosigned By    Initials Name Provider Type    Brittany Sutton, RN Registered Nurse    Venus Felder OT Occupational Therapist        Occupational Therapy Education                 Title: PT OT SLP Therapies (Done)     Topic: Occupational Therapy (Done)     Point: ADL training (Done)     Description:   Instruct learner(s) on proper safety adaptation and remediation techniques during self care or transfers.   Instruct in proper use of assistive devices.              Learning Progress Summary           Patient Acceptance, E,D, VU,DU by BRIDGER at 7/14/2021 1112    Comment: ADL home safefty, transfer technique, adaptive lower body dressing/bathing technique, joint protection, weightbearing status                   Point: Home exercise program (Done)     Description:   Instruct learner(s) on appropriate technique for monitoring, assisting and/or progressing therapeutic exercises/activities.              Learning Progress Summary           Patient  Acceptance, E,D, VU,DU by  at 7/14/2021 1112    Comment: ADL home safefty, transfer technique, adaptive lower body dressing/bathing technique, joint protection, weightbearing status                   Point: Precautions (Done)     Description:   Instruct learner(s) on prescribed precautions during self-care and functional transfers.              Learning Progress Summary           Patient Acceptance, E,D, VU,DU by  at 7/14/2021 1112    Comment: ADL home safefty, transfer technique, adaptive lower body dressing/bathing technique, joint protection, weightbearing status                   Point: Body mechanics (Done)     Description:   Instruct learner(s) on proper positioning and spine alignment during self-care, functional mobility activities and/or exercises.              Learning Progress Summary           Patient Acceptance, E,D, VU,DU by  at 7/14/2021 1112    Comment: ADL home safefty, transfer technique, adaptive lower body dressing/bathing technique, joint protection, weightbearing status                               User Key     Initials Effective Dates Name Provider Type Discipline     06/16/21 -  Venus Sanchez OT Occupational Therapist OT              OT Recommendation and Plan     Plan of Care Review  Plan of Care Reviewed With: patient  Progress: no change  Outcome Summary: Patient presents with no significant functional limitations impacting return home with assist from family as needed. She was receptive to education, and no unsafe acts demonstrated. Patient reports no concerns with returning home and plans to discharge home later on this date. No further skilled OT services, patient will be discharged from caseload.     Time Calculation:   Time Calculation- OT     Row Name 07/14/21 1113             Time Calculation- OT    OT Received On  07/14/21  -         Timed Charges    27747 - OT Therapeutic Activity Minutes  8  -      21014 - OT Self Care/Mgmt Minutes  15  -         Untimed Charges     OT Eval/Re-eval Minutes  33  -KP         Total Minutes    Timed Charges Total Minutes  23  -KP      Untimed Charges Total Minutes  33  -KP       Total Minutes  56  -KP        User Key  (r) = Recorded By, (t) = Taken By, (c) = Cosigned By    Initials Name Provider Type    Venus Felder OT Occupational Therapist        Therapy Charges for Today     Code Description Service Date Service Provider Modifiers Qty    86763060490 HC OT THERAPEUTIC ACT EA 15 MIN 7/14/2021 Venus Sanchez OT GO 1    26148455042 HC OT SELF CARE/MGMT/TRAIN EA 15 MIN 7/14/2021 Venus Sanchez OT GO 1    60205458055 HC OT EVAL LOW COMPLEXITY 3 7/14/2021 Venus Sanchez OT GO 1               Venus Sanchez OT  7/14/2021

## 2021-07-14 NOTE — PLAN OF CARE
Goal Outcome Evaluation:  Plan of Care Reviewed With: patient        Progress: no change  Outcome Summary: Patient presents with no significant functional limitations impacting return home with assist from family as needed. She was receptive to education, and no unsafe acts demonstrated. Patient reports no concerns with returning home and plans to discharge home later on this date. No further skilled OT services, patient will be discharged from caseload.

## 2021-07-14 NOTE — NURSING NOTE
Patient had small area of swelling and bruising under the Aquacel and not related to the incision. Dr Been notified and stated that the patient could still discharge, make sure we are applying ice to the area. After second therapy patient asked RN to reassess the area due to increase in swelling. Dr Been notified of the increase in swelling and bruising.

## 2021-07-14 NOTE — PROGRESS NOTES
"    Orthopedic Total Joint Progress Note        Patient: Beth Mullins    Date of Admission: 7/13/2021  8:20 AM    YOB: 1975    Medical Record Number: [id number]    Attending Physician: Don Umaña MD      POD # 1 Day Post-Op Procedure(s) (LRB):  LEFT TOTAL KNEE ARTHROPLASTY WITH COMPUTER NAVIGATION (Left)       Systemic or Specific Complaints: The patient has had a relatively normal postoperative course.  The patient has had no current complaints. The patient has had improving normal postoperative pain.  The patient has had no issues with the wound. The patient's reports No Complaints.      Allergies:   Allergies   Allergen Reactions   • Morphine Anaphylaxis       Medications:   Current Medications:  Scheduled Meds:apixaban, 2.5 mg, Oral, Q12H  sodium chloride, 3 mL, Intravenous, Q12H      Continuous Infusions:lactated ringers, 80 mL/hr, Last Rate: 80 mL/hr (07/13/21 1425)  Scopolamine, 1 patch      PRN Meds:.bisacodyl  •  bisacodyl  •  docusate sodium  •  HYDROcodone-acetaminophen  •  HYDROcodone-acetaminophen  •  HYDROmorphone  •  ketorolac  •  magnesium hydroxide  •  ondansetron **OR** ondansetron  •  sodium chloride      Physical Exam: 45 y.o. female   Wt Readings from Last 3 Encounters:   07/13/21 102 kg (225 lb 15.5 oz)   06/29/21 103 kg (226 lb 3.1 oz)   06/06/21 102 kg (224 lb 10.4 oz)     Ht Readings from Last 3 Encounters:   07/13/21 152.4 cm (60\")   06/29/21 152.4 cm (60\")   06/06/21 149.9 cm (59\")     Body mass index is 44.13 kg/m².    Vitals:    07/13/21 1925 07/13/21 2235 07/14/21 0500 07/14/21 0535   BP: 164/88 160/91  159/80   BP Location:       Patient Position:       Pulse: 105 94  87   Resp: 20 20  20   Temp: 98.2 °F (36.8 °C) 98.8 °F (37.1 °C)  98.4 °F (36.9 °C)   TempSrc:       SpO2: (!) 85% 96% 93% 95%   Weight:       Height:            General Appearance:    General: alert and oriented         Abdomen/:     soft non-tender, non-distended, voiding without difficulty "       Extremities:   Operative extremity neurovascular status intact. ROM appropriate.  Incision intact w/out signs or symptoms of infection.  No cyanosis, calf is soft and nontender.     Activity: Mobilizing Per P.T.   Weight Bearing: As Tolerated    Diagnostic Tests:   Admission on 07/13/2021   Component Date Value Ref Range Status   • Magnesium 07/14/2021 1.9  1.6 - 2.6 mg/dL Final   • WBC 07/14/2021 19.95* 3.40 - 10.80 10*3/mm3 Final   • RBC 07/14/2021 3.32* 3.77 - 5.28 10*6/mm3 Final   • Hemoglobin 07/14/2021 10.6* 12.0 - 15.9 g/dL Final   • Hematocrit 07/14/2021 31.4* 34.0 - 46.6 % Final   • MCV 07/14/2021 94.6  79.0 - 97.0 fL Final   • MCH 07/14/2021 31.9  26.6 - 33.0 pg Final   • MCHC 07/14/2021 33.8  31.5 - 35.7 g/dL Final   • RDW 07/14/2021 12.1* 12.3 - 15.4 % Final   • RDW-SD 07/14/2021 41.9  37.0 - 54.0 fl Final   • MPV 07/14/2021 9.3  6.0 - 12.0 fL Final   • Platelets 07/14/2021 376  140 - 450 10*3/mm3 Final   • Neutrophil % 07/14/2021 87.1* 42.7 - 76.0 % Final   • Lymphocyte % 07/14/2021 7.0* 19.6 - 45.3 % Final   • Monocyte % 07/14/2021 5.4  5.0 - 12.0 % Final   • Eosinophil % 07/14/2021 0.0* 0.3 - 6.2 % Final   • Basophil % 07/14/2021 0.1  0.0 - 1.5 % Final   • Immature Grans % 07/14/2021 0.4  0.0 - 0.5 % Final   • Neutrophils, Absolute 07/14/2021 17.38* 1.70 - 7.00 10*3/mm3 Final   • Lymphocytes, Absolute 07/14/2021 1.40  0.70 - 3.10 10*3/mm3 Final   • Monocytes, Absolute 07/14/2021 1.07* 0.10 - 0.90 10*3/mm3 Final   • Eosinophils, Absolute 07/14/2021 0.00  0.00 - 0.40 10*3/mm3 Final   • Basophils, Absolute 07/14/2021 0.02  0.00 - 0.20 10*3/mm3 Final   • Immature Grans, Absolute 07/14/2021 0.08* 0.00 - 0.05 10*3/mm3 Final   • nRBC 07/14/2021 0.0  0.0 - 0.2 /100 WBC Final       Imaging Results (Last 72 Hours)     Procedure Component Value Units Date/Time    XR Knee 1 or 2 View Left [[id number]] Collected: 07/13/21 1259     Updated: 07/13/21 1303    Narrative:      PROCEDURE: XR KNEE 1 OR 2 VW LEFT      COMPARISON: None     INDICATIONS: post op total knee left     FINDINGS:   There are postoperative changes from left total knee arthroplasty.  The components are normally   aligned.  Fluid is seen in the suprapatella bursa consistent with recent surgery.     CONCLUSION: Normal postoperative change from left total knee arthroplasty.               JEREMIAH CHU MD         Electronically Signed and Approved By: JEREMIAH CHU MD on 7/13/2021 at 12:59                           Personally viewed ortho images and report     Assessment:  Doing well 1 Day Post-Op following total joint replacement  Acute Blood Loss Anemia, stable  Post-operative Pain  Limited mobility, requires use of walker and assistance when OOB.    Patient Active Problem List   Diagnosis   • Osteoarthritis of left knee        Plan:    Consults: none  Continue to monitor labs and/or v/s, for tolerance to post op blood loss.  Continue efforts to increase mobilization.  Continue Pain Control Measures.  Continue incisional Care.  DVT prophylaxis.  Follow up in office with Cash Guan M.D. In 2 weeks.    Discharge Plan:today to home, home health and when cleared by physical therapy as safe for discharge    Date: 7/14/2021  Don Acuna MD

## 2021-07-14 NOTE — NURSING NOTE
DR BEEN NOTIFIED RN TO GO AHEAD AND KEEP THE PATIENT ANOTHER NIGHT. STOP ELIQUIS AND BEGIN ASPIRIN 325 Q DAY.

## 2021-07-14 NOTE — SIGNIFICANT NOTE
07/14/21 1123   Plan   Final Discharge Disposition Code 01 - home or self-care   Final Note [facility] PT in [place], Appt:7/15/21 at 1:30pm

## 2021-07-14 NOTE — PLAN OF CARE
Goal Outcome Evaluation:      Pain well controlled. Vital signs stable. Discharging today. Ambulating well with therapy

## 2021-07-14 NOTE — PLAN OF CARE
Goal Outcome Evaluation:  Plan of Care Reviewed With: patient        Progress: improving  Outcome Summary: Patient rested well this shift. Pain managed well with PRN meds. V/S stable. Ambulating well.

## 2021-07-15 VITALS — BODY MASS INDEX: 43.78 KG/M2 | HEIGHT: 60 IN | WEIGHT: 223 LBS

## 2021-07-15 VITALS
OXYGEN SATURATION: 100 % | WEIGHT: 225.97 LBS | BODY MASS INDEX: 44.36 KG/M2 | HEART RATE: 92 BPM | TEMPERATURE: 98.6 F | RESPIRATION RATE: 17 BRPM | SYSTOLIC BLOOD PRESSURE: 149 MMHG | DIASTOLIC BLOOD PRESSURE: 91 MMHG | HEIGHT: 60 IN

## 2021-07-15 LAB
ALBUMIN SERPL-MCNC: 3.4 G/DL (ref 3.5–5.2)
ALBUMIN/GLOB SERPL: 1.5 G/DL
ALP SERPL-CCNC: 66 U/L (ref 39–117)
ALT SERPL W P-5'-P-CCNC: 11 U/L (ref 1–33)
ANION GAP SERPL CALCULATED.3IONS-SCNC: 7.9 MMOL/L (ref 5–15)
AST SERPL-CCNC: 18 U/L (ref 1–32)
BASOPHILS # BLD AUTO: 0.05 10*3/MM3 (ref 0–0.2)
BASOPHILS NFR BLD AUTO: 0.4 % (ref 0–1.5)
BILIRUB SERPL-MCNC: <0.2 MG/DL (ref 0–1.2)
BUN SERPL-MCNC: 14 MG/DL (ref 6–20)
BUN/CREAT SERPL: 18.9 (ref 7–25)
CALCIUM SPEC-SCNC: 8.3 MG/DL (ref 8.6–10.5)
CHLORIDE SERPL-SCNC: 107 MMOL/L (ref 98–107)
CO2 SERPL-SCNC: 22.1 MMOL/L (ref 22–29)
CREAT SERPL-MCNC: 0.74 MG/DL (ref 0.57–1)
DEPRECATED RDW RBC AUTO: 43.1 FL (ref 37–54)
EOSINOPHIL # BLD AUTO: 0.08 10*3/MM3 (ref 0–0.4)
EOSINOPHIL NFR BLD AUTO: 0.6 % (ref 0.3–6.2)
ERYTHROCYTE [DISTWIDTH] IN BLOOD BY AUTOMATED COUNT: 12.4 % (ref 12.3–15.4)
GFR SERPL CREATININE-BSD FRML MDRD: 86 ML/MIN/1.73
GLOBULIN UR ELPH-MCNC: 2.2 GM/DL
GLUCOSE SERPL-MCNC: 108 MG/DL (ref 65–99)
HCT VFR BLD AUTO: 28.4 % (ref 34–46.6)
HGB BLD-MCNC: 9.4 G/DL (ref 12–15.9)
IMM GRANULOCYTES # BLD AUTO: 0.07 10*3/MM3 (ref 0–0.05)
IMM GRANULOCYTES NFR BLD AUTO: 0.5 % (ref 0–0.5)
LYMPHOCYTES # BLD AUTO: 3.79 10*3/MM3 (ref 0.7–3.1)
LYMPHOCYTES NFR BLD AUTO: 28.9 % (ref 19.6–45.3)
MAGNESIUM SERPL-MCNC: 1.9 MG/DL (ref 1.6–2.6)
MCH RBC QN AUTO: 31.8 PG (ref 26.6–33)
MCHC RBC AUTO-ENTMCNC: 33.1 G/DL (ref 31.5–35.7)
MCV RBC AUTO: 95.9 FL (ref 79–97)
MONOCYTES # BLD AUTO: 0.96 10*3/MM3 (ref 0.1–0.9)
MONOCYTES NFR BLD AUTO: 7.3 % (ref 5–12)
NEUTROPHILS NFR BLD AUTO: 62.3 % (ref 42.7–76)
NEUTROPHILS NFR BLD AUTO: 8.18 10*3/MM3 (ref 1.7–7)
NRBC BLD AUTO-RTO: 0 /100 WBC (ref 0–0.2)
PHOSPHATE SERPL-MCNC: 3.2 MG/DL (ref 2.5–4.5)
PLATELET # BLD AUTO: 295 10*3/MM3 (ref 140–450)
PMV BLD AUTO: 8.9 FL (ref 6–12)
POTASSIUM SERPL-SCNC: 3.9 MMOL/L (ref 3.5–5.2)
PROT SERPL-MCNC: 5.6 G/DL (ref 6–8.5)
RBC # BLD AUTO: 2.96 10*6/MM3 (ref 3.77–5.28)
SODIUM SERPL-SCNC: 137 MMOL/L (ref 136–145)
WBC # BLD AUTO: 13.13 10*3/MM3 (ref 3.4–10.8)

## 2021-07-15 PROCEDURE — 83735 ASSAY OF MAGNESIUM: CPT | Performed by: INTERNAL MEDICINE

## 2021-07-15 PROCEDURE — 80053 COMPREHEN METABOLIC PANEL: CPT | Performed by: INTERNAL MEDICINE

## 2021-07-15 PROCEDURE — 94799 UNLISTED PULMONARY SVC/PX: CPT

## 2021-07-15 PROCEDURE — 84100 ASSAY OF PHOSPHORUS: CPT | Performed by: INTERNAL MEDICINE

## 2021-07-15 PROCEDURE — 85025 COMPLETE CBC W/AUTO DIFF WBC: CPT | Performed by: INTERNAL MEDICINE

## 2021-07-15 PROCEDURE — 97116 GAIT TRAINING THERAPY: CPT

## 2021-07-15 PROCEDURE — 99217 PR OBSERVATION CARE DISCHARGE MANAGEMENT: CPT | Performed by: INTERNAL MEDICINE

## 2021-07-15 PROCEDURE — 25010000002 KETOROLAC TROMETHAMINE PER 15 MG: Performed by: ORTHOPAEDIC SURGERY

## 2021-07-15 PROCEDURE — 25010000002 HYDROMORPHONE 1 MG/ML SOLUTION: Performed by: ORTHOPAEDIC SURGERY

## 2021-07-15 PROCEDURE — 97150 GROUP THERAPEUTIC PROCEDURES: CPT

## 2021-07-15 RX ADMIN — HYDROCODONE BITARTRATE AND ACETAMINOPHEN 2 TABLET: 7.5; 325 TABLET ORAL at 08:21

## 2021-07-15 RX ADMIN — SODIUM CHLORIDE, PRESERVATIVE FREE 3 ML: 5 INJECTION INTRAVENOUS at 08:22

## 2021-07-15 RX ADMIN — HYDROCODONE BITARTRATE AND ACETAMINOPHEN 2 TABLET: 7.5; 325 TABLET ORAL at 17:10

## 2021-07-15 RX ADMIN — KETOROLAC TROMETHAMINE 15 MG: 15 INJECTION, SOLUTION INTRAMUSCULAR; INTRAVENOUS at 10:59

## 2021-07-15 RX ADMIN — HYDROMORPHONE HYDROCHLORIDE 0.5 MG: 1 INJECTION, SOLUTION INTRAMUSCULAR; INTRAVENOUS; SUBCUTANEOUS at 11:56

## 2021-07-15 RX ADMIN — HYDROCODONE BITARTRATE AND ACETAMINOPHEN 2 TABLET: 7.5; 325 TABLET ORAL at 02:42

## 2021-07-15 RX ADMIN — HYDROCODONE BITARTRATE AND ACETAMINOPHEN 2 TABLET: 7.5; 325 TABLET ORAL at 12:55

## 2021-07-15 RX ADMIN — CETIRIZINE HYDROCHLORIDE 10 MG: 10 TABLET, FILM COATED ORAL at 08:21

## 2021-07-15 RX ADMIN — ASPIRIN 325 MG: 325 TABLET, COATED ORAL at 08:21

## 2021-07-15 NOTE — DISCHARGE INSTRUCTIONS
REMOVE DRESSING ON 7/16. CLEAN AREA WITH ALCOHOL, APPLY NEW AQUACELL. LEAVE IN PLACE TILL 7/19.  REMOVE AQUACELL IN 7/19. BEGIN DAILY DRESSING CHANGES. CLEAN AREA WITH ALCOHOL SWAB, APPLY ABD PAD AND TAPE.    MAY SHOWER ON 7/17. AQUACELL IS WATERPROOF

## 2021-07-15 NOTE — THERAPY TREATMENT NOTE
Acute Care - Physical Therapy Treatment Note  [facility]     Patient Name: Beth Mullins  : 1975  MRN: [id number]  Today's Date: 7/15/2021   Onset of Illness/Injury or Date of Surgery: 21  Gait- individual; ther- ex -group setting;5 participants        PT Assessment (last 12 hours)      PT Evaluation and Treatment     Row Name 07/15/21 1444 07/15/21 1200       Physical Therapy Time and Intention    Subjective Information  complains of;weakness;swelling;pain  -  complains of;pain;swelling  -RH    Document Type  therapy note (daily note)  -  therapy note (daily note)  -    Mode of Treatment  occupational therapy;group therapy;individual therapy  -  physical therapy;group therapy;individual therapy  -    Patient Effort  fair  -RH  fair  -    Row Name 07/15/21 1200          Pain Scale: Word Pre/Post-Treatment    Posttreatment Pain Rating  10 - excruciating pain  -     Pain Location - Side  Left  -RH     Pain Location  knee  -     Pain Intervention(s)  Emotional support;Nursing Notified  -     Row Name 07/15/21 1444          Pain Scale: FACES Pre/Post-Treatment    Posttreatment Pain Rating  2-->hurts little bit  -     Row Name 07/15/21 1444          Bed Mobility    Bed Mobility  sit-supine  -     All Activities, [place] (Bed Mobility)  contact guard assist  -     Row Name 07/15/21 1444 07/15/21 1200       Transfers    Transfers  sit-stand transfer;stand-sit transfer  -  sit-stand transfer;stand-sit transfer  -    Sit-Stand [place] (Transfers)  contact guard  -RH  contact guard  -    Stand-Sit [place] (Transfers)  contact guard  -RH  contact guard  -RH    Row Name 07/15/21 1444 07/15/21 1200       Sit-Stand Transfer    Assistive Device (Sit-Stand Transfers)  walker, front-wheeled  -  walker, front-wheeled  -    Row Name 07/15/21 1444 07/15/21 1200       Stand-Sit Transfer    Assistive Device (Stand-Sit Transfers)  walker, front-wheeled  -  walker,  front-wheeled  -RH    Row Name 07/15/21 1444 07/15/21 1200       Gait/Stairs (Locomotion)    Gait/Stairs Locomotion  gait/ambulation independence;gait/ambulation assistive device;distance ambulated  -RH  gait/ambulation independence;gait/ambulation assistive device;distance ambulated  -RH    [place] Level (Gait)  contact guard  -RH  contact guard  -RH    Assistive Device (Gait)  walker, front-wheeled  -RH  walker, front-wheeled  -RH    Distance in Feet (Gait)  60  -RH  50  -RH    Row Name             Wound 07/13/21 1146 Left anterior knee Incision    Wound - Properties Group Placement Date: 07/13/21  -SC Placement Time: 1146  -SC Side: Left  -SC Orientation: anterior  -SC Location: knee  -SC Primary Wound Type: Incision  -SC    Retired Wound - Properties Group Date first assessed: 07/13/21  -SC Time first assessed: 1146  -SC Side: Left  -SC Location: knee  -SC Primary Wound Type: Incision  -SC    Row Name 07/15/21 1444 07/15/21 1200       Progress Summary (PT)    Progress Toward Functional Goals (PT)  progress toward functional goals is fair  -RH  progress toward functional goals is fair  -RH      User Key  (r) = Recorded By, (t) = Taken By, (c) = Cosigned By    Initials Name Provider Type    Kayla Paz, RN Registered Nurse    RH Crispin Jackman PTA Physical Therapy Assistant       Left Knee Ther-ex   Exercise  Reps  Sets    Long arc Quads   10 2   Short arc Quads   10 2   Heel Slides  10 2   Ankle Pumps  10 2   Quad sets  10 2   Glut sets  10 2   Straight leg raise  10 2          Physical Therapy Education                 Title: PT OT SLP Therapies (Done)     Topic: Physical Therapy (Done)     Point: Mobility training (Done)     Learning Progress Summary           Patient Acceptance, E, VU by RK at 7/15/2021 0320    Acceptance, E,TB, VU,DU by BB at 7/14/2021 1255    Acceptance, E,TB, VU by DAVID at 7/13/2021 1439                   Point: Home exercise program (Done)     Learning Progress Summary            Patient Acceptance, E, VU by RK at 7/15/2021 0320    Acceptance, E,TB, VU,DU by BB at 7/14/2021 1255    Acceptance, E,TB, VU by DAVID at 7/13/2021 1439                   Point: Body mechanics (Done)     Learning Progress Summary           Patient Acceptance, E, VU by RK at 7/15/2021 0320    Acceptance, E,TB, VU,DU by BB at 7/14/2021 1255    Acceptance, E,TB, VU by DAVID at 7/13/2021 1439                   Point: Precautions (Done)     Learning Progress Summary           Patient Acceptance, E, VU by RK at 7/15/2021 0320    Acceptance, E,TB, VU,DU by YAW at 7/14/2021 1255    Acceptance, E,TB, VU by DAVID at 7/13/2021 1439                               User Key     Initials Effective Dates Name Provider Type Discipline     06/16/21 -  Brittany Espinosa RN Registered Nurse Nurse    EVARISTO 06/16/21 -  Hue Salazar RN Registered Nurse Nurse    DAVID 06/03/21 -  Lester Donis, PT Physical Therapist PT              PT Recommendation and Plan     Progress Summary (PT)  Progress Toward Functional Goals (PT): progress toward functional goals is fair  Outcome Measures     Row Name 07/15/21 1200 07/14/21 1200 07/13/21 1400       How much help from another person do you currently need...    Turning from your back to your side while in flat bed without using bedrails?  3  -RH  3  -RH  3  -DAVID    Moving from lying on back to sitting on the side of a flat bed without bedrails?  3  -RH  3  -RH  3  -DAVID    Moving to and from a bed to a chair (including a wheelchair)?  3  -RH  3  -RH  3  -DAVID    Standing up from a chair using your arms (e.g., wheelchair, bedside chair)?  3  -RH  3  -RH  3  -DAVID    Climbing 3-5 steps with a railing?  3  -RH  4  -RH  2  -DAVID    To walk in hospital room?  3  -RH  4  -RH  3  -DAVID    AM-PAC 6 Clicks Score (PT)  18  -RH  20  -RH  17  -DAVID       Functional Assessment    Outcome Measure Options  --  --  AM-PAC 6 Clicks Basic Mobility (PT)  -DAVID      User Key  (r) = Recorded By, (t) = Taken By, (c) = Cosigned By    Initials  Name Provider Type    RH Crispin Jackman PTA Physical Therapy Assistant    Lester Cortez, PT Physical Therapist           Time Calculation:   PT Charges     Row Name 07/15/21 1442 07/15/21 1204          Time Calculation    PT Received On  07/15/21  -RH  07/15/21  -RH        Timed Charges    13098 - Gait Training Minutes   8  -RH  8  -RH     65890 - PT Therapeutic Activity Minutes  4  -RH  4  -RH        Untimed Charges    PT Group Therapy Minutes  25  -RH  --        Total Minutes    Timed Charges Total Minutes  12  -RH  12  -RH     Untimed Charges Total Minutes  25  -RH  --      Total Minutes  37  -RH  12  -RH       User Key  (r) = Recorded By, (t) = Taken By, (c) = Cosigned By    Initials Name Provider Type    JHONNY Crispin Jackman PTA Physical Therapy Assistant        Therapy Charges for Today     Code Description Service Date Service Provider Modifiers Qty    61373684930 HC GAIT TRAINING EA 15 MIN 7/14/2021 Crispin Jackman, PTA GP 1    40288398112 HC PT THER PROC GROUP 7/14/2021 Crispin Jackman, PTA GP 1    94080824570 HC GAIT TRAINING EA 15 MIN 7/14/2021 Crispin Jackman, PTA GP 1    13890708972 HC PT THER PROC GROUP 7/14/2021 Crispin Jackman, PTA GP 1    47093832085 HC GAIT TRAINING EA 15 MIN 7/15/2021 Crispin Jackman, PTA GP 1    25849339760 HC PT THER PROC GROUP 7/15/2021 Crispin Jackman, PTA GP 1    21518528440 HC GAIT TRAINING EA 15 MIN 7/15/2021 Crispin Jackman, PTA GP 1    97222282279 HC PT THER PROC GROUP 7/15/2021 Crispin Jackman, PTA GP 1          PT G-Codes  Outcome Measure Options: AM-PAC 6 Clicks Daily Activity (OT), Optimal Instrument  AM-PAC 6 Clicks Score (PT): 18  AM-PAC 6 Clicks Score (OT): 22    Crispin Jackman PTA  7/15/2021

## 2021-07-15 NOTE — SIGNIFICANT NOTE
07/15/21 0910   Plan   Plan Comments Patient outpatient therapy is scheduled for tomorrow   Final Note  PT in [place], Appt: 7/16/21 at 10am

## 2021-07-15 NOTE — DISCHARGE SUMMARY
[facility]         HOSPITALIST  DISCHARGE SUMMARY    Patient Name: Beth Mullins  : 1975  MRN: [id number]    Date of Admission: 2021  Date of Discharge:  7/15/2021  Primary Care Physician: Amber Lundberg APRN    Consults     Date and Time Order Name Status Description    2021  2:07 PM Inpatient Hospitalist Consult            Active and Resolved Hospital Problems:  Left total knee arthroplasty  Hypertension  Nicotine dependence  GERD  Vitamin D deficiency  Seasonal allergies    Hospital Course     Hospital Course:  Patient is a 45-year-old female past medical history significant for osteoarthritis who presents for scheduled Left TKA. Patient states that prior to the procedure the pain had gradually been worsening over the past several years. The patient reports pain and functional impairment including activities of daily living, they have moderate to severe resting pain, chronic inflammation, and swelling. The patient states that this pain is no longer relieved with conservative therapies including NSAIDs, injections, and physical therapy. The pain is dull and achy in nature, nonradiating, worse with activity. Because of the above the patient is admitted for an postoperative pain control, symptom management and rehab evaluation.  Patient's discharge was held yesterday secondary to swelling, bruising and redness around the surgical site, this was evaluated by orthopedic surgery and there were no alarming features noted.  Patient was cleared for discharge and she should follow-up with Dr. Tejeda as scheduled in 2 weeks.  She is discharged home in stable condition.    Day of Discharge     Vital Signs:  Temp:  [98.4 °F (36.9 °C)-99.3 °F (37.4 °C)] 98.9 °F (37.2 °C)  Heart Rate:  [74-85] 85  Resp:  [16-22] 19  BP: (116-168)/() 167/100    Physical Exam:   General appearance: NAD, conversant   Eyes: anicteric sclerae, moist conjunctivae; no lid-lag; PERRLA  HENT:  Atraumatic; oropharynx clear with moist mucous membranes and no mucosal ulcerations; normal hard and soft palate  Neck: Trachea midline; FROM, supple, no thyromegaly or lymphadenopathy  Lungs: CTA, with normal respiratory effort and no intercostal retractions  CV: Regular Rate and Rhythm, no Murmurs, Rubs, or Gallops   Abdomen: Soft, non-tender; no masses or Heptosplenomegally  Extremities: No peripheral edema or extremity lymphadenopathy  Skin: Normal temperature, turgor and texture; no rash, ulcers or subcutaneous nodules, postsurgical changes of the knee noted, some bruising, some erythema  Psych: Appropriate affect, alert and oriented to person, place and time  Neuro: CN II - XII intact no motor deficits, no sensory defecits    Discharge Details        Discharge Medications      New Medications      Instructions Start Date   apixaban 2.5 MG tablet tablet  Commonly known as: ELIQUIS   Take 1 tablet by mouth Every 12 (Twelve) Hours.  Then start the aspirin.      aspirin  MG tablet   Take 1 tablet by mouth Daily.  Start after Eliquis has completed.      HYDROcodone-acetaminophen 7.5-325 MG per tablet  Commonly known as: Norco   1 tablet, Oral, Every 4 Hours PRN         Continue These Medications      Instructions Start Date   cetirizine 10 MG tablet  Commonly known as: zyrTEC   10 mg, Oral, Every Night at Bedtime      fluticasone 50 MCG/ACT nasal spray  Commonly known as: FLONASE   1 spray, Nasal, Daily      hydroCHLOROthiazide 12.5 MG capsule  Commonly known as: MICROZIDE   12.5 mg, Oral, Daily      lisinopril 10 MG tablet  Commonly known as: PRINIVIL,ZESTRIL   10 mg, Oral, Daily      sucralfate 1 GM/10ML suspension  Commonly known as: CARAFATE   1 g, Oral, 4 Times Daily With Meals & Nightly      vitamin D 1.25 MG (24859 UT) capsule capsule  Commonly known as: ERGOCALCIFEROL   1 capsule, Oral, Weekly         Stop These Medications    nabumetone 750 MG tablet  Commonly known as: RELAFEN            Allergies      Allergen Reactions   • Morphine Anaphylaxis       Discharge Disposition:  Home or Self Care    Diet:  Hospital:  Diet Order   Procedures   • Diet Regular       Discharge Activity:       CODE STATUS:  There are no questions and answers to display.         Future Appointments   Date Time Provider Department Center   7/16/2021 10:00 AM Jennifer Sloan PT [facility]   7/26/2021 10:00 AM Kamla Mcgregor PA [facility]       Additional Instructions for the Follow-ups that You Need to Schedule     Discharge Follow-up with PCP   As directed       Currently Documented PCP:    Amber Lundberg APRN    PCP Phone Number:    [phone]     Follow Up Details: 3 to 7 days         Discharge Follow-up with Specified Provider: ortho; 2 Weeks   As directed      To: ortho    Follow Up: 2 Weeks               Pertinent  and/or Most Recent Results     PROCEDURES:   Total knee arthroplasty    IMAGING:     XR Knee 1 or 2 View Left    Result Date: 7/13/2021  PROCEDURE: XR KNEE 1 OR 2 VW LEFT  COMPARISON: None  INDICATIONS: post op total knee left  FINDINGS:  There are postoperative changes from left total knee arthroplasty.  The components are normally aligned.  Fluid is seen in the suprapatella bursa consistent with recent surgery.  CONCLUSION: Normal postoperative change from left total knee arthroplasty.      JEREMIAH CHU MD       Electronically Signed and Approved By: JEREMIAH CHU MD on 7/13/2021 at 12:59             Peripheral Block    Result Date: 7/13/2021  Gustavo Jackson MD     7/13/2021 10:15 AM Peripheral Block Patient reassessed immediately prior to procedure Patient location during procedure: pre-op Start time: 7/13/2021 10:10 AM Stop time: 7/13/2021 10:13 AM Reason for block: at surgeon's request and post-op pain management Preanesthetic Checklist Completed: patient identified, IV checked, site marked, risks and benefits discussed, surgical consent, monitors and equipment checked, pre-op evaluation  and timeout performed Prep: Pt Position: supine Sterile barriers:alcohol skin prep, partial drape, cap, washed/disinfected hands, mask and gloves Prep: ChloraPrep Patient monitoring: blood pressure monitoring, continuous pulse oximetry and EKG Procedure Sedation:yes Performed under: local infiltration Guidance:ultrasound guided and nerve stimulator ULTRASOUND INTERPRETATION. Using ultrasound guidance a 20 G gauge needle was placed in close proximity to the nerve, at which point, under ultrasound guidance anesthetic was injected in the area of the nerve and spread of the anesthesia was seen on ultrasound in close proximity thereto.  There were no abnormalities seen on ultrasound; a digital image was taken; and the patient tolerated the procedure with no complications. Images:still images obtained, printed/placed on chart Laterality:left Block Type:adductor canal block Injection Technique:single-shot Needle Type:echogenic Needle Gauge:20 G (4in) Resistance on Injection: none Medications Used: bupivacaine-EPINEPHrine PF (MARCAINE w/EPI) 0.5% -1:[id number] injection, 30 mL Post Assessment Injection Assessment: negative aspiration for heme, no paresthesia on injection and incremental injection Patient Tolerance:comfortable throughout block Complications:no Additional Notes The block or continuous infusion is requested by the referring physician for management of postoperative pain, or pain related to a procedure. Ultrasound guidance (deemed medically necessary). Painless injection, pt was awake and conversant during the procedure without complications. Needle and surrounding structures visualized throughout procedure. No adverse reactions or complications seen during this period. Post-procedure image showed no signs of complication, and anatomy was consistent with an uncomplicated nerve blockade.       LAB RESULTS:      Lab 07/15/21  0429 07/14/21  0500   WBC 13.13* 19.95*   HEMOGLOBIN 9.4* 10.6*   HEMATOCRIT 28.4* 31.4*    PLATELETS 295 376   NEUTROS ABS 8.18* 17.38*   IMMATURE GRANS (ABS) 0.07* 0.08*   LYMPHS ABS 3.79* 1.40   MONOS ABS 0.96* 1.07*   EOS ABS 0.08 0.00   MCV 95.9 94.6         Lab 07/15/21  0429 07/14/21  0500   SODIUM 137 136   POTASSIUM 3.9 4.2   CHLORIDE 107 104   CO2 22.1 20.0*   ANION GAP 7.9 12.0   BUN 14 11   CREATININE 0.74 0.77   GLUCOSE 108* 166*   CALCIUM 8.3* 8.7   MAGNESIUM 1.9 1.9   PHOSPHORUS 3.2  --          Lab 07/15/21  0429 07/14/21  0500   TOTAL PROTEIN 5.6* 6.0   ALBUMIN 3.40* 3.70   GLOBULIN 2.2 2.3   ALT (SGPT) 11 16   AST (SGOT) 18 17   BILIRUBIN <0.2 <0.2   ALK PHOS 66 68                     Brief Urine Lab Results  (Last result in the past 365 days)      Color   Clarity   Blood   Leuk Est   Nitrite   Protein   CREAT   Urine HCG        06/06/21 1855 Yellow Clear Small (1+) Negative Negative Negative             Microbiology Results (last 10 days)     Procedure Component Value - Date/Time    COVID-19,CEPHEID,COR/BRICE/PAD/[facility]DIE IN-HOUSE(OR EMERGENT/ADD-ON),NP SWAB IN TRANSPORT MEDIA 3-4 HR TAT, RT-PCR - Swab, Nasopharynx [[id number]]  (Normal) Collected: 07/08/21 0952    Lab Status: Final result Specimen: Swab from Nasopharynx Updated: 07/08/21 1904     COVID19 Not Detected    Narrative:      Fact sheet for providers: https://www.fda.gov/media/634241/download     Fact sheet for patients: https://www.fda.gov/media/163677/download  Fact sheet for providers: https://www.fda.gov/media/215240/download     Fact sheet for patients: https://www.fda.gov/media/017236/download                      Time spent on Discharge including face to face service: Greater than 30 minutes      Electronically signed by Don Umaña MD, 07/15/21, 3:33 PM EDT.

## 2021-07-15 NOTE — PROGRESS NOTES
"    Orthopedic Total Joint Progress Note        Patient: Beth Mullins    Date of Admission: 7/13/2021  8:20 AM    YOB: 1975    Medical Record Number: [id number]    Attending Physician: Don Umaña MD      POD # 2 Days Post-Op Procedure(s) (LRB):  LEFT TOTAL KNEE ARTHROPLASTY WITH COMPUTER NAVIGATION (Left)       Systemic or Specific Complaints: The patient has had a relatively normal postoperative course.  The patient has had no current complaints. The patient has had improving normal postoperative pain.  The patient has had no issues with the wound. The patient's reports No Complaints.      Allergies:   Allergies   Allergen Reactions   • Morphine Anaphylaxis       Medications:   Current Medications:  Scheduled Meds:aspirin, 325 mg, Oral, Daily  cetirizine, 10 mg, Oral, Daily  sodium chloride, 3 mL, Intravenous, Q12H      Continuous Infusions:lactated ringers, 80 mL/hr, Last Rate: 80 mL/hr (07/13/21 1425)  Scopolamine, 1 patch      PRN Meds:.bisacodyl  •  bisacodyl  •  docusate sodium  •  HYDROcodone-acetaminophen  •  HYDROcodone-acetaminophen  •  HYDROmorphone  •  ketorolac  •  magnesium hydroxide  •  ondansetron **OR** ondansetron  •  sodium chloride      Physical Exam: 45 y.o. female   Wt Readings from Last 3 Encounters:   07/13/21 102 kg (225 lb 15.5 oz)   06/29/21 103 kg (226 lb 3.1 oz)   06/06/21 102 kg (224 lb 10.4 oz)     Ht Readings from Last 3 Encounters:   07/13/21 152.4 cm (60\")   06/29/21 152.4 cm (60\")   06/06/21 149.9 cm (59\")     Body mass index is 44.13 kg/m².    Vitals:    07/14/21 1132 07/14/21 2057 07/14/21 2300 07/15/21 0422   BP: 136/91 168/75 139/90 116/65   BP Location:  Right arm Right arm Right arm   Patient Position:  Lying Lying Lying   Pulse: 82 78 74 77   Resp: 18 22 20 16   Temp: 98.6 °F (37 °C) 99.3 °F (37.4 °C) 98.5 °F (36.9 °C) 98.7 °F (37.1 °C)   TempSrc: Oral Oral Oral Oral   SpO2: 100% 100% 97% 95%   Weight:       Height:            General Appearance:    " General: alert and oriented         Abdomen/:     soft non-tender, non-distended, voiding without difficulty       Extremities:   Operative extremity neurovascular status intact. ROM appropriate.  Incision intact w/out signs or symptoms of infection.  No cyanosis, calf is soft and nontender.     Activity: Mobilizing Per P.T.   Weight Bearing: As Tolerated    Diagnostic Tests:   Admission on 07/13/2021   Component Date Value Ref Range Status   • Glucose 07/14/2021 166* 65 - 99 mg/dL Final   • BUN 07/14/2021 11  6 - 20 mg/dL Final   • Creatinine 07/14/2021 0.77  0.57 - 1.00 mg/dL Final   • Sodium 07/14/2021 136  136 - 145 mmol/L Final   • Potassium 07/14/2021 4.2  3.5 - 5.2 mmol/L Final   • Chloride 07/14/2021 104  98 - 107 mmol/L Final   • CO2 07/14/2021 20.0* 22.0 - 29.0 mmol/L Final   • Calcium 07/14/2021 8.7  8.6 - 10.5 mg/dL Final   • Total Protein 07/14/2021 6.0  6.0 - 8.5 g/dL Final   • Albumin 07/14/2021 3.70  3.50 - 5.20 g/dL Final   • ALT (SGPT) 07/14/2021 16  1 - 33 U/L Final   • AST (SGOT) 07/14/2021 17  1 - 32 U/L Final   • Alkaline Phosphatase 07/14/2021 68  39 - 117 U/L Final   • Total Bilirubin 07/14/2021 <0.2  0.0 - 1.2 mg/dL Final   • eGFR Non  Amer 07/14/2021 82  >60 mL/min/1.73 Final   • Globulin 07/14/2021 2.3  gm/dL Final   • A/G Ratio 07/14/2021 1.6  g/dL Final   • BUN/Creatinine Ratio 07/14/2021 14.3  7.0 - 25.0 Final   • Anion Gap 07/14/2021 12.0  5.0 - 15.0 mmol/L Final   • Magnesium 07/14/2021 1.9  1.6 - 2.6 mg/dL Final   • WBC 07/14/2021 19.95* 3.40 - 10.80 10*3/mm3 Final   • RBC 07/14/2021 3.32* 3.77 - 5.28 10*6/mm3 Final   • Hemoglobin 07/14/2021 10.6* 12.0 - 15.9 g/dL Final   • Hematocrit 07/14/2021 31.4* 34.0 - 46.6 % Final   • MCV 07/14/2021 94.6  79.0 - 97.0 fL Final   • MCH 07/14/2021 31.9  26.6 - 33.0 pg Final   • MCHC 07/14/2021 33.8  31.5 - 35.7 g/dL Final   • RDW 07/14/2021 12.1* 12.3 - 15.4 % Final   • RDW-SD 07/14/2021 41.9  37.0 - 54.0 fl Final   • MPV 07/14/2021 9.3   6.0 - 12.0 fL Final   • Platelets 07/14/2021 376  140 - 450 10*3/mm3 Final   • Neutrophil % 07/14/2021 87.1* 42.7 - 76.0 % Final   • Lymphocyte % 07/14/2021 7.0* 19.6 - 45.3 % Final   • Monocyte % 07/14/2021 5.4  5.0 - 12.0 % Final   • Eosinophil % 07/14/2021 0.0* 0.3 - 6.2 % Final   • Basophil % 07/14/2021 0.1  0.0 - 1.5 % Final   • Immature Grans % 07/14/2021 0.4  0.0 - 0.5 % Final   • Neutrophils, Absolute 07/14/2021 17.38* 1.70 - 7.00 10*3/mm3 Final   • Lymphocytes, Absolute 07/14/2021 1.40  0.70 - 3.10 10*3/mm3 Final   • Monocytes, Absolute 07/14/2021 1.07* 0.10 - 0.90 10*3/mm3 Final   • Eosinophils, Absolute 07/14/2021 0.00  0.00 - 0.40 10*3/mm3 Final   • Basophils, Absolute 07/14/2021 0.02  0.00 - 0.20 10*3/mm3 Final   • Immature Grans, Absolute 07/14/2021 0.08* 0.00 - 0.05 10*3/mm3 Final   • nRBC 07/14/2021 0.0  0.0 - 0.2 /100 WBC Final   • Phosphorus 07/15/2021 3.2  2.5 - 4.5 mg/dL Final   • Glucose 07/15/2021 108* 65 - 99 mg/dL Final   • BUN 07/15/2021 14  6 - 20 mg/dL Final   • Creatinine 07/15/2021 0.74  0.57 - 1.00 mg/dL Final   • Sodium 07/15/2021 137  136 - 145 mmol/L Final   • Potassium 07/15/2021 3.9  3.5 - 5.2 mmol/L Final   • Chloride 07/15/2021 107  98 - 107 mmol/L Final   • CO2 07/15/2021 22.1  22.0 - 29.0 mmol/L Final   • Calcium 07/15/2021 8.3* 8.6 - 10.5 mg/dL Final   • Total Protein 07/15/2021 5.6* 6.0 - 8.5 g/dL Final   • Albumin 07/15/2021 3.40* 3.50 - 5.20 g/dL Final   • ALT (SGPT) 07/15/2021 11  1 - 33 U/L Final   • AST (SGOT) 07/15/2021 18  1 - 32 U/L Final   • Alkaline Phosphatase 07/15/2021 66  39 - 117 U/L Final   • Total Bilirubin 07/15/2021 <0.2  0.0 - 1.2 mg/dL Final   • eGFR Non  Amer 07/15/2021 86  >60 mL/min/1.73 Final   • Globulin 07/15/2021 2.2  gm/dL Final   • A/G Ratio 07/15/2021 1.5  g/dL Final   • BUN/Creatinine Ratio 07/15/2021 18.9  7.0 - 25.0 Final   • Anion Gap 07/15/2021 7.9  5.0 - 15.0 mmol/L Final   • Magnesium 07/15/2021 1.9  1.6 - 2.6 mg/dL Final   • WBC  07/15/2021 13.13* 3.40 - 10.80 10*3/mm3 Final   • RBC 07/15/2021 2.96* 3.77 - 5.28 10*6/mm3 Final   • Hemoglobin 07/15/2021 9.4* 12.0 - 15.9 g/dL Final   • Hematocrit 07/15/2021 28.4* 34.0 - 46.6 % Final   • MCV 07/15/2021 95.9  79.0 - 97.0 fL Final   • MCH 07/15/2021 31.8  26.6 - 33.0 pg Final   • MCHC 07/15/2021 33.1  31.5 - 35.7 g/dL Final   • RDW 07/15/2021 12.4  12.3 - 15.4 % Final   • RDW-SD 07/15/2021 43.1  37.0 - 54.0 fl Final   • MPV 07/15/2021 8.9  6.0 - 12.0 fL Final   • Platelets 07/15/2021 295  140 - 450 10*3/mm3 Final   • Neutrophil % 07/15/2021 62.3  42.7 - 76.0 % Final   • Lymphocyte % 07/15/2021 28.9  19.6 - 45.3 % Final   • Monocyte % 07/15/2021 7.3  5.0 - 12.0 % Final   • Eosinophil % 07/15/2021 0.6  0.3 - 6.2 % Final   • Basophil % 07/15/2021 0.4  0.0 - 1.5 % Final   • Immature Grans % 07/15/2021 0.5  0.0 - 0.5 % Final   • Neutrophils, Absolute 07/15/2021 8.18* 1.70 - 7.00 10*3/mm3 Final   • Lymphocytes, Absolute 07/15/2021 3.79* 0.70 - 3.10 10*3/mm3 Final   • Monocytes, Absolute 07/15/2021 0.96* 0.10 - 0.90 10*3/mm3 Final   • Eosinophils, Absolute 07/15/2021 0.08  0.00 - 0.40 10*3/mm3 Final   • Basophils, Absolute 07/15/2021 0.05  0.00 - 0.20 10*3/mm3 Final   • Immature Grans, Absolute 07/15/2021 0.07* 0.00 - 0.05 10*3/mm3 Final   • nRBC 07/15/2021 0.0  0.0 - 0.2 /100 WBC Final       Imaging Results (Last 72 Hours)     Procedure Component Value Units Date/Time    XR Knee 1 or 2 View Left [[id number]] Collected: 07/13/21 1259     Updated: 07/13/21 1303    Narrative:      PROCEDURE: XR KNEE 1 OR 2 VW LEFT     COMPARISON: None     INDICATIONS: post op total knee left     FINDINGS:   There are postoperative changes from left total knee arthroplasty.  The components are normally   aligned.  Fluid is seen in the suprapatella bursa consistent with recent surgery.     CONCLUSION: Normal postoperative change from left total knee arthroplasty.               JEREMIAH CHU MD         Electronically Signed and  Approved By: JEREMIAH CHU MD on 7/13/2021 at 12:59                           Personally viewed ortho images and report     Assessment:  Doing well 2 Days Post-Op following total joint replacement  Acute Blood Loss Anemia, stable  Post-operative Pain  Limited mobility, requires use of walker and assistance when OOB.    Patient Active Problem List   Diagnosis   • Osteoarthritis of left knee        Plan:    Consults: none  Continue to monitor labs and/or v/s, for tolerance to post op blood loss.  Continue efforts to increase mobilization.  Continue Pain Control Measures.  Continue incisional Care.  DVT prophylaxis.  Follow up in office with Cash Guan M.D. In 2 weeks.    Discharge Plan:today to when cleared by physical therapy as safe for discharge    Date: 7/15/2021  Don Acuna MD

## 2021-07-15 NOTE — THERAPY TREATMENT NOTE
Acute Care - Physical Therapy Treatment Note   [facility]     Patient Name: Beth Mullins  : 1975  MRN: [id number]  Today's Date: 7/15/2021   Onset of Illness/Injury or Date of Surgery: 21  Gait- individual; ther- ex -group setting; 5 participants        PT Assessment (last 12 hours)      PT Evaluation and Treatment     Row Name 07/15/21 1200          Physical Therapy Time and Intention    Subjective Information  complains of;pain;swelling  -RH     Document Type  therapy note (daily note)  -     Mode of Treatment  physical therapy;group therapy;individual therapy  -RH     Patient Effort  fair  -     Row Name 07/15/21 1200          Pain Scale: Word Pre/Post-Treatment    Posttreatment Pain Rating  10 - excruciating pain  -     Pain Location - Side  Left  -RH     Pain Location  knee  -RH     Pain Intervention(s)  Emotional support;Nursing Notified  -     Row Name 07/15/21 1200          Transfers    Transfers  sit-stand transfer;stand-sit transfer  -     Sit-Stand Cassia (Transfers)  contact guard  -     Stand-Sit Cassia (Transfers)  contact guard  -     Row Name 07/15/21 1200          Sit-Stand Transfer    Assistive Device (Sit-Stand Transfers)  walker, front-wheeled  -     Row Name 07/15/21 1200          Stand-Sit Transfer    Assistive Device (Stand-Sit Transfers)  walker, front-wheeled  -     Row Name 07/15/21 1200          Gait/Stairs (Locomotion)    Gait/Stairs Locomotion  gait/ambulation independence;gait/ambulation assistive device;distance ambulated  -     Cassia Level (Gait)  contact guard  -     Assistive Device (Gait)  walker, front-wheeled  -     Distance in Feet (Gait)  50  -RH     Row Name             Wound 21 1146 Left anterior knee Incision    Wound - Properties Group Placement Date: 21  -SC Placement Time: 1146  -SC Side: Left  -SC Orientation: anterior  -SC Location: knee  -SC Primary Wound Type: Incision  -SC    Retired Wound -  Properties Group Date first assessed: 07/13/21  -SC Time first assessed: 1146  -SC Side: Left  -SC Location: knee  -SC Primary Wound Type: Incision  -SC    Row Name 07/15/21 1200          Progress Summary (PT)    Progress Toward Functional Goals (PT)  progress toward functional goals is fair  -RH       User Key  (r) = Recorded By, (t) = Taken By, (c) = Cosigned By    Initials Name Provider Type    SC Kayla Felipe, RN Registered Nurse     Crispin Jackman PTA Physical Therapy Assistant       Left Knee Ther-ex   Exercise  Reps  Sets    Long arc Quads   10 2   Short arc Quads   10 2   Heel Slides  10 2   Ankle Pumps  10 2   Quad sets  10 2   Glut sets  10 2   Straight leg raise  10 2          Physical Therapy Education                 Title: PT OT SLP Therapies (Done)     Topic: Physical Therapy (Done)     Point: Mobility training (Done)     Learning Progress Summary           Patient Acceptance, E, VU by EVARISTO at 7/15/2021 0320    Acceptance, E,TB, VU,DU by YAW at 7/14/2021 1255    Acceptance, E,TB, VU by DAVID at 7/13/2021 1439                   Point: Home exercise program (Done)     Learning Progress Summary           Patient Acceptance, E, VU by EVARISTO at 7/15/2021 0320    Acceptance, E,TB, VU,DU by YAW at 7/14/2021 1255    Acceptance, E,TB, VU by DAVID at 7/13/2021 1439                   Point: Body mechanics (Done)     Learning Progress Summary           Patient Acceptance, E, VU by EVARISTO at 7/15/2021 0320    Acceptance, E,TB, VU,DU by YAW at 7/14/2021 1255    Acceptance, E,TB, VU by DAVID at 7/13/2021 1439                   Point: Precautions (Done)     Learning Progress Summary           Patient Acceptance, E, VU by EVARISTO at 7/15/2021 0320    Acceptance, E,TB, VU,DU by YAW at 7/14/2021 1255    Acceptance, E,TB, VU by DAVID at 7/13/2021 1439                               User Key     Initials Effective Dates Name Provider Type Discipline     06/16/21 -  Brittany Espinosa RN Registered Nurse Nurse    EVARISTO 06/16/21 -  Hue Salazar  RN Registered Nurse Nurse    DAVID 06/03/21 -  Lester Donis, PT Physical Therapist PT              PT Recommendation and Plan     Progress Summary (PT)  Progress Toward Functional Goals (PT): progress toward functional goals is fair  Outcome Measures     Row Name 07/15/21 1200 07/14/21 1200 07/13/21 1400       How much help from another person do you currently need...    Turning from your back to your side while in flat bed without using bedrails?  3  -RH  3  -RH  3  -DAVID    Moving from lying on back to sitting on the side of a flat bed without bedrails?  3  -RH  3  -RH  3  -DAVID    Moving to and from a bed to a chair (including a wheelchair)?  3  -RH  3  -RH  3  -DAVID    Standing up from a chair using your arms (e.g., wheelchair, bedside chair)?  3  -RH  3  -RH  3  -DAVID    Climbing 3-5 steps with a railing?  3  -RH  4  -RH  2  -DAVID    To walk in hospital room?  3  -RH  4  -RH  3  -DAVID    AM-PAC 6 Clicks Score (PT)  18  -RH  20  -RH  17  -DAVID       Functional Assessment    Outcome Measure Options  --  --  AM-PAC 6 Clicks Basic Mobility (PT)  -DAVID      User Key  (r) = Recorded By, (t) = Taken By, (c) = Cosigned By    Initials Name Provider Type    RH Crispin Jackman PTA Physical Therapy Assistant    Lester Cortez, PT Physical Therapist           Time Calculation:   PT Charges     Row Name 07/15/21 1204             Time Calculation    PT Received On  07/15/21  -RH         Timed Charges    83645 - Gait Training Minutes   8  -RH      73169 - PT Therapeutic Activity Minutes  4  -RH         Total Minutes    Timed Charges Total Minutes  12  -RH       Total Minutes  12  -RH        User Key  (r) = Recorded By, (t) = Taken By, (c) = Cosigned By    Initials Name Provider Type    RH Crispin Jackman PTA Physical Therapy Assistant        Therapy Charges for Today     Code Description Service Date Service Provider Modifiers Qty    23007206247 HC GAIT TRAINING EA 15 MIN 7/14/2021 Crispin Jackman PTA GP 1    11245448095 HC PT THER PROC GROUP  7/14/2021 Crispin Jackman, PTA GP 1    93470103128 HC GAIT TRAINING EA 15 MIN 7/14/2021 Crispin Jackman, PTA GP 1    56923616970 HC PT THER PROC GROUP 7/14/2021 Crispin Jackman, PTA GP 1    03526894930 HC GAIT TRAINING EA 15 MIN 7/15/2021 Crispin Jackman, PTA GP 1    35354134485 HC PT THER PROC GROUP 7/15/2021 Crispin Jackman, PTA GP 1          PT G-Codes  Outcome Measure Options: AM-PAC 6 Clicks Daily Activity (OT), Optimal Instrument  AM-PAC 6 Clicks Score (PT): 18  AM-PAC 6 Clicks Score (OT): 22    Crispin Jackman PTA  7/15/2021

## 2021-07-15 NOTE — SIGNIFICANT NOTE
07/15/21 0820   Plan   Plan Comments Patient did not discharge home yesterday. SW will need to call and reschedule outpatient therapy appointment for her.

## 2021-07-16 ENCOUNTER — TRANSCRIBE ORDERS (OUTPATIENT)
Dept: PHYSICAL THERAPY | Facility: CLINIC | Age: 46
End: 2021-07-16

## 2021-07-16 ENCOUNTER — TREATMENT (OUTPATIENT)
Dept: PHYSICAL THERAPY | Facility: CLINIC | Age: 46
End: 2021-07-16

## 2021-07-16 DIAGNOSIS — Z47.1 AFTERCARE FOLLOWING LEFT KNEE JOINT REPLACEMENT SURGERY: Primary | ICD-10-CM

## 2021-07-16 DIAGNOSIS — M25.562 ACUTE PAIN OF LEFT KNEE: ICD-10-CM

## 2021-07-16 DIAGNOSIS — R26.2 DIFFICULTY WALKING: ICD-10-CM

## 2021-07-16 DIAGNOSIS — Z96.652 PRESENCE OF LEFT ARTIFICIAL KNEE JOINT: ICD-10-CM

## 2021-07-16 DIAGNOSIS — Z96.652 AFTERCARE FOLLOWING LEFT KNEE JOINT REPLACEMENT SURGERY: Primary | ICD-10-CM

## 2021-07-16 PROCEDURE — 97161 PT EVAL LOW COMPLEX 20 MIN: CPT | Performed by: PHYSICAL THERAPIST

## 2021-07-16 PROCEDURE — 97140 MANUAL THERAPY 1/> REGIONS: CPT | Performed by: PHYSICAL THERAPIST

## 2021-07-16 NOTE — PROGRESS NOTES
Physical Therapy Initial Evaluation and Plan of Care        Patient: Beth Mullins   : 1975  Diagnosis/ICD-10 Code:  Aftercare following left knee joint replacement surgery [Z47.1, Z96.652]  Referring practitioner: Don Acuna MD  Date of Initial Visit: 2021  Today's Date: 2021  Patient seen for 1 sessions           Subjective Questionnaire: LEFS:       Subjective Evaluation    History of Present Illness  Mechanism of injury: Pt is s/p TKR 21. Patient was released from the hospital yesterday. Patient reports that she was using bilateral crutches intermittently for ambulation prior to sx due to knee pain.  Patient reports that she started having knee issues about a year ago after a fall.  Patient is taking hydrocodone and is using ice machine for pain and swelling control.  Patient is using a rolling walker for ambulation.  Patient reports that she has no stairs to get into stairs and has to use no stairs in the home currently.  Patient is a  but is off for the summer currently. Patient states that they kept her an extra day at the hospital due to increased LLE swelling and increased L shin/knee hematoma. Patient reports that she was discharged from the hospital with a low grade fever but was told not to be concerned unless it reached 101. Pt reports that she has been monitoring this frequently at home. Pt reports significantly increased swelling due to being removed from her dieretic.     Pain  Current pain ratin  At best pain ratin  At worst pain rating: 10  Quality: burning, dull ache and tight  Relieving factors: medications, ice and rest  Aggravating factors: ambulation, squatting, lifting, stairs, prolonged positioning, movement, standing, sleeping and repetitive movement  Progression: no change    Social Support  Lives in: multiple-level home  Lives with: spouse and young children    Treatments  Current treatment: physical therapy  Discharged from (in  last 30 days): inpatient hospitalization  Patient Goals  Patient goals for therapy: decreased pain, improved balance, increased motion, increased strength, independence with ADLs/IADLs, return to sport/leisure activities, return to work and decreased edema             Objective          Static Posture     Head  Forward.    Shoulders  Rounded.    Scapulae  Left protracted and right protracted.    Thoracic Spine  Hyperkyphosis.    Lumbar Spine   Increased lordosis.     Hip   Hip (Left): Externally rotated.     Knee   Genu valgus.   Knee (Left): Flexed.     Tenderness   Left Knee   Tenderness in the lateral joint line, lateral patella, medial joint line and medial patella.     Active Range of Motion   Left Knee   Flexion: 60 degrees   Extension: 15 (lacking 0) degrees     Right Knee   Normal active range of motion    Passive Range of Motion   Left Knee   Flexion: 90 degrees with pain  Extension: 8 (lacking 0) degrees with pain    Strength/Myotome Testing     Left Hip   Planes of Motion   Extension: 3+  Abduction: 4-    Right Hip   Planes of Motion   Flexion: 4  Extension: 4-  Abduction: 4-    Left Knee   Flexion: 3-  Extension: 3-  Quadriceps contraction: poor    Right Knee   Flexion: 4+  Extension: 4+    Left Ankle/Foot   Dorsiflexion: 4+    Right Ankle/Foot   Dorsiflexion: 4+    Swelling     Left Knee Girth Measurement (cm)   Joint line: 45 cm    Ambulation     Observational Gait   Gait: antalgic   Decreased walking speed, stride length, left stance time and left step length.   Left foot contact pattern: foot flat    Additional Observational Gait Details  Pt ambulating with RW     General Comments     Knee Comments  Significant hematoma noted on medial left knee/calf area as well as increased swelling in LLE. Patient reports that this remains unchanged from yesterday when she was discharged from the hospital and the MD was aware. Patient demonstrates no abnormal tenderness in calf area. L knee incision was inspected  and no significant drainage or redness noted. Pt educated on signs and symptoms of DVT and infection and was told if any worsening of symptoms occur to contact MD immediately.          Assessment & Plan     Assessment  Impairments: abnormal gait, abnormal muscle firing, abnormal or restricted ROM, activity intolerance, impaired balance, impaired physical strength, lacks appropriate home exercise program, pain with function, safety issue and weight-bearing intolerance  Assessment details: Patient presents s/p L TKR and demonstrates decreased knee ROM, bilateral hip and left knee weakness/swelling and reports of pain limiting function during ADLs as shown on the LEFS.  Patient would benefit from skilled PT services in order to address remaining deficits limiting function at this time.       Prognosis: good  Functional Limitations: walking, uncomfortable because of pain, moving in bed, standing and stooping  Goals  Plan Goals: 1. The patient has limited ROM of the left knee.   LTG 1: 12 weeks:  The patient will demonstrate 0 to 120 degrees of ROM for the left knee in order to allow patient to complete prolonged walking, standing, stairs and other ADLs with decreased pain/difficulty.    STATUS:  New   STG 1a:  6 weeks:  The patient will demonstrate 5 to 105 degrees of ROM for the left knee.    STATUS:  New   TREATMENT: Manual therapy, therapeutic exercise, home exercise instruction, and modalities as needed to include:  moist heat, electrical stimulation, ultrasound, and ice.    2. The patient has limited strength of the left knee.   LTG 2: 12 weeks: The patient will demonstrate 4+/5 strength for left knee flexion and extension in order to allow patient improved joint stability    STATUS:  New   STG 2a: 6 weeks: The patient will demonstrate 4/5 strength for left knee flexion and extension    STATUS:  New    TREATMENT: Manual therapy, therapeutic exercise, home exercise instruction, aquatic therapy, and modalities as  needed to include:  moist heat, electrical stimulation, ultrasound, and ice.     3. The patient has gait dysfunction.   LTG 3: 12 weeks:  The patient will ambulate without assistive device, independently, for community distances with minimal limp to the left lower extremity in order to improve mobility and allow patient to perform activities such as grocery shopping with greater ease.    STATUS:  New   TREATMENT: Gait training, aquatic therapy, therapeutic exercise, and home exercise instruction.    4. Mobility: Walking/Moving Around Functional Limitation     LTG 4: 12 weeks:  The patient will demonstrate 1-19 % limitation by achieving a score of 65 on the LEFS.    STATUS:  New   STG 4 a: 6 weeks:  The patient will demonstrate 20-39 % limitation by achieving a score of 50 on the LEFS.      STATUS:  New   TREATMENT:  Manual therapy, therapeutic exercise, home exercise instruction.       Plan  Therapy options: will be seen for skilled physical therapy services  Planned modality interventions: cryotherapy, electrical stimulation/Russian stimulation and TENS  Planned therapy interventions: balance/weight-bearing training, ADL retraining, soft tissue mobilization, strengthening, stretching, therapeutic activities, joint mobilization, home exercise program, gait training, functional ROM exercises, flexibility, body mechanics training, postural training, neuromuscular re-education, manual therapy and spinal/joint mobilization  Frequency: 3x week  Duration in weeks: 12  Treatment plan discussed with: patient        Timed:         Manual Therapy:    10     mins  21985;     Therapeutic Exercise:    7     mins  09468;     Neuromuscular Jatin:    0    mins  62820;    Therapeutic Activity:     0     mins  93627;     Gait Trainin     mins  99206;     Ultrasound:     0     mins  65084;    Ionto                               0    mins   07343  Self-care  __0__ mins 87532    Un-Timed:  Electrical Stimulation:    0     mins   09663 ( );  Traction     0     mins 59649  Low Eval     0     Mins  12816  Mod Eval     33     Mins  27246  High Eval                       0     Mins  91782  Hot pack     0     Mins    Cold pack                       0     Mins      Timed Treatment:   17   mins   Total Treatment:     50   mins    PT SIGNATURE: Jennifer Sloan, PT   DATE TREATMENT INITIATED: 7/16/2021    Initial Certification  Certification Period: 10/14/2021  I certify that the therapy services are furnished while this patient is under my care.  The services outlined above are required by this patient, and will be reviewed every 90 days.     PHYSICIAN: Don Acuna MD      DATE:     Please sign and return via fax to [phone].. Thank you, [facility].

## 2021-07-19 ENCOUNTER — TREATMENT (OUTPATIENT)
Dept: PHYSICAL THERAPY | Facility: CLINIC | Age: 46
End: 2021-07-19

## 2021-07-19 ENCOUNTER — TELEPHONE (OUTPATIENT)
Dept: ORTHOPEDIC SURGERY | Facility: CLINIC | Age: 46
End: 2021-07-19

## 2021-07-19 DIAGNOSIS — Z96.652 PRESENCE OF LEFT ARTIFICIAL KNEE JOINT: ICD-10-CM

## 2021-07-19 DIAGNOSIS — Z96.652 S/P TOTAL KNEE ARTHROPLASTY, LEFT: Primary | ICD-10-CM

## 2021-07-19 DIAGNOSIS — R26.2 DIFFICULTY WALKING: ICD-10-CM

## 2021-07-19 DIAGNOSIS — M25.562 ACUTE PAIN OF LEFT KNEE: ICD-10-CM

## 2021-07-19 DIAGNOSIS — Z47.1 AFTERCARE FOLLOWING LEFT KNEE JOINT REPLACEMENT SURGERY: Primary | ICD-10-CM

## 2021-07-19 DIAGNOSIS — M17.12 PRIMARY OSTEOARTHRITIS OF LEFT KNEE: ICD-10-CM

## 2021-07-19 DIAGNOSIS — Z96.652 AFTERCARE FOLLOWING LEFT KNEE JOINT REPLACEMENT SURGERY: Primary | ICD-10-CM

## 2021-07-19 PROCEDURE — 97110 THERAPEUTIC EXERCISES: CPT | Performed by: PHYSICAL THERAPIST

## 2021-07-19 PROCEDURE — 97140 MANUAL THERAPY 1/> REGIONS: CPT | Performed by: PHYSICAL THERAPIST

## 2021-07-19 NOTE — TELEPHONE ENCOUNTER
Patient called and requesting refill of pain meds.  [facility] is pharmacy.  Patient phone is [phone].

## 2021-07-19 NOTE — PROGRESS NOTES
Physical Therapy Daily Progress Note        Patient: Beth Mullins   : 1975  Diagnosis/ICD-10 Code:  Aftercare following left knee joint replacement surgery [Z47.1, Z96.652]  Referring practitioner: Don Acuna MD  Date of Initial Visit: Type: THERAPY  Noted: 2021  Today's Date: 2021  Patient seen for 2 sessions             Subjective   Beth Mullins reports: having increased pain in his entire knee, states 9/10 pain; lots of bruising around her ankle as well. Reports pain being so bad last night that she got sick. Not sleeping well, both due to pain an uncomfortable.    Objective   Knee Flexion: 75 deg c heel slide  Knee Extension: -5 deg c quad set.     Pt unable to rest foot on table with foam roll under knee, increased pain and tingling with foam pressing behind swollen knee.     See Exercise, Manual, and Modality Logs for complete treatment.       Assessment/Plan  Beth still experiencing increased L knee pain, especially across the knee joint. Pt had some increased discomfort with majority of PT exercises. Pt able to achieve -5 deg of knee extension and 75 deg of knee flexion. Pt would benefit from skilled PT to address Range of Motion  and Strength deficits, pain management and any concerns with ADLs.     Progress per Plan of Care           Timed:  Manual Therapy:    25     mins  85163;  Therapeutic Exercise:    15     mins  34134;     Neuromuscular Jatin:        mins  55495;    Therapeutic Activity:          mins  49494;     Gait Training:           mins  47552;       Untimed:  Electrical Stimulation:         mins  77672 ( );  Mechanical Traction:         mins  05101;       Timed Treatment:   40   mins   Total Treatment:     40   mins        Shwetha Jon PTA  Physical Therapist Assistant

## 2021-07-21 ENCOUNTER — TREATMENT (OUTPATIENT)
Dept: PHYSICAL THERAPY | Facility: CLINIC | Age: 46
End: 2021-07-21

## 2021-07-21 DIAGNOSIS — M25.562 ACUTE PAIN OF LEFT KNEE: ICD-10-CM

## 2021-07-21 DIAGNOSIS — R26.2 DIFFICULTY WALKING: ICD-10-CM

## 2021-07-21 DIAGNOSIS — Z96.652 AFTERCARE FOLLOWING LEFT KNEE JOINT REPLACEMENT SURGERY: Primary | ICD-10-CM

## 2021-07-21 DIAGNOSIS — Z47.1 AFTERCARE FOLLOWING LEFT KNEE JOINT REPLACEMENT SURGERY: Primary | ICD-10-CM

## 2021-07-21 DIAGNOSIS — Z96.652 PRESENCE OF LEFT ARTIFICIAL KNEE JOINT: ICD-10-CM

## 2021-07-21 PROCEDURE — 97140 MANUAL THERAPY 1/> REGIONS: CPT | Performed by: PHYSICAL THERAPIST

## 2021-07-21 PROCEDURE — 97110 THERAPEUTIC EXERCISES: CPT | Performed by: PHYSICAL THERAPIST

## 2021-07-21 RX ORDER — HYDROCODONE BITARTRATE AND ACETAMINOPHEN 7.5; 325 MG/1; MG/1
1 TABLET ORAL EVERY 4 HOURS PRN
Qty: 42 TABLET | Refills: 0 | Status: SHIPPED | OUTPATIENT
Start: 2021-07-21 | End: 2021-07-26 | Stop reason: SDUPTHER

## 2021-07-21 NOTE — PROGRESS NOTES
Physical Therapy Daily Progress Note        Patient: Beth Mullins   : 1975  Diagnosis/ICD-10 Code:  Aftercare following left knee joint replacement surgery [Z47.1, Z96.652]  Referring practitioner: Don Acuna MD  Date of Initial Visit: Type: THERAPY  Noted: 2021  Today's Date: 2021  Patient seen for 3 sessions             Subjective   Beth Mullins reports that she has been feeling better.  She rates her left knee pain at 5/10 upon arrival.      Objective     Left Knee ROM:  Flexion:  77 degrees AROM / 85 degrees PROM  Extension:  7 degrees AROM / 5 degrees PROM (from full extension)    See Exercise and Manual, and Modality Logs for complete treatment.       Assessment/Plan  Pt tolerated therapy session well - with progression of therapeutic exercises, CKC-Functional activities, and Manual therapy. She has improved, but continues to have deficits in Left Knee ROM,  Strength, and Stability; limiting function and ability to perform ADLs at this time.    Progress per Plan of Care           Timed:  Manual Therapy:    25     mins  22599;  Therapeutic Exercise:    30     mins  51260;     Neuromuscular Jatin:    0    mins  38310;    Therapeutic Activity:     0     mins  90641;     Gait Trainin     mins  42767;     Ultrasound:     0     mins  69095;    Electrical Stimulation:    0     mins  96696;  Iontophoresis     0     mins  59368    Untimed:  Electrical Stimulation:    0     mins  54132 ( );  Mechanical Traction:    0     mins  79339;   Fluidotherapy     0     mins  9702      Timed Treatment:   55   mins   Total Treatment:     55   mins        Lisbeth Carr PTA  Physical Therapist Assistant

## 2021-07-23 ENCOUNTER — TREATMENT (OUTPATIENT)
Dept: PHYSICAL THERAPY | Facility: CLINIC | Age: 46
End: 2021-07-23

## 2021-07-23 DIAGNOSIS — M25.562 ACUTE PAIN OF LEFT KNEE: ICD-10-CM

## 2021-07-23 DIAGNOSIS — Z96.652 AFTERCARE FOLLOWING LEFT KNEE JOINT REPLACEMENT SURGERY: Primary | ICD-10-CM

## 2021-07-23 DIAGNOSIS — Z96.652 PRESENCE OF LEFT ARTIFICIAL KNEE JOINT: ICD-10-CM

## 2021-07-23 DIAGNOSIS — Z47.1 AFTERCARE FOLLOWING LEFT KNEE JOINT REPLACEMENT SURGERY: Primary | ICD-10-CM

## 2021-07-23 DIAGNOSIS — R26.2 DIFFICULTY WALKING: ICD-10-CM

## 2021-07-23 PROCEDURE — 97140 MANUAL THERAPY 1/> REGIONS: CPT | Performed by: PHYSICAL THERAPIST

## 2021-07-23 PROCEDURE — 97110 THERAPEUTIC EXERCISES: CPT | Performed by: PHYSICAL THERAPIST

## 2021-07-26 ENCOUNTER — TREATMENT (OUTPATIENT)
Dept: PHYSICAL THERAPY | Facility: CLINIC | Age: 46
End: 2021-07-26

## 2021-07-26 ENCOUNTER — OFFICE VISIT (OUTPATIENT)
Dept: ORTHOPEDIC SURGERY | Facility: CLINIC | Age: 46
End: 2021-07-26

## 2021-07-26 VITALS — HEART RATE: 102 BPM | HEIGHT: 60 IN | BODY MASS INDEX: 44.37 KG/M2 | WEIGHT: 226 LBS | OXYGEN SATURATION: 98 %

## 2021-07-26 DIAGNOSIS — Z96.652 S/P TOTAL KNEE ARTHROPLASTY, LEFT: ICD-10-CM

## 2021-07-26 DIAGNOSIS — M17.12 PRIMARY OSTEOARTHRITIS OF LEFT KNEE: ICD-10-CM

## 2021-07-26 DIAGNOSIS — Z47.1 AFTERCARE FOLLOWING LEFT KNEE JOINT REPLACEMENT SURGERY: Primary | ICD-10-CM

## 2021-07-26 DIAGNOSIS — M25.562 ACUTE PAIN OF LEFT KNEE: ICD-10-CM

## 2021-07-26 DIAGNOSIS — M25.562 LEFT KNEE PAIN, UNSPECIFIED CHRONICITY: Primary | ICD-10-CM

## 2021-07-26 DIAGNOSIS — R26.2 DIFFICULTY WALKING: ICD-10-CM

## 2021-07-26 DIAGNOSIS — Z96.652 AFTERCARE FOLLOWING LEFT KNEE JOINT REPLACEMENT SURGERY: Primary | ICD-10-CM

## 2021-07-26 DIAGNOSIS — Z96.652 PRESENCE OF LEFT ARTIFICIAL KNEE JOINT: ICD-10-CM

## 2021-07-26 PROCEDURE — 97140 MANUAL THERAPY 1/> REGIONS: CPT | Performed by: PHYSICAL THERAPIST

## 2021-07-26 PROCEDURE — 99024 POSTOP FOLLOW-UP VISIT: CPT | Performed by: PHYSICIAN ASSISTANT

## 2021-07-26 PROCEDURE — 97110 THERAPEUTIC EXERCISES: CPT | Performed by: PHYSICAL THERAPIST

## 2021-07-26 RX ORDER — HYDROCODONE BITARTRATE AND ACETAMINOPHEN 7.5; 325 MG/1; MG/1
1 TABLET ORAL EVERY 4 HOURS PRN
Qty: 42 TABLET | Refills: 0 | Status: SHIPPED | OUTPATIENT
Start: 2021-07-26 | End: 2021-07-30 | Stop reason: SDUPTHER

## 2021-07-26 NOTE — PROGRESS NOTES
"Chief Complaint  Pain of the Left Knee    Subjective          Beth Mullins presents to [facility] for s/p left total knee arthroplasty on 07-13-21 by Dr. Acuna. Patient states she has had pain of her knee, that is slowly improving. She states she is currently going to PT three times weekly. She states PT is painful, but feels she is making progress in ROM. She states she is lacking 5 degrees extension this week in PT. She states she has been working on stairs at PT.     Objective   Vital Signs:   Pulse 102   Ht 151.1 cm (59.5\")   Wt 103 kg (226 lb)   SpO2 98%   BMI 44.88 kg/m²       Physical Exam  Constitutional:       Appearance: Normal appearance. He is well-developed and normal weight.   HENT:      Head: Normocephalic.      Right Ear: Hearing and external ear normal.      Left Ear: Hearing and external ear normal.      Nose: Nose normal.   Eyes:      Conjunctiva/sclera: Conjunctivae normal.   Cardiovascular:      Rate and Rhythm: Normal rate.   Pulmonary:      Effort: Pulmonary effort is normal.      Breath sounds: No wheezing or rales.   Abdominal:      Palpations: Abdomen is soft.      Tenderness: There is no abdominal tenderness.   Musculoskeletal:      Cervical back: Normal range of motion.   Skin:     Findings: No rash.   Neurological:      Mental Status: He is alert and oriented to person, place, and time.   Psychiatric:         Mood and Affect: Mood and affect normal.         Judgment: Judgment normal.     Ortho Exam  Left knee: Incision is well-healing, no heat, tenderness or redness.  Mild swelling across the joint down into the ankle.  Patient ambulates with walker.  Sensation is intact.  Neurovascular is intact.  Calf is soft nontender.  Negative Homans.  Active range of motion is -5 to 85 degrees of flexion.  Muscle tone the quadriceps and hamstrings muscles.  Patella is well tracking.  Stable to varus and valgus stress.  Normal plantar and dorsiflexion, good muscle " tone of ankle flexors.        Result Review :   The following data was reviewed by: MARCO ANTONIO Tracey on 07/26/2021:         Imaging Results (Most Recent)     Procedure Component Value Units Date/Time    XR Knee 3 View Left [[id number]] Resulted: 07/26/21 1241     Updated: 07/26/21 1242    Narrative:      X-Ray Report:  Study: X-rays ordered, taken in the office, and reviewed today  Site: Left knee xray  Indication: Left knee pain  View: AP, Lateral and Sunrise view(s)  Findings: Good alignment of left total knee arthroplasty.  No signs of   hardware failure or loosening.  Prior studies available for comparison: no                   Assessment and Plan    Problem List Items Addressed This Visit        Musculoskeletal and Injuries    Left knee pain - Primary    Relevant Orders    XR Knee 3 View Left (Completed)    S/P total knee arthroplasty, left          Follow Up   Return in about 4 weeks (around 8/23/2021).  Patient Instructions   · Incision care  · Continue ice and elevation for associated swelling  · Stretching and strengthening exercises and improving ROM at PT.   · Falls precautions  · Patient advised to call if she experiences fever, chills, drainage from incision or anorexia.   · Follow up in 4 week(s)      Patient was given instructions and counseling regarding her condition or for health maintenance advice. Please see specific information pulled into the AVS if appropriate.

## 2021-07-26 NOTE — PROGRESS NOTES
Physical Therapy Daily Progress Note        Patient: Beth Mullins   : 1975  Diagnosis/ICD-10 Code:  Aftercare following left knee joint replacement surgery [Z47.1, Z96.652]  Referring practitioner: Don Acuna MD  Date of Initial Visit: Type: THERAPY  Noted: 2021  Today's Date: 2021  Patient seen for 5 sessions             Subjective   Beth Mullins reports: getting her staples out this morning, feels like the pain in her thigh has gotten better. States that she still isn't sleeping great, takes pain pills before going to bed and is able to sleep about 2 hours before the pain wakes her up.    Pain: 4-5/10 pain    Objective   Knee Flexion: 95 deg, following Hamstring stretch  Knee Extension: -3 deg c heel prop    See Exercise, Manual, and Modality Logs for complete treatment.       Assessment/Plan  Beth still experiencing increased L knee pain, especially at night. Pt had some increased discomfort with end range knee flexion. Pt able to achieve -3 deg of knee extension and 95 deg of knee flexion. Pt would benefit from skilled PT to address Range of Motion  and Strength deficits, pain management and any concerns with ADLs.       Progress per Plan of Care           Timed:  Manual Therapy:    15     mins  08616;  Therapeutic Exercise:    25     mins  82466;     Neuromuscular Jatin:        mins  54994;    Therapeutic Activity:          mins  39354;     Gait Training:           mins  55679;       Untimed:  Electrical Stimulation:         mins  17055 ( );  Mechanical Traction:         mins  88987;       Timed Treatment:   40   mins   Total Treatment:     40   mins        Shwetha Jon PTA  Physical Therapist Assistant

## 2021-07-26 NOTE — PATIENT INSTRUCTIONS
· Incision care  · Continue ice and elevation for associated swelling  · Stretching and strengthening exercises and improving ROM at PT.   · Falls precautions  · Patient advised to call if she experiences fever, chills, drainage from incision or anorexia.   · Follow up in 4 week(s)

## 2021-07-29 ENCOUNTER — TREATMENT (OUTPATIENT)
Dept: PHYSICAL THERAPY | Facility: CLINIC | Age: 46
End: 2021-07-29

## 2021-07-29 DIAGNOSIS — Z96.652 AFTERCARE FOLLOWING LEFT KNEE JOINT REPLACEMENT SURGERY: Primary | ICD-10-CM

## 2021-07-29 DIAGNOSIS — Z96.652 PRESENCE OF LEFT ARTIFICIAL KNEE JOINT: ICD-10-CM

## 2021-07-29 DIAGNOSIS — Z47.1 AFTERCARE FOLLOWING LEFT KNEE JOINT REPLACEMENT SURGERY: Primary | ICD-10-CM

## 2021-07-29 DIAGNOSIS — R26.2 DIFFICULTY WALKING: ICD-10-CM

## 2021-07-29 DIAGNOSIS — M25.562 ACUTE PAIN OF LEFT KNEE: ICD-10-CM

## 2021-07-29 PROCEDURE — 97110 THERAPEUTIC EXERCISES: CPT | Performed by: PHYSICAL THERAPIST

## 2021-07-29 PROCEDURE — 97140 MANUAL THERAPY 1/> REGIONS: CPT | Performed by: PHYSICAL THERAPIST

## 2021-07-29 NOTE — PROGRESS NOTES
Physical Therapy Daily Progress Note        Patient: Beth Mullins   : 1975  Diagnosis/ICD-10 Code:  Aftercare following left knee joint replacement surgery [Z47.1, Z96.652]  Referring practitioner: Don Acuna MD  Date of Initial Visit: Type: THERAPY  Noted: 2021  Today's Date: 2021  Patient seen for 6 sessions             Subjective   Beth Mullins reports: 4-5/10 pain in L knee, states that it was hurting so bad that she wasn't able to get to sleep until 5am. She slept really well after she did fall asleep.     Objective   Knee Flexion: 103 deg c heel slide  Knee Extension: -1 deg c heel prop    See Exercise, Manual, and Modality Logs for complete treatment.       Assessment/Plan  Beth still experiencing increased L knee pain, especially when trying to sleep at night. Pt had some increased discomfort with end range knee flexion. Pt able to achieve -1 deg of knee extension and 103 deg of knee flexion. Pt would benefit from skilled PT to address Range of Motion  and Strength deficits, pain management and any concerns with ADLs.     Progress per Plan of Care           Timed:  Manual Therapy:    10     mins  90387;  Therapeutic Exercise:    20     mins  69046;     Neuromuscular Jatin:        mins  27102;    Therapeutic Activity:          mins  60478;     Gait Training:           mins  93465;       Untimed:  Electrical Stimulation:         mins  97664 ( );  Mechanical Traction:         mins  04053;       Timed Treatment:   30   mins   Total Treatment:     30   mins        Shwetha Jon PTA  Physical Therapist Assistant

## 2021-07-30 DIAGNOSIS — Z96.652 S/P TOTAL KNEE ARTHROPLASTY, LEFT: ICD-10-CM

## 2021-07-30 DIAGNOSIS — M17.12 PRIMARY OSTEOARTHRITIS OF LEFT KNEE: ICD-10-CM

## 2021-07-30 RX ORDER — HYDROCODONE BITARTRATE AND ACETAMINOPHEN 7.5; 325 MG/1; MG/1
1 TABLET ORAL EVERY 4 HOURS PRN
Qty: 42 TABLET | Refills: 0 | Status: SHIPPED | OUTPATIENT
Start: 2021-07-30 | End: 2021-08-05 | Stop reason: SDUPTHER

## 2021-08-02 ENCOUNTER — TREATMENT (OUTPATIENT)
Dept: PHYSICAL THERAPY | Facility: CLINIC | Age: 46
End: 2021-08-02

## 2021-08-02 DIAGNOSIS — Z96.652 PRESENCE OF LEFT ARTIFICIAL KNEE JOINT: ICD-10-CM

## 2021-08-02 DIAGNOSIS — Z96.652 AFTERCARE FOLLOWING LEFT KNEE JOINT REPLACEMENT SURGERY: Primary | ICD-10-CM

## 2021-08-02 DIAGNOSIS — R26.2 DIFFICULTY WALKING: ICD-10-CM

## 2021-08-02 DIAGNOSIS — M25.562 ACUTE PAIN OF LEFT KNEE: ICD-10-CM

## 2021-08-02 DIAGNOSIS — Z47.1 AFTERCARE FOLLOWING LEFT KNEE JOINT REPLACEMENT SURGERY: Primary | ICD-10-CM

## 2021-08-02 PROCEDURE — 97110 THERAPEUTIC EXERCISES: CPT | Performed by: PHYSICAL THERAPIST

## 2021-08-02 PROCEDURE — 97140 MANUAL THERAPY 1/> REGIONS: CPT | Performed by: PHYSICAL THERAPIST

## 2021-08-02 NOTE — PROGRESS NOTES
Physical Therapy Daily Progress Note        Patient: Beth Mullins   : 1975  Diagnosis/ICD-10 Code:  Aftercare following left knee joint replacement surgery [Z47.1, Z96.652]  Referring practitioner: Don Acuna MD  Date of Initial Visit: Type: THERAPY  Noted: 2021  Today's Date: 2021  Patient seen for 7 sessions             Subjective   Beth Mullins reports: going for a walk with her grandchildren around her neighborhood, less than half a mile. States that her knee felt really good the first 10min an then got tight; reports being really sore afterwards.     L Knee: 210 pain    Objective   Knee Flexion: 100deg c heel slide  Knee Flexion: 0deg c heel prop    See Exercise, Manual, and Modality Logs for complete treatment.       Assessment/Plan  Beth progressing as evident by decreased overall knee pain. Pt able to achieve 0 deg of knee extension and 100 deg of knee flexion. Pt would benefit from skilled PT to address Range of Motion  and Strength deficits, pain management and any concerns with ADLs.     Progress per Plan of Care           Timed:  Manual Therapy:    15     mins  73978;  Therapeutic Exercise:    25     mins  70166;     Neuromuscular Jatin:        mins  38575;    Therapeutic Activity:          mins  18926;     Gait Training:           mins  13797;    Aquatic Therapy:          mins  17438;       Untimed:  Electrical Stimulation:         mins  70356 ( );  Mechanical Traction:         mins  77473;       Timed Treatment:   40   mins   Total Treatment:     40   mins        Shwetha Jon PTA  Physical Therapist Assistant

## 2021-08-05 ENCOUNTER — TREATMENT (OUTPATIENT)
Dept: PHYSICAL THERAPY | Facility: CLINIC | Age: 46
End: 2021-08-05

## 2021-08-05 DIAGNOSIS — R26.2 DIFFICULTY WALKING: ICD-10-CM

## 2021-08-05 DIAGNOSIS — M25.562 ACUTE PAIN OF LEFT KNEE: ICD-10-CM

## 2021-08-05 DIAGNOSIS — Z96.652 AFTERCARE FOLLOWING LEFT KNEE JOINT REPLACEMENT SURGERY: Primary | ICD-10-CM

## 2021-08-05 DIAGNOSIS — Z96.652 PRESENCE OF LEFT ARTIFICIAL KNEE JOINT: ICD-10-CM

## 2021-08-05 DIAGNOSIS — M17.12 PRIMARY OSTEOARTHRITIS OF LEFT KNEE: ICD-10-CM

## 2021-08-05 DIAGNOSIS — Z47.1 AFTERCARE FOLLOWING LEFT KNEE JOINT REPLACEMENT SURGERY: Primary | ICD-10-CM

## 2021-08-05 DIAGNOSIS — Z96.652 S/P TOTAL KNEE ARTHROPLASTY, LEFT: ICD-10-CM

## 2021-08-05 PROCEDURE — 97110 THERAPEUTIC EXERCISES: CPT | Performed by: PHYSICAL THERAPIST

## 2021-08-05 PROCEDURE — 97140 MANUAL THERAPY 1/> REGIONS: CPT | Performed by: PHYSICAL THERAPIST

## 2021-08-05 NOTE — PROGRESS NOTES
Physical Therapy Daily Progress Note        Patient: Beth Mullins   : 1975  Diagnosis/ICD-10 Code:  Aftercare following left knee joint replacement surgery [Z47.1, Z96.652]  Referring practitioner: Don Acuna MD  Date of Initial Visit: Type: THERAPY  Noted: 2021  Today's Date: 2021  Patient seen for 8 sessions             Subjective   Beth Mullins reports: doing a little bit more walking the last few days. States that she did take her pain pill a little late today, so she is having more pain.   L Knee: 5-610 pain    Objective   Knee Flexion: 100 deg c heel slide  Knee Extension: 0 deg c heel prop    See Exercise, Manual, and Modality Logs for complete treatment.       Assessment/Plan  Beth experiencing increased pain in L knee, although she did domore walking the last few days. Pt able to achieve 0 deg of knee extension and 100 deg of knee flexion. Pt would benefit from skilled PT to address Range of Motion  and Strength deficits, pain management and any concerns with ADLs.     Progress per Plan of Care           Timed:  Manual Therapy:    15     mins  97306;  Therapeutic Exercise:    25     mins  04438;     Neuromuscular Jatin:        mins  29861;    Therapeutic Activity:          mins  40187;     Gait Training:           mins  29213;    Aquatic Therapy:          mins  05690;       Untimed:  Electrical Stimulation:         mins  25726 ( );  Mechanical Traction:         mins  27625;       Timed Treatment:   40   mins   Total Treatment:     40   mins        Shwetha Jon PTA  Physical Therapist Assistant

## 2021-08-06 RX ORDER — HYDROCODONE BITARTRATE AND ACETAMINOPHEN 7.5; 325 MG/1; MG/1
1 TABLET ORAL EVERY 4 HOURS PRN
Qty: 42 TABLET | Refills: 0 | Status: SHIPPED | OUTPATIENT
Start: 2021-08-06 | End: 2021-08-13 | Stop reason: SDUPTHER

## 2021-08-10 ENCOUNTER — TREATMENT (OUTPATIENT)
Dept: PHYSICAL THERAPY | Facility: CLINIC | Age: 46
End: 2021-08-10

## 2021-08-10 DIAGNOSIS — Z96.652 PRESENCE OF LEFT ARTIFICIAL KNEE JOINT: ICD-10-CM

## 2021-08-10 DIAGNOSIS — Z47.1 AFTERCARE FOLLOWING LEFT KNEE JOINT REPLACEMENT SURGERY: Primary | ICD-10-CM

## 2021-08-10 DIAGNOSIS — R26.2 DIFFICULTY WALKING: ICD-10-CM

## 2021-08-10 DIAGNOSIS — Z96.652 AFTERCARE FOLLOWING LEFT KNEE JOINT REPLACEMENT SURGERY: Primary | ICD-10-CM

## 2021-08-10 DIAGNOSIS — M25.562 ACUTE PAIN OF LEFT KNEE: ICD-10-CM

## 2021-08-10 PROCEDURE — 97110 THERAPEUTIC EXERCISES: CPT | Performed by: PHYSICAL THERAPIST

## 2021-08-10 PROCEDURE — 97140 MANUAL THERAPY 1/> REGIONS: CPT | Performed by: PHYSICAL THERAPIST

## 2021-08-10 NOTE — PROGRESS NOTES
Progress note:    Patient: Beth Mullins   : 1975  Diagnosis/ICD-10 Code:  Aftercare following left knee joint replacement surgery [Z47.1, Z96.652]  Referring practitioner: Don Acuna MD  Date of Initial Visit: Type: THERAPY  Noted: 2021  Today's Date: 8/10/2021  Patient seen for 9 sessions    Progress note due: 2021  Re-cert due: 2021      Subjective:   Beth Mullins reports: 4/10 L knee pain at beginning of session today. Pt is ambulating without AD but states that she still has pain and difficulty with prolonged walking, standing and other ADLs. Patient states that she continues to have intermittent left knee swelling, which has improved over the past month.  Subjective Questionnaire: LEFS: 40  Clinical Progress: improved  Home Program Compliance: Yes  Treatment has included: therapeutic exercise and manual therapy    Subjective   Objective          Active Range of Motion   Left Knee   Flexion: 93 degrees with pain  Extension: 10 (from 0) degrees with pain    Passive Range of Motion   Left Knee   Flexion: 96 degrees   Extension: 5 degrees     Strength/Myotome Testing     Left Hip   Planes of Motion   Flexion: 4-  Extension: 4-  Abduction: 4-    Right Hip   Planes of Motion   Flexion: 4  Extension: 4-  Abduction: 4-    Left Knee   Flexion: 4-  Extension: 4-  Quadriceps contraction: fair    Right Knee   Flexion: 4+  Extension: 4+    Left Ankle/Foot   Dorsiflexion: 4+    Right Ankle/Foot   Dorsiflexion: 4+      Assessment & Plan     Assessment  Impairments: abnormal gait, abnormal muscle firing, abnormal or restricted ROM, activity intolerance, impaired balance, impaired physical strength, lacks appropriate home exercise program, pain with function, safety issue and weight-bearing intolerance  Assessment details: Patient demonstrates improvements in left knee strength, ROM and reports of pain but still with significant deficts limiting function as shown on the LEFS. Patient would  continue to benefit from skilled PT services in order to address remaining deficits limiting function at this time.     Prognosis: good  Functional Limitations: walking, uncomfortable because of pain, moving in bed, standing and stooping  Plan  Therapy options: will be seen for skilled physical therapy services  Planned modality interventions: cryotherapy, electrical stimulation/Russian stimulation and TENS  Planned therapy interventions: balance/weight-bearing training, ADL retraining, soft tissue mobilization, strengthening, stretching, therapeutic activities, joint mobilization, home exercise program, gait training, functional ROM exercises, flexibility, body mechanics training, postural training, neuromuscular re-education and manual therapy  Frequency: 2x week  Duration in weeks: 8  Treatment plan discussed with: patient      Progress toward previous goals: Progressing    Goals: 1. The patient has limited ROM of the left knee.              LTG 1: 12 weeks:  The patient will demonstrate 0 to 120 degrees of ROM for the left knee in order to allow patient to complete prolonged walking, standing, stairs and other ADLs with decreased pain/difficulty.                          STATUS:  Ongoing              STG 1a:  6 weeks:  The patient will demonstrate 5 to 105 degrees of ROM for the left knee.                          STATUS:  Ongoing              TREATMENT: Manual therapy, therapeutic exercise, home exercise instruction, and modalities as needed to include:  moist heat, electrical stimulation, ultrasound, and ice.    2. The patient has limited strength of the left knee.              LTG 2: 12 weeks: The patient will demonstrate 4+/5 strength for left knee flexion and extension in order to allow patient improved joint stability                          STATUS:  Ongoing              STG 2a: 6 weeks: The patient will demonstrate 4/5 strength for left knee flexion and extension                          STATUS:   Ongoing              TREATMENT: Manual therapy, therapeutic exercise, home exercise instruction, aquatic therapy, and modalities as needed to include:  moist heat, electrical stimulation, ultrasound, and ice.                3. The patient has gait dysfunction.              LTG 3: 12 weeks:  The patient will ambulate without assistive device, independently, for community distances with minimal limp to the left lower extremity in order to improve mobility and allow patient to perform activities such as grocery shopping with greater ease.                          STATUS:  Ongoing              TREATMENT: Gait training, aquatic therapy, therapeutic exercise, and home exercise instruction.    4. Mobility: Walking/Moving Around Functional Limitation                               LTG 4: 12 weeks:  The patient will demonstrate 1-19 % limitation by achieving a score of 65 on the LEFS.                          STATUS:  Ongoing              STG 4 a: 6 weeks:  The patient will demonstrate 20-39 % limitation by achieving a score of 50 on the LEFS.                            STATUS:  Ongoing              TREATMENT:  Manual therapy, therapeutic exercise, home exercise instruction.       Recommendations: Continue as planned  Timeframe: 2 months  Prognosis to achieve goals: good    PT SIGNATURE: Jennifer Sloan, PT   DATE TREATMENT INITIATED: 8/10/2021  Certification Period: 8/10/2021 - 11/8/2021      Based upon review of the patient's progress and continued therapy plan, it is my medical opinion that Beth Mullins should continue physical therapy treatment at [facility]  [address] [place] [address] [id number] [phone].    Signature: __________________________________  Been, Don STRATTNO MD    Timed:  Manual Therapy:    15     mins  78673;  Therapeutic Exercise:    25     mins  61771;     Neuromuscular Jatin:    0    mins  19834;    Therapeutic Activity:     0     mins  91340;      Gait Trainin     mins  65751;     Ultrasound:     0     mins  10798;    Electrical Stimulation:    0     mins  38857 ( );    Untimed:  Electrical Stimulation:    0     mins  80776 ( );  Mechanical Traction:    0     mins  79099;     Timed Treatment:   40   mins   Total Treatment:     45   mins

## 2021-08-12 ENCOUNTER — TREATMENT (OUTPATIENT)
Dept: PHYSICAL THERAPY | Facility: CLINIC | Age: 46
End: 2021-08-12

## 2021-08-12 DIAGNOSIS — R26.2 DIFFICULTY WALKING: ICD-10-CM

## 2021-08-12 DIAGNOSIS — Z96.652 PRESENCE OF LEFT ARTIFICIAL KNEE JOINT: ICD-10-CM

## 2021-08-12 DIAGNOSIS — Z96.652 AFTERCARE FOLLOWING LEFT KNEE JOINT REPLACEMENT SURGERY: Primary | ICD-10-CM

## 2021-08-12 DIAGNOSIS — Z47.1 AFTERCARE FOLLOWING LEFT KNEE JOINT REPLACEMENT SURGERY: Primary | ICD-10-CM

## 2021-08-12 DIAGNOSIS — M25.562 ACUTE PAIN OF LEFT KNEE: ICD-10-CM

## 2021-08-12 PROCEDURE — 97110 THERAPEUTIC EXERCISES: CPT | Performed by: PHYSICAL THERAPIST

## 2021-08-12 PROCEDURE — 97140 MANUAL THERAPY 1/> REGIONS: CPT | Performed by: PHYSICAL THERAPIST

## 2021-08-12 NOTE — PROGRESS NOTES
Physical Therapy Daily Progress Note        Patient: Beth Mullins   : 1975  Diagnosis/ICD-10 Code:  Aftercare following left knee joint replacement surgery [Z47.1, Z96.652]  Referring practitioner: Don Acuna MD  Date of Initial Visit: Type: THERAPY  Noted: 2021  Today's Date: 2021  Patient seen for 10 sessions             Subjective   Beth Mullins reports: knee hurting and feeling more swollen since last PT session. States that the numbness on the inside of her legs came back.     Objective   Knee Flexion: 101 deg c heel slides  Knee Extension: -1 deg c heel prop    See Exercise, Manual, and Modality Logs for complete treatment.       Assessment/Plan  Beth still experiencing increased L knee pain, even numb on the inside of the knee. Pt had some increased discomfort with end range knee flexion. Pt able to achieve -1 deg of knee extension and 101 deg of knee flexion. Pt would benefit from skilled PT to address Range of Motion  and Strength deficits, pain management and any concerns with ADLs.     Progress per Plan of Care           Timed:  Manual Therapy:    15     mins  86468;  Therapeutic Exercise:    25     mins  59110;     Neuromuscular Jatin:        mins  39438;    Therapeutic Activity:          mins  19911;     Gait Training:           mins  02390;       Untimed:  Electrical Stimulation:         mins  40474 ( );  Mechanical Traction:         mins  02503;     Ice Pack:   15      mins      Timed Treatment:   40   mins   Total Treatment:     55   mins        Shwetha Jon PTA  Physical Therapist Assistant   Yes

## 2021-08-13 DIAGNOSIS — M17.12 PRIMARY OSTEOARTHRITIS OF LEFT KNEE: ICD-10-CM

## 2021-08-13 DIAGNOSIS — Z96.652 S/P TOTAL KNEE ARTHROPLASTY, LEFT: ICD-10-CM

## 2021-08-13 RX ORDER — HYDROCODONE BITARTRATE AND ACETAMINOPHEN 7.5; 325 MG/1; MG/1
1 TABLET ORAL EVERY 4 HOURS PRN
Qty: 42 TABLET | Refills: 0 | Status: SHIPPED | OUTPATIENT
Start: 2021-08-13 | End: 2021-08-25 | Stop reason: SDUPTHER

## 2021-08-16 ENCOUNTER — TREATMENT (OUTPATIENT)
Dept: PHYSICAL THERAPY | Facility: CLINIC | Age: 46
End: 2021-08-16

## 2021-08-16 DIAGNOSIS — Z96.652 AFTERCARE FOLLOWING LEFT KNEE JOINT REPLACEMENT SURGERY: Primary | ICD-10-CM

## 2021-08-16 DIAGNOSIS — M25.562 ACUTE PAIN OF LEFT KNEE: ICD-10-CM

## 2021-08-16 DIAGNOSIS — Z47.1 AFTERCARE FOLLOWING LEFT KNEE JOINT REPLACEMENT SURGERY: Primary | ICD-10-CM

## 2021-08-16 DIAGNOSIS — Z96.652 PRESENCE OF LEFT ARTIFICIAL KNEE JOINT: ICD-10-CM

## 2021-08-16 DIAGNOSIS — R26.2 DIFFICULTY WALKING: ICD-10-CM

## 2021-08-16 PROCEDURE — 97110 THERAPEUTIC EXERCISES: CPT | Performed by: PHYSICAL THERAPIST

## 2021-08-16 PROCEDURE — 97140 MANUAL THERAPY 1/> REGIONS: CPT | Performed by: PHYSICAL THERAPIST

## 2021-08-16 NOTE — PROGRESS NOTES
Physical Therapy Daily Progress Note        Patient: Beth Mullins   : 1975  Diagnosis/ICD-10 Code:  Aftercare following left knee joint replacement surgery [Z47.1, Z96.652]  Referring practitioner: Don Acuna MD  Date of Initial Visit: Type: THERAPY  Noted: 2021  Today's Date: 2021  Patient seen for 11 sessions             Subjective   Beth Mullins reports: 4/10 pain in knee, states that she might have overdone it the last week or so.     Objective   Knee Flexion: 103 deg c step stretch   Knee Extension: 0 deg c heel prop    See Exercise, Manual, and Modality Logs for complete treatment.       Assessment/Plan  Beth still experiencing increased L knee pain, especially after a busy week last week. Pt had some increased discomfort with end range knee flexion. Pt able to achieve 0 deg of knee extension and 103 deg of knee flexion. Pt would benefit from skilled PT to address Range of Motion  and Strength deficits, pain management and any concerns with ADLs.     Progress per Plan of Care           Timed:  Manual Therapy:    15     mins  08466;  Therapeutic Exercise:    25     mins  12906;     Neuromuscular Jatin:        mins  90434;    Therapeutic Activity:          mins  02633;     Gait Training:           mins  92245;       Untimed:  Electrical Stimulation:         mins  61536 ( );  Mechanical Traction:         mins  27682;       Timed Treatment:   40   mins   Total Treatment:     40   mins        Shwetha Jon PTA  Physical Therapist Assistant

## 2021-08-19 ENCOUNTER — TELEPHONE (OUTPATIENT)
Dept: PHYSICAL THERAPY | Facility: CLINIC | Age: 46
End: 2021-08-19

## 2021-08-25 DIAGNOSIS — M17.12 PRIMARY OSTEOARTHRITIS OF LEFT KNEE: ICD-10-CM

## 2021-08-25 DIAGNOSIS — Z96.652 S/P TOTAL KNEE ARTHROPLASTY, LEFT: ICD-10-CM

## 2021-08-25 RX ORDER — HYDROCODONE BITARTRATE AND ACETAMINOPHEN 7.5; 325 MG/1; MG/1
1 TABLET ORAL EVERY 6 HOURS PRN
Qty: 28 TABLET | Refills: 0 | Status: SHIPPED | OUTPATIENT
Start: 2021-08-25 | End: 2021-08-31 | Stop reason: SDUPTHER

## 2021-08-26 ENCOUNTER — TREATMENT (OUTPATIENT)
Dept: PHYSICAL THERAPY | Facility: CLINIC | Age: 46
End: 2021-08-26

## 2021-08-26 DIAGNOSIS — M25.562 ACUTE PAIN OF LEFT KNEE: ICD-10-CM

## 2021-08-26 DIAGNOSIS — Z96.652 AFTERCARE FOLLOWING LEFT KNEE JOINT REPLACEMENT SURGERY: Primary | ICD-10-CM

## 2021-08-26 DIAGNOSIS — Z47.1 AFTERCARE FOLLOWING LEFT KNEE JOINT REPLACEMENT SURGERY: Primary | ICD-10-CM

## 2021-08-26 DIAGNOSIS — R26.2 DIFFICULTY WALKING: ICD-10-CM

## 2021-08-26 DIAGNOSIS — Z96.652 PRESENCE OF LEFT ARTIFICIAL KNEE JOINT: ICD-10-CM

## 2021-08-26 PROCEDURE — 97140 MANUAL THERAPY 1/> REGIONS: CPT | Performed by: PHYSICAL THERAPIST

## 2021-08-26 PROCEDURE — 97110 THERAPEUTIC EXERCISES: CPT | Performed by: PHYSICAL THERAPIST

## 2021-08-26 NOTE — PROGRESS NOTES
Physical Therapy Daily Progress Note        Patient: Beth Mullins   : 1975  Diagnosis/ICD-10 Code:  Aftercare following left knee joint replacement surgery [Z47.1, Z96.652]  Referring practitioner: Don Acuna MD  Date of Initial Visit: Type: THERAPY  Noted: 2021  Today's Date: 2021  Patient seen for 12 sessions             Subjective   Beth Mullins reports: knee feeling pretty good, feels like it's less swollen. 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     Objective   Knee Flexion: 110 deg c step stretch     See Exercise, Manual, and Modality Logs for complete treatment.       Assessment/Plan  Beth progressing as evident by decreased overall knee pain. Pt tolerated exercises well, no complaints of increased pain or discomfort. Pt would benefit from skilled PT to address Range of Motion  and Strength deficits, pain management and any concerns with ADLs.       Progress per Plan of Care           Timed:  Manual Therapy:    15     mins  42959;  Therapeutic Exercise:    25     mins  08362;     Neuromuscular Jatin:        mins  25027;    Therapeutic Activity:          mins  88187;     Gait Training:           mins  48476;       Untimed:  Electrical Stimulation:         mins  08593 ( );  Mechanical Traction:         mins  62332;       Timed Treatment:   40   mins   Total Treatment:     40   mins        Shwetha Jon PTA  Physical Therapist Assistant

## 2021-08-30 ENCOUNTER — OFFICE VISIT (OUTPATIENT)
Dept: ORTHOPEDIC SURGERY | Facility: CLINIC | Age: 46
End: 2021-08-30

## 2021-08-30 ENCOUNTER — TREATMENT (OUTPATIENT)
Dept: PHYSICAL THERAPY | Facility: CLINIC | Age: 46
End: 2021-08-30

## 2021-08-30 VITALS — OXYGEN SATURATION: 96 % | BODY MASS INDEX: 44.17 KG/M2 | HEART RATE: 90 BPM | WEIGHT: 225 LBS | HEIGHT: 60 IN

## 2021-08-30 DIAGNOSIS — Z96.652 S/P TOTAL KNEE ARTHROPLASTY, LEFT: Primary | ICD-10-CM

## 2021-08-30 DIAGNOSIS — Z47.1 AFTERCARE FOLLOWING LEFT KNEE JOINT REPLACEMENT SURGERY: Primary | ICD-10-CM

## 2021-08-30 DIAGNOSIS — Z96.652 PRESENCE OF LEFT ARTIFICIAL KNEE JOINT: ICD-10-CM

## 2021-08-30 DIAGNOSIS — M25.562 ACUTE PAIN OF LEFT KNEE: ICD-10-CM

## 2021-08-30 DIAGNOSIS — R26.2 DIFFICULTY WALKING: ICD-10-CM

## 2021-08-30 DIAGNOSIS — Z96.652 AFTERCARE FOLLOWING LEFT KNEE JOINT REPLACEMENT SURGERY: Primary | ICD-10-CM

## 2021-08-30 PROCEDURE — 97140 MANUAL THERAPY 1/> REGIONS: CPT | Performed by: PHYSICAL THERAPIST

## 2021-08-30 PROCEDURE — 97110 THERAPEUTIC EXERCISES: CPT | Performed by: PHYSICAL THERAPIST

## 2021-08-30 PROCEDURE — 99024 POSTOP FOLLOW-UP VISIT: CPT | Performed by: PHYSICIAN ASSISTANT

## 2021-08-30 NOTE — PROGRESS NOTES
Physical Therapy Daily Progress Note        Patient: Beth Mullins   : 1975  Diagnosis/ICD-10 Code:  Aftercare following left knee joint replacement surgery [Z47.1, Z96.652]  Referring practitioner: Don Acuna MD  Date of Initial Visit: Type: THERAPY  Noted: 2021  Today's Date: 2021  Patient seen for 13 sessions             Subjective   Beth Mullins reports: walking around the grocery store yesterday and could feel the knee getting tighter; feels a popping in the back of her knee. States that her L hip is really starting to bother her more, possible from changing her gait pattern.       Currently: 6/10  Worse: 8-9/10 yesterday after walking around the grocery store.  Best: 1-2/10, an hour or 2 after getting up in the morning and moving.     Objective   Knee Flexion: 105 deg with step stretch  Knee Extension: 0 deg c heel prop    See Exercise, Manual, and Modality Logs for complete treatment.       Assessment/Plan  Beth progressing as evident by decreased overall knee pain. Pt able to achieve 0 deg of knee extension and 105 deg of knee flexion. Pt would benefit from skilled PT to address Range of Motion  and Strength deficits, pain management and any concerns with ADLs.     Progress per Plan of Care           Timed:  Manual Therapy:    10     mins  24862;  Therapeutic Exercise:    30     mins  62201;     Neuromuscular Jatin:        mins  08341;    Therapeutic Activity:          mins  83400;     Gait Training:           mins  05004;       Untimed:  Electrical Stimulation:         mins  41890 ( );  Mechanical Traction:         mins  12766;       Timed Treatment:   40   mins   Total Treatment:     50   mins        Shwetha Jon PTA  Physical Therapist Assistant

## 2021-08-30 NOTE — PATIENT INSTRUCTIONS
Use ice and elevation for associated swelling  Continue with physical therapy. Progressing ROM and strength exercises.   Advised patient on falls precautions  Follow up in 6 weeks, repeat films.

## 2021-08-30 NOTE — PROGRESS NOTES
"Chief Complaint  Pain of the Left Knee    Subjective          Beth Mullins presents to [facility] for s/p left total knee arthroplasty on 07/13/21 by Dr. Acuna. Patient states she has good and bad days. She reports having swelling of her knee still. She states she is progressing her way up to walking one mile. She walks nightly and states she has some popping while she walks.   She notes improvement since her last visit. She is currently doing physical therapy twice weekly.    Objective   Vital Signs:   Pulse 90   Ht 151.1 cm (59.5\")   Wt 102 kg (225 lb)   SpO2 96%   BMI 44.68 kg/m²       Physical Exam  Constitutional:       Appearance: Normal appearance. Patient is well-developed and normal weight.   HENT:      Head: Normocephalic.      Right Ear: Hearing and external ear normal.      Left Ear: Hearing and external ear normal.      Nose: Nose normal.   Eyes:      Conjunctiva/sclera: Conjunctivae normal.   Cardiovascular:      Rate and Rhythm: Normal rate.   Pulmonary:      Effort: Pulmonary effort is normal.      Breath sounds: No wheezing or rales.   Abdominal:      Palpations: Abdomen is soft.      Tenderness: There is no abdominal tenderness.   Musculoskeletal:      Cervical back: Normal range of motion.   Skin:     Findings: No rash.   Neurological:      Mental Status: Patient is alert and oriented to person, place, and time.   Psychiatric:         Mood and Affect: Mood and affect normal.         Judgment: Judgment normal.     Ortho Exam  Left knee: Scar is well-healed.  Tenderness and swelling of medial aspect of knee.  Mild tenderness of the lateral joint line.  Sensation is intact.  Neurovascular intact.  Posterior tibialis pulse 2+.  Dorsalis pedis pulse 2+.  Calf is soft and nontender.  Negative Homans.  AROM is 0 to 110 degrees of flexion.  Stable to varus and valgus stress.  Patella is well tracking.  Normal plantar dorsiflexion.  Good muscle tone of the quadriceps, " hamstrings, ankle flexors.    Result Review :   The following data was reviewed by: MARCO ANTONIO Tracey on 08/30/2021:         Imaging Results (Most Recent)     None                Assessment and Plan    Problem List Items Addressed This Visit        Musculoskeletal and Injuries    S/P total knee arthroplasty, left - Primary          Follow Up   Return in about 6 weeks (around 10/11/2021).  Patient Instructions   Use ice and elevation for associated swelling  Continue with physical therapy. Progressing ROM and strength exercises.   Advised patient on falls precautions  Follow up in 6 weeks, repeat films.    Patient was given instructions and counseling regarding her condition or for health maintenance advice. Please see specific information pulled into the AVS if appropriate.

## 2021-08-31 DIAGNOSIS — M17.12 PRIMARY OSTEOARTHRITIS OF LEFT KNEE: ICD-10-CM

## 2021-08-31 DIAGNOSIS — Z96.652 S/P TOTAL KNEE ARTHROPLASTY, LEFT: ICD-10-CM

## 2021-09-01 RX ORDER — HYDROCODONE BITARTRATE AND ACETAMINOPHEN 7.5; 325 MG/1; MG/1
1 TABLET ORAL EVERY 6 HOURS PRN
Qty: 28 TABLET | Refills: 0 | Status: SHIPPED | OUTPATIENT
Start: 2021-09-01 | End: 2021-09-07 | Stop reason: SDUPTHER

## 2021-09-02 ENCOUNTER — TREATMENT (OUTPATIENT)
Dept: PHYSICAL THERAPY | Facility: CLINIC | Age: 46
End: 2021-09-02

## 2021-09-02 DIAGNOSIS — Z96.652 PRESENCE OF LEFT ARTIFICIAL KNEE JOINT: ICD-10-CM

## 2021-09-02 DIAGNOSIS — Z47.1 AFTERCARE FOLLOWING LEFT KNEE JOINT REPLACEMENT SURGERY: Primary | ICD-10-CM

## 2021-09-02 DIAGNOSIS — R26.2 DIFFICULTY WALKING: ICD-10-CM

## 2021-09-02 DIAGNOSIS — Z96.652 AFTERCARE FOLLOWING LEFT KNEE JOINT REPLACEMENT SURGERY: Primary | ICD-10-CM

## 2021-09-02 DIAGNOSIS — M25.562 ACUTE PAIN OF LEFT KNEE: ICD-10-CM

## 2021-09-02 PROCEDURE — 97110 THERAPEUTIC EXERCISES: CPT | Performed by: PHYSICAL THERAPIST

## 2021-09-02 PROCEDURE — 97140 MANUAL THERAPY 1/> REGIONS: CPT | Performed by: PHYSICAL THERAPIST

## 2021-09-02 NOTE — PROGRESS NOTES
Physical Therapy Daily Progress Note        Patient: Beth Mullins   : 1975  Diagnosis/ICD-10 Code:  Aftercare following left knee joint replacement surgery [Z47.1, Z96.652]  Referring practitioner: Don Acuna MD  Date of Initial Visit: Type: THERAPY  Noted: 2021  Today's Date: 2021  Patient seen for 14 sessions             Subjective   Beth Mullins reports: knee being extremely stiff in the mornings, once she is up and moving the pain stays at about a 3-4/10. It will get stiff again if she is up and moving, using     Objective   Knee Flexion: 106deg c step stretch   Knee Extension: 0deg c heel prop    See Exercise, Manual, and Modality Logs for complete treatment.       Assessment/Plan  Beth still experiencing increased L knee pain, especially first thing in the morning. Pt had some increased discomfort with end range knee flexion. Pt able to achieve 0 deg of knee extension and 106 deg of knee flexion. Pt would benefit from skilled PT to address Range of Motion  and Strength deficits, pain management and any concerns with ADLs.     Progress per Plan of Care           Timed:  Manual Therapy:    10     mins  40475;  Therapeutic Exercise:    30     mins  91813;     Neuromuscular Jatin:        mins  08789;    Therapeutic Activity:          mins  93854;     Gait Training:           mins  03997;    Aquatic Therapy:          mins  27539;       Untimed:  Electrical Stimulation:         mins  88520 ( );  Mechanical Traction:         mins  50997;       Timed Treatment:   40   mins   Total Treatment:     40   mins        Shwetha Jon PTA  Physical Therapist Assistant

## 2021-09-07 ENCOUNTER — TREATMENT (OUTPATIENT)
Dept: PHYSICAL THERAPY | Facility: CLINIC | Age: 46
End: 2021-09-07

## 2021-09-07 DIAGNOSIS — Z96.652 AFTERCARE FOLLOWING LEFT KNEE JOINT REPLACEMENT SURGERY: Primary | ICD-10-CM

## 2021-09-07 DIAGNOSIS — R26.2 DIFFICULTY WALKING: ICD-10-CM

## 2021-09-07 DIAGNOSIS — Z47.1 AFTERCARE FOLLOWING LEFT KNEE JOINT REPLACEMENT SURGERY: Primary | ICD-10-CM

## 2021-09-07 DIAGNOSIS — Z96.652 PRESENCE OF LEFT ARTIFICIAL KNEE JOINT: ICD-10-CM

## 2021-09-07 DIAGNOSIS — M17.12 PRIMARY OSTEOARTHRITIS OF LEFT KNEE: ICD-10-CM

## 2021-09-07 DIAGNOSIS — M25.562 ACUTE PAIN OF LEFT KNEE: ICD-10-CM

## 2021-09-07 DIAGNOSIS — Z96.652 S/P TOTAL KNEE ARTHROPLASTY, LEFT: ICD-10-CM

## 2021-09-07 PROCEDURE — 97110 THERAPEUTIC EXERCISES: CPT | Performed by: PHYSICAL THERAPIST

## 2021-09-07 PROCEDURE — 97140 MANUAL THERAPY 1/> REGIONS: CPT | Performed by: PHYSICAL THERAPIST

## 2021-09-07 NOTE — PROGRESS NOTES
Physical Therapy Daily Progress Note        Patient: Beth Mullins   : 1975  Diagnosis/ICD-10 Code:  Aftercare following left knee joint replacement surgery [Z47.1, Z96.652]  Referring practitioner: Don Acuna MD  Date of Initial Visit: Type: THERAPY  Noted: 2021  Today's Date: 2021  Patient seen for 15 sessions             Subjective   Beth Mullins reports: 3/10 L knee pain at beginning of session today.    Objective   Left knee AROM: 0-4-105  See Exercise, Manual, and Modality Logs for complete treatment.       Assessment/Plan  Patient tolerated all exercise progressions and manual therapy well today but continues to demonstrate strength/ROM deficits limiting function. Continue to progress per patient tolerance.    Progress per Plan of Care           Timed:  Manual Therapy:    15     mins  80062;  Therapeutic Exercise:    25     mins  84958;     Neuromuscular Jatin:    0    mins  40641;    Therapeutic Activity:     0     mins  41478;     Gait Trainin     mins  30233;     Ultrasound:     0     mins  13320;    Electrical Stimulation:    0     mins  85191;  Iontophoresis     0     mins  16340    Untimed:  Electrical Stimulation:    0     mins  17209 ( );  Mechanical Traction:    0     mins  46615;   Fluidotherapy     0     mins  34794       Timed Treatment:   40   mins   Total Treatment:     40   mins        Jennifer Sloan PT  Physical Therapist

## 2021-09-08 RX ORDER — HYDROCODONE BITARTRATE AND ACETAMINOPHEN 7.5; 325 MG/1; MG/1
1 TABLET ORAL EVERY 6 HOURS PRN
Qty: 28 TABLET | Refills: 0 | Status: SHIPPED | OUTPATIENT
Start: 2021-09-08 | End: 2021-09-15 | Stop reason: SDUPTHER

## 2021-09-09 ENCOUNTER — TREATMENT (OUTPATIENT)
Dept: PHYSICAL THERAPY | Facility: CLINIC | Age: 46
End: 2021-09-09

## 2021-09-09 DIAGNOSIS — Z47.1 AFTERCARE FOLLOWING LEFT KNEE JOINT REPLACEMENT SURGERY: Primary | ICD-10-CM

## 2021-09-09 DIAGNOSIS — R26.2 DIFFICULTY WALKING: ICD-10-CM

## 2021-09-09 DIAGNOSIS — Z96.652 PRESENCE OF LEFT ARTIFICIAL KNEE JOINT: ICD-10-CM

## 2021-09-09 DIAGNOSIS — M25.562 ACUTE PAIN OF LEFT KNEE: ICD-10-CM

## 2021-09-09 DIAGNOSIS — Z96.652 AFTERCARE FOLLOWING LEFT KNEE JOINT REPLACEMENT SURGERY: Primary | ICD-10-CM

## 2021-09-09 PROCEDURE — 97110 THERAPEUTIC EXERCISES: CPT | Performed by: PHYSICAL THERAPIST

## 2021-09-09 PROCEDURE — 97140 MANUAL THERAPY 1/> REGIONS: CPT | Performed by: PHYSICAL THERAPIST

## 2021-09-09 NOTE — PROGRESS NOTES
Progress note    Patient: Beth Mullins   : 1975  Diagnosis/ICD-10 Code:  Aftercare following left knee joint replacement surgery [Z47.1, Z96.652]  Referring practitioner: Don Acuna MD  Date of Initial Visit: Type: THERAPY  Noted: 2021  Today's Date: 2021  Patient seen for 16 sessions    Progress note due: 10/9/2021  Re-cert due: 2021      Subjective:   Beth Mullins reports: 2/10 L knee pain at beginning of session today.  Patient reports that she has noticed some improvements in overall pain and function during ADLs but continues to have difficulty with prolonged walking, standing and stairs due to pain, stiffness and weakness.   Subjective Questionnaire: LEFS: 40  Clinical Progress: improved  Home Program Compliance: Yes  Treatment has included: therapeutic exercise, manual therapy and cryotherapy    Subjective   Objective          Active Range of Motion   Left Knee   Flexion: 109 degrees with pain  Extension: 4 (from 0) degrees with pain    Passive Range of Motion   Left Knee   Flexion: 113 degrees   Extension: 0 degrees     Strength/Myotome Testing     Left Hip   Planes of Motion   Flexion: 4  Extension: 4-  Abduction: 4-    Right Hip   Planes of Motion   Flexion: 4  Extension: 4-  Abduction: 4-    Left Knee   Flexion: 4-  Extension: 4  Quadriceps contraction: fair    Right Knee   Flexion: 4+  Extension: 4+    Left Ankle/Foot   Dorsiflexion: 4+    Right Ankle/Foot   Dorsiflexion: 4+      Assessment & Plan     Assessment  Impairments: abnormal gait, abnormal muscle firing, abnormal or restricted ROM, activity intolerance, impaired balance, impaired physical strength, lacks appropriate home exercise program, pain with function, safety issue and weight-bearing intolerance  Assessment details: Patient demonstrates improvements in L knee strength and ROM but still with deficits and reports of pain/swelling limiting function as shown on the LEFS. Patient is limited in further  progression with L knee PROM due to soft tissue tightness and muscle guarding. Patient would continue to benefit from skilled PT services in order to address remaining deficits limiting function at this time.      Prognosis: good  Functional Limitations: walking, uncomfortable because of pain, moving in bed, standing and stooping  Goals  Plan Goals: 1. The patient has limited ROM of the left knee.              LTG 1: 12 weeks:  The patient will demonstrate 0 to 120 degrees of ROM for the left knee in order to allow patient to complete prolonged walking, standing, stairs and other ADLs with decreased pain/difficulty.                          STATUS:  Ongoing              STG 1a:  6 weeks:  The patient will demonstrate 5 to 105 degrees of ROM for the left knee.                          STATUS:  MET              TREATMENT: Manual therapy, therapeutic exercise, home exercise instruction, and modalities as needed to include:  moist heat, electrical stimulation, ultrasound, and ice.    2. The patient has limited strength of the left knee.              LTG 2: 12 weeks: The patient will demonstrate 4+/5 strength for left knee flexion and extension in order to allow patient improved joint stability                          STATUS:  Ongoing              STG 2a: 6 weeks: The patient will demonstrate 4/5 strength for left knee flexion and extension                          STATUS:  partially met, continue              TREATMENT: Manual therapy, therapeutic exercise, home exercise instruction, aquatic therapy, and modalities as needed to include:  moist heat, electrical stimulation, ultrasound, and ice.                3. The patient has gait dysfunction.              LTG 3: 12 weeks:  The patient will ambulate without assistive device, independently, for community distances with minimal limp to the left lower extremity in order to improve mobility and allow patient to perform activities such as grocery shopping with greater  ease.                          STATUS:  Ongoing              TREATMENT: Gait training, aquatic therapy, therapeutic exercise, and home exercise instruction.    4. Mobility: Walking/Moving Around Functional Limitation                               LTG 4: 12 weeks:  The patient will demonstrate 1-19 % limitation by achieving a score of 65 on the LEFS.                          STATUS:  Ongoing              STG 4 a: 6 weeks:  The patient will demonstrate 20-39 % limitation by achieving a score of 50 on the LEFS.                            STATUS:  not met, continue              TREATMENT:  Manual therapy, therapeutic exercise, home exercise instruction.     Plan  Therapy options: will be seen for skilled physical therapy services  Planned modality interventions: cryotherapy, electrical stimulation/Russian stimulation and TENS  Planned therapy interventions: balance/weight-bearing training, ADL retraining, soft tissue mobilization, strengthening, stretching, therapeutic activities, joint mobilization, home exercise program, gait training, functional ROM exercises, flexibility, body mechanics training, postural training, neuromuscular re-education and manual therapy  Frequency: 3x week  Duration in weeks: 4  Treatment plan discussed with: patient      Progress toward previous goals: Progressing towards        Recommendations: Continue as planned  Timeframe: 1 month  Prognosis to achieve goals: good    PT SIGNATURE: Jennifer Sloan, PT   DATE TREATMENT INITIATED: 9/9/2021  Certification Period: 9/9/2021 - 12/8/2021      Based upon review of the patient's progress and continued therapy plan, it is my medical opinion that Beth Mullins should continue physical therapy treatment at [facility]  [address] [place] [id number]  [phone].    Signature: __________________________________  BeenDon MD    Timed:  Manual Therapy:    12     mins  35696;  Therapeutic  Exercise:    25     mins  17714;     Neuromuscular Jatin:    0    mins  25637;    Therapeutic Activity:     0     mins  50927;     Gait Trainin     mins  45176;     Ultrasound:     0     mins  95855;    Electrical Stimulation:    0     mins  36046 ( );    Untimed:  Electrical Stimulation:    0     mins  61780 ( );  Mechanical Traction:    0     mins  25117;     Timed Treatment:   37   mins   Total Treatment:     43   mins

## 2021-09-15 ENCOUNTER — TREATMENT (OUTPATIENT)
Dept: PHYSICAL THERAPY | Facility: CLINIC | Age: 46
End: 2021-09-15

## 2021-09-15 DIAGNOSIS — Z96.652 PRESENCE OF LEFT ARTIFICIAL KNEE JOINT: ICD-10-CM

## 2021-09-15 DIAGNOSIS — M17.12 PRIMARY OSTEOARTHRITIS OF LEFT KNEE: ICD-10-CM

## 2021-09-15 DIAGNOSIS — Z96.652 AFTERCARE FOLLOWING LEFT KNEE JOINT REPLACEMENT SURGERY: Primary | ICD-10-CM

## 2021-09-15 DIAGNOSIS — R26.2 DIFFICULTY WALKING: ICD-10-CM

## 2021-09-15 DIAGNOSIS — Z47.1 AFTERCARE FOLLOWING LEFT KNEE JOINT REPLACEMENT SURGERY: Primary | ICD-10-CM

## 2021-09-15 DIAGNOSIS — M25.562 ACUTE PAIN OF LEFT KNEE: ICD-10-CM

## 2021-09-15 DIAGNOSIS — Z96.652 S/P TOTAL KNEE ARTHROPLASTY, LEFT: ICD-10-CM

## 2021-09-15 PROCEDURE — 97110 THERAPEUTIC EXERCISES: CPT | Performed by: PHYSICAL THERAPIST

## 2021-09-15 PROCEDURE — 97140 MANUAL THERAPY 1/> REGIONS: CPT | Performed by: PHYSICAL THERAPIST

## 2021-09-15 RX ORDER — HYDROCODONE BITARTRATE AND ACETAMINOPHEN 7.5; 325 MG/1; MG/1
1 TABLET ORAL EVERY 6 HOURS PRN
Qty: 28 TABLET | Refills: 0 | Status: SHIPPED | OUTPATIENT
Start: 2021-09-15 | End: 2021-09-22 | Stop reason: DRUGHIGH

## 2021-09-15 NOTE — PROGRESS NOTES
Physical Therapy Daily Progress Note        Patient: Beth Mullins   : 1975  Diagnosis/ICD-10 Code:  Aftercare following left knee joint replacement surgery [Z47.1, Z96.652]  Referring practitioner: No ref. provider found  Date of Initial Visit: Type: THERAPY  Noted: 2021  Today's Date: 9/15/2021  Patient seen for 17 sessions             Subjective   Beth Mullins reports: 2/10 L knee pain at beginning of session today.     Objective   L knee AROM: 0-111  See Exercise, Manual, and Modality Logs for complete treatment.       Assessment/Plan  Patient tolerated all exercise progressions and manual therapy well today but continues to demonstrate strength/ROM deficits limiting function. Continue to progress per patient tolerance.    Progress per Plan of Care           Timed:  Manual Therapy:    10     mins  75852;  Therapeutic Exercise:    20     mins  49091;     Neuromuscular Jatin:    0    mins  51335;    Therapeutic Activity:     0     mins  18405;     Gait Trainin     mins  44445;     Ultrasound:     0     mins  27318;    Electrical Stimulation:    0     mins  38309;  Iontophoresis     0     mins  15259    Untimed:  Electrical Stimulation:    0     mins  53867 ( );  Mechanical Traction:    0     mins  49779;   Fluidotherapy     0     mins  57075      Timed Treatment:   30   mins   Total Treatment:     30   mins        Jennifer Sloan PT  Physical Therapist

## 2021-09-17 ENCOUNTER — TELEPHONE (OUTPATIENT)
Dept: PHYSICAL THERAPY | Facility: CLINIC | Age: 46
End: 2021-09-17

## 2021-09-22 ENCOUNTER — TREATMENT (OUTPATIENT)
Dept: PHYSICAL THERAPY | Facility: CLINIC | Age: 46
End: 2021-09-22

## 2021-09-22 DIAGNOSIS — Z47.1 AFTERCARE FOLLOWING LEFT KNEE JOINT REPLACEMENT SURGERY: Primary | ICD-10-CM

## 2021-09-22 DIAGNOSIS — Z96.652 AFTERCARE FOLLOWING LEFT KNEE JOINT REPLACEMENT SURGERY: Primary | ICD-10-CM

## 2021-09-22 DIAGNOSIS — Z96.652 PRESENCE OF LEFT ARTIFICIAL KNEE JOINT: ICD-10-CM

## 2021-09-22 DIAGNOSIS — M25.562 ACUTE PAIN OF LEFT KNEE: ICD-10-CM

## 2021-09-22 DIAGNOSIS — Z96.652 S/P TOTAL KNEE ARTHROPLASTY, LEFT: Primary | ICD-10-CM

## 2021-09-22 DIAGNOSIS — R26.2 DIFFICULTY WALKING: ICD-10-CM

## 2021-09-22 PROCEDURE — 97110 THERAPEUTIC EXERCISES: CPT | Performed by: PHYSICAL THERAPIST

## 2021-09-22 PROCEDURE — 97140 MANUAL THERAPY 1/> REGIONS: CPT | Performed by: PHYSICAL THERAPIST

## 2021-09-22 RX ORDER — TRAMADOL HYDROCHLORIDE 50 MG/1
50 TABLET ORAL EVERY 6 HOURS PRN
Qty: 28 TABLET | Refills: 0 | Status: SHIPPED | OUTPATIENT
Start: 2021-09-22 | End: 2021-09-29 | Stop reason: SDUPTHER

## 2021-09-22 NOTE — PROGRESS NOTES
Physical Therapy Daily Progress Note        Patient: Beth Mullins   : 1975  Diagnosis/ICD-10 Code:  Aftercare following left knee joint replacement surgery [Z47.1, Z96.652]  Referring practitioner: Don Acuna MD  Date of Initial Visit: Type: THERAPY  Noted: 2021  Today's Date: 2021  Patient seen for 18 sessions             Subjective   Beth Mullins reports: reports 4/10 L knee pain at beginning of session today.    Objective   Left knee AROM: 0-109  See Exercise, Manual, and Modality Logs for complete treatment.       Assessment/Plan  Patient tolerated all exercise progressions and manual therapy well today but continues to demonstrate strength/ROM deficits limiting function. Continue to progress per patient tolerance.    Progress per Plan of Care           Timed:  Manual Therapy:    10     mins  36452;  Therapeutic Exercise:    20     mins  94910;     Neuromuscular Jatin:    0    mins  47271;    Therapeutic Activity:     0     mins  52146;     Gait Trainin     mins  36353;     Ultrasound:     0     mins  97279;    Electrical Stimulation:    0     mins  30836;  Iontophoresis     0     mins  13820    Untimed:  Electrical Stimulation:    0     mins  86447 ( );  Mechanical Traction:    0     mins  34883;   Fluidotherapy     0     mins  00254  Hot pack     0     Mins    Cold pack                       0     Mins     Timed Treatment:   30   mins   Total Treatment:     30   mins        Jennifer Sloan PT  Physical Therapist

## 2021-09-29 ENCOUNTER — TREATMENT (OUTPATIENT)
Dept: PHYSICAL THERAPY | Facility: CLINIC | Age: 46
End: 2021-09-29

## 2021-09-29 DIAGNOSIS — R26.2 DIFFICULTY WALKING: ICD-10-CM

## 2021-09-29 DIAGNOSIS — Z96.652 PRESENCE OF LEFT ARTIFICIAL KNEE JOINT: ICD-10-CM

## 2021-09-29 DIAGNOSIS — M25.562 ACUTE PAIN OF LEFT KNEE: ICD-10-CM

## 2021-09-29 DIAGNOSIS — Z96.652 S/P TOTAL KNEE ARTHROPLASTY, LEFT: ICD-10-CM

## 2021-09-29 DIAGNOSIS — Z47.1 AFTERCARE FOLLOWING LEFT KNEE JOINT REPLACEMENT SURGERY: Primary | ICD-10-CM

## 2021-09-29 DIAGNOSIS — Z96.652 AFTERCARE FOLLOWING LEFT KNEE JOINT REPLACEMENT SURGERY: Primary | ICD-10-CM

## 2021-09-29 PROCEDURE — 97140 MANUAL THERAPY 1/> REGIONS: CPT | Performed by: PHYSICAL THERAPIST

## 2021-09-29 PROCEDURE — 97110 THERAPEUTIC EXERCISES: CPT | Performed by: PHYSICAL THERAPIST

## 2021-09-29 RX ORDER — TRAMADOL HYDROCHLORIDE 50 MG/1
50 TABLET ORAL EVERY 8 HOURS PRN
Qty: 21 TABLET | Refills: 0 | Status: SHIPPED | OUTPATIENT
Start: 2021-09-29 | End: 2022-05-30

## 2021-09-29 NOTE — PROGRESS NOTES
Physical Therapy Daily Progress Note        Patient: Beth Mullins   : 1975  Diagnosis/ICD-10 Code:  Aftercare following left knee joint replacement surgery [Z47.1, Z96.652]  Referring practitioner: No ref. provider found  Date of Initial Visit: Type: THERAPY  Noted: 2021  Today's Date: 2021  Patient seen for 19 sessions             Subjective   Beth Mullins reports: 6/10 L knee pain at beginning of session today.  Patient states that she has run out of pain mediation and has had increased pain since.    Objective   Left knee AROM: 0-115  See Exercise, Manual, and Modality Logs for complete treatment.       Assessment/Plan  Patient tolerated all exercise progressions and manual therapy well today but continues to demonstrate strength/ROM deficits limiting function. Continue to progress per patient tolerance.    Progress per Plan of Care            Timed:  Manual Therapy:    10     mins  53453;  Therapeutic Exercise:    35     mins  33816;     Neuromuscular Jatin:    0    mins  47655;    Therapeutic Activity:     0     mins  96117;     Gait Trainin     mins  39174;     Ultrasound:     0     mins  04300;    Electrical Stimulation:    0     mins  14326;  Iontophoresis     0     mins  26049    Untimed:  Electrical Stimulation:    0     mins  90869 (MC );  Mechanical Traction:    0     mins  65818;   Fluidotherapy     0     mins  17206    Timed Treatment:   45   mins   Total Treatment:     45   mins        Jennifer Sloan PT  Physical Therapist

## 2021-10-01 ENCOUNTER — TREATMENT (OUTPATIENT)
Dept: PHYSICAL THERAPY | Facility: CLINIC | Age: 46
End: 2021-10-01

## 2021-10-01 DIAGNOSIS — Z96.652 AFTERCARE FOLLOWING LEFT KNEE JOINT REPLACEMENT SURGERY: Primary | ICD-10-CM

## 2021-10-01 DIAGNOSIS — R26.2 DIFFICULTY WALKING: ICD-10-CM

## 2021-10-01 DIAGNOSIS — M25.562 ACUTE PAIN OF LEFT KNEE: ICD-10-CM

## 2021-10-01 DIAGNOSIS — Z96.652 PRESENCE OF LEFT ARTIFICIAL KNEE JOINT: ICD-10-CM

## 2021-10-01 DIAGNOSIS — Z47.1 AFTERCARE FOLLOWING LEFT KNEE JOINT REPLACEMENT SURGERY: Primary | ICD-10-CM

## 2021-10-01 PROCEDURE — 97110 THERAPEUTIC EXERCISES: CPT | Performed by: PHYSICAL THERAPIST

## 2021-10-01 PROCEDURE — 97140 MANUAL THERAPY 1/> REGIONS: CPT | Performed by: PHYSICAL THERAPIST

## 2021-10-01 NOTE — PROGRESS NOTES
Physical Therapy Daily Progress Note        Patient: Beth Mullins   : 1975  Diagnosis/ICD-10 Code:  Aftercare following left knee joint replacement surgery [Z47.1, Z96.652]  Referring practitioner: No ref. provider found  Date of Initial Visit: Type: THERAPY  Noted: 2021  Today's Date: 10/1/2021  Patient seen for 20 sessions             Subjective   Beth Mullins reports: 6/10 L knee pain at beginning of session today.  Pt reports that the MD gave her new pain meds which are not as effective.     Objective   Left knee AROM: 0-118  See Exercise, Manual, and Modality Logs for complete treatment.       Assessment/Plan  Patient tolerated all exercise progressions and manual therapy well today but continues to demonstrate strength/ROM deficits limiting function. Continue to progress per patient tolerance.    Progress per Plan of Care           Timed:  Manual Therapy:    15     mins  40896;  Therapeutic Exercise:    38     mins  77404;     Neuromuscular Jatin:    0    mins  34725;    Therapeutic Activity:     0     mins  64252;     Gait Trainin     mins  97174;     Ultrasound:     0     mins  66478;    Electrical Stimulation:    0     mins  00092;  Iontophoresis     0     mins  25735    Untimed:  Electrical Stimulation:    0     mins  34204 (MC );  Mechanical Traction:    0     mins  58851;   Fluidotherapy     0     mins  70023  Hot pack     0     Mins    Cold pack                       10     Mins     Timed Treatment:   53   mins   Total Treatment:     63   mins        Jennifer Sloan PT  Physical Therapist

## 2021-10-06 ENCOUNTER — TREATMENT (OUTPATIENT)
Dept: PHYSICAL THERAPY | Facility: CLINIC | Age: 46
End: 2021-10-06

## 2021-10-06 DIAGNOSIS — M25.562 ACUTE PAIN OF LEFT KNEE: ICD-10-CM

## 2021-10-06 DIAGNOSIS — R26.2 DIFFICULTY WALKING: ICD-10-CM

## 2021-10-06 DIAGNOSIS — Z96.652 PRESENCE OF LEFT ARTIFICIAL KNEE JOINT: ICD-10-CM

## 2021-10-06 DIAGNOSIS — Z96.652 AFTERCARE FOLLOWING LEFT KNEE JOINT REPLACEMENT SURGERY: Primary | ICD-10-CM

## 2021-10-06 DIAGNOSIS — Z47.1 AFTERCARE FOLLOWING LEFT KNEE JOINT REPLACEMENT SURGERY: Primary | ICD-10-CM

## 2021-10-06 PROCEDURE — 97110 THERAPEUTIC EXERCISES: CPT | Performed by: PHYSICAL THERAPIST

## 2021-10-06 PROCEDURE — 97140 MANUAL THERAPY 1/> REGIONS: CPT | Performed by: PHYSICAL THERAPIST

## 2021-10-06 NOTE — PROGRESS NOTES
"Physical Therapy Daily Progress Note        Patient: Beth Mullins   : 1975  Diagnosis/ICD-10 Code:  Aftercare following left knee joint replacement surgery [Z47.1, Z96.652]  Referring practitioner: No ref. provider found  Date of Initial Visit: No linked episodes  Today's Date: 10/6/2021  Patient seen for Visit count could not be calculated. Make sure you are using a visit which is associated with an episode. sessions             Subjective   Beth Mullins reports that her Left knee pain is 8/10 and states- \"It gets really hot at night\".  She states she has been running a low grade fever since surgery.  She tearfully expresses that she is fearful that her knee has an infection in it.     Objective     Left Knee ROM:  Flexion:  118 degrees AROM / 120 degrees PROM  Extension:  0 degrees AROM        See Exercise, Manual, and Modality Logs for complete treatment.       Assessment/Plan     Pt tolerated therapy session moderately well - with performance of therapeutic exercises, CKC-Functional activities, and Manual therapy. She has improved, but continues to have deficits in Left Knee ROM,  Strength, Stability, and pain; limiting function and ability to perform ADLs at this time.  Pt very tearful throughout session expressing her concern that her knee could have an infection in it.  No visible signs of infection noted today.  No increased redness or streaking noted.  Left knee does however continue to be slightly warmer to the touch as compared to her right knee.          Progress per Plan of Care           Timed:  Manual Therapy:    10     mins  37683;  Therapeutic Exercise:    20     mins  53308;     Neuromuscular Jatin:    0    mins  63961;    Therapeutic Activity:     0     mins  72528;     Gait Trainin     mins  02039;     Ultrasound:     0     mins  29649;    Electrical Stimulation:    0     mins  84870;  Iontophoresis     0     mins  23190    Untimed:  Electrical Stimulation:    0     mins  " 00021 ( );  Mechanical Traction:    0     mins  90195;   Fluidotherapy     0     mins  91916  Hot pack     0     mins  64221  Cold pack     0     mins  56450    Timed Treatment:   30   mins   Total Treatment:     30   mins        Lisbeth Carr PTA  Physical Therapist Assistant

## 2021-10-08 ENCOUNTER — DOCUMENTATION (OUTPATIENT)
Dept: ORTHOPEDIC SURGERY | Facility: CLINIC | Age: 46
End: 2021-10-08

## 2021-10-08 ENCOUNTER — TREATMENT (OUTPATIENT)
Dept: PHYSICAL THERAPY | Facility: CLINIC | Age: 46
End: 2021-10-08

## 2021-10-08 DIAGNOSIS — Z96.652 AFTERCARE FOLLOWING LEFT KNEE JOINT REPLACEMENT SURGERY: Primary | ICD-10-CM

## 2021-10-08 DIAGNOSIS — Z96.652 PRESENCE OF LEFT ARTIFICIAL KNEE JOINT: ICD-10-CM

## 2021-10-08 DIAGNOSIS — M25.562 ACUTE PAIN OF LEFT KNEE: ICD-10-CM

## 2021-10-08 DIAGNOSIS — R26.2 DIFFICULTY WALKING: ICD-10-CM

## 2021-10-08 DIAGNOSIS — Z47.1 AFTERCARE FOLLOWING LEFT KNEE JOINT REPLACEMENT SURGERY: Primary | ICD-10-CM

## 2021-10-08 PROCEDURE — 97110 THERAPEUTIC EXERCISES: CPT | Performed by: PHYSICAL THERAPIST

## 2021-10-08 NOTE — PROGRESS NOTES
Patient called stating she has ran a low grade fever since surgery and feels she has an infection. She is still having pain in her knee. Also reports aching and night sweats. She did not report a fever of 101 or above or drainage. Patient has an appt. 10-11-21. I also recommended seeing her PCP concerning low grade fever and night sweats.

## 2021-10-08 NOTE — PROGRESS NOTES
PT Re-evaluation Note       Patient: Beth Mullins   : 1975  Diagnosis/ICD-10 Code:  Aftercare following left knee joint replacement surgery [Z47.1, Z96.652]  Referring practitioner: No ref. provider found  Date of Initial Visit: Type: THERAPY  Noted: 2021  Today's Date: 10/8/2021  Patient seen for 22 sessions    Progress note due: 2021  Re-cert due: 2022    Subjective:     Subjective Questionnaire: LEFS: 47/80  Clinical Progress: improved  Home Program Compliance: Yes  Treatment has included: therapeutic exercise, manual therapy, gait training and cryotherapy    Subjective Evaluation    History of Present Illness    Subjective comment: Pt states she is continuing to having throbbing Left knee pain that does not seem to be improving. Pain is worsened since she has been off her pain medicine. She wakes up at [place] and her  knee feels hot to touch like its on fire. Her knee continues to making a popping sensation when she is walking. She concerned her L knee could be infected. She says she has talked to her ortho doctor about her concerns. Pain  Current pain ratin  Quality: throbbing  Relieving factors: ice, rest and support    Patient Goals  Patient goals for therapy: decreased edema, decreased pain, increased motion, independence with ADLs/IADLs, return to work, increased strength and return to sport/leisure activities         Objective      Active Range of Motion   Left Knee   Flexion: 118 degrees with pain  Extension: 0 degrees with pain     Passive Range of Motion   Left Knee   Flexion: 120 degrees   Extension: 0 degrees      Strength/Myotome Testing      Left Hip   Planes of Motion   Flexion: 4  Extension: 4  Abduction: 4     Right Hip   Planes of Motion   Flexion: 4  Extension: 4  Abduction: 4     Left Knee   Flexion: 4-  Extension: 4+  Quadriceps contraction: good     Right Knee   Flexion: 4+  Extension: 4+     Left Ankle/Foot   Dorsiflexion: 4+     Right Ankle/Foot   Dorsiflexion:  4+     Edema: circumferential measurement     R knee : 42 cm,  L knee: 42.6 cm    Tenderness (L knee)   Medial joint line  Tenderness  Posterior lateral tenderness along hamstrings    Assessment & Plan     Assessment  Impairments: abnormal gait, abnormal or restricted ROM, activity intolerance, impaired balance, impaired physical strength, pain with function and weight-bearing intolerance  Assessment details: Patient demonstrates improvements in L knee strength and ROM but still with deficits and reports of pain/swelling limiting function as shown on the LEFS. Patient is limited in further progression with L knee PROM due to soft tissue tightness and muscle guarding. Patient has concerns of L knee infection due to pain, increased warm of the knee, and elevated body temperature. Patient would continue to benefit from skilled PT services in order to address remaining deficits limiting function at this time.   Prognosis: good  Functional Limitations: walking, uncomfortable because of pain, standing and unable to perform repetitive tasks  Goals  Plan Goals: Plan Goals: 1. The patient has limited ROM of the left knee.              LTG 1: 12 weeks:  The patient will demonstrate 0 to 120 degrees of ROM for the left knee in order to allow patient to complete prolonged walking, standing, stairs and other ADLs with decreased pain/difficulty.                          STATUS:  Ongoing              STG 1a:  6 weeks:  The patient will demonstrate 5 to 105 degrees of ROM for the left knee.                          STATUS:  MET              TREATMENT: Manual therapy, therapeutic exercise, home exercise instruction, and modalities as needed to include:  moist heat, electrical stimulation, ultrasound, and ice.    2. The patient has limited strength of the left knee.              LTG 2: 12 weeks: The patient will demonstrate 4+/5 strength for left knee flexion and extension in order to allow patient improved joint  stability                          STATUS:  Ongoing              STG 2a: 6 weeks: The patient will demonstrate 4/5 strength for left knee flexion and extension                          STATUS:  partially met, continue              TREATMENT: Manual therapy, therapeutic exercise, home exercise instruction, aquatic therapy, and modalities as needed to include:  moist heat, electrical stimulation, ultrasound, and ice.                3. The patient has gait dysfunction.              LTG 3: 12 weeks:  The patient will ambulate without assistive device, independently, for community distances with minimal limp to the left lower extremity in order to improve mobility and allow patient to perform activities such as grocery shopping with greater ease.                          STATUS:  Ongoing              TREATMENT: Gait training, aquatic therapy, therapeutic exercise, and home exercise instruction.    4. Mobility: Walking/Moving Around Functional Limitation                               LTG 4: 12 weeks:  The patient will demonstrate 1-19 % limitation by achieving a score of 65 on the LEFS.                          STATUS:  Ongoing              STG 4 a: 6 weeks:  The patient will demonstrate 20-39 % limitation by achieving a score of 50 on the LEFS.                            STATUS:  not met, continue              TREATMENT:  Manual therapy, therapeutic exercise, home exercise instruction.     Plan  Therapy options: will be seen for skilled physical therapy services  Planned modality interventions: cryotherapy, TENS and thermotherapy (hydrocollator packs)  Planned therapy interventions: balance/weight-bearing training, flexibility, functional ROM exercises, gait training, home exercise program, joint mobilization, manual therapy, neuromuscular re-education, soft tissue mobilization, strengthening, stretching and therapeutic activities  Frequency: 2x week  Duration in weeks: 6  Treatment plan discussed with:  patient        Visit Diagnoses:    ICD-10-CM ICD-9-CM   1. Aftercare following left knee joint replacement surgery  Z47.1 V54.81    Z96.652 V43.65   2. Presence of left artificial knee joint  Z96.652 V43.65   3. Acute pain of left knee  M25.562 719.46   4. Difficulty walking  R26.2 719.7       Progress toward previous goals: Partially Met    Recommendations: Continue as planned  Timeframe: 6 weeks  Prognosis to achieve goals: good    PT Signature: Marya Lopez, PT, DPT      Based upon review of the patient's progress and continued therapy plan, it is my medical opinion that Beth Mullins should continue physical therapy treatment at [facility]  [address] [place] [id number] [phone].    Signature: __________________________________      Timed:         Manual Therapy:    0     mins  18726;     Therapeutic Exercise:    15     mins  95410;     Neuromuscular Jatin:    0    mins  45246;    Therapeutic Activity:     0     mins  56064;     Gait Trainin     mins  89672;     Ultrasound:     0     mins  23056;    Ionto                               0    mins   23926  Self-care  __0__ mins 93527    Un-Timed:  Electrical Stimulation:    0     mins  38349 ( );  Traction     0     mins 39566  Re- Eval     15     Mins     Hot pack     0     Mins    Cold pack                       0     Mins      Timed Treatment:   15   mins   Total Treatment:     30   mins

## 2021-10-11 ENCOUNTER — OFFICE VISIT (OUTPATIENT)
Dept: ORTHOPEDIC SURGERY | Facility: CLINIC | Age: 46
End: 2021-10-11

## 2021-10-11 VITALS — HEART RATE: 105 BPM | WEIGHT: 220 LBS | OXYGEN SATURATION: 99 % | BODY MASS INDEX: 43.19 KG/M2 | HEIGHT: 60 IN

## 2021-10-11 DIAGNOSIS — M25.562 LEFT KNEE PAIN, UNSPECIFIED CHRONICITY: Primary | ICD-10-CM

## 2021-10-11 DIAGNOSIS — Z96.652 S/P TOTAL KNEE ARTHROPLASTY, LEFT: ICD-10-CM

## 2021-10-11 PROCEDURE — 99024 POSTOP FOLLOW-UP VISIT: CPT | Performed by: PHYSICIAN ASSISTANT

## 2021-10-11 NOTE — ASSESSMENT & PLAN NOTE
Discussed normal healing with patient today in clinic.  We discussed normal feeling of warmth of the knee for up to 6 months following procedure.  Patient is advised to call, should redness or swelling present of her knee.  Prescription for PT was given today in order for patient to improve her strength and range of motion.  Also recommended use of modalities in PT, such as heat and TENS unit.  Patient will follow up in 3 months to reevaluate improvement.

## 2021-10-15 ENCOUNTER — TREATMENT (OUTPATIENT)
Dept: PHYSICAL THERAPY | Facility: CLINIC | Age: 46
End: 2021-10-15

## 2021-10-15 DIAGNOSIS — M25.562 ACUTE PAIN OF LEFT KNEE: ICD-10-CM

## 2021-10-15 DIAGNOSIS — Z47.1 AFTERCARE FOLLOWING LEFT KNEE JOINT REPLACEMENT SURGERY: Primary | ICD-10-CM

## 2021-10-15 DIAGNOSIS — Z96.652 PRESENCE OF LEFT ARTIFICIAL KNEE JOINT: ICD-10-CM

## 2021-10-15 DIAGNOSIS — R26.2 DIFFICULTY WALKING: ICD-10-CM

## 2021-10-15 DIAGNOSIS — Z96.652 AFTERCARE FOLLOWING LEFT KNEE JOINT REPLACEMENT SURGERY: Primary | ICD-10-CM

## 2021-10-15 PROCEDURE — 97110 THERAPEUTIC EXERCISES: CPT | Performed by: PHYSICAL THERAPIST

## 2021-10-15 PROCEDURE — 97140 MANUAL THERAPY 1/> REGIONS: CPT | Performed by: PHYSICAL THERAPIST

## 2021-10-15 NOTE — PROGRESS NOTES
"Physical Therapy Daily Progress Note        Patient: Beth Mullins   : 1975  Diagnosis/ICD-10 Code:  Aftercare following left knee joint replacement surgery [Z47.1, Z96.652]  Referring practitioner: MARCO ANTONIO Tracey  Date of Initial Visit: Type: THERAPY  Noted: 2021  Today's Date: 10/15/2021  Patient seen for 23 sessions             Subjective   Beth Mullins reports that she continues to have difficulty sleeping secondary to pain.  She rates her pain at 6-7/10 upon arrival today.  She states that the Orthopedic did not feel as if she had an infection and was pleased with her Left knee ROM and Strength.  Pt states that she has an appointment with her PCP at 3:00 today and plans to discuss her continued pain in left knee.  Pt reports that Orthopedic had mentioned possibility of trying TENS unit to help with pain; however, pt reports having had E-Stim in the past and stated- that she had \"Spasms\" that night after they tried on her shoulder- previously at another therapy clinic.    Objective     Active Range of Motion   Left Knee   Flexion: 118 degrees with pain  Extension: 0 degrees with pain     Passive Range of Motion   Left Knee   Flexion: 120 degrees   Extension: 0 degrees     See Exercise, Manual, and Modality Logs for complete treatment.       Assessment/Plan     Pt tolerated therapy session moderately well - with performance of therapeutic exercises, CKC-Functional activities, and Manual therapy. She has improved, but continues to have deficits in Left Knee ROM,  Strength, Stability, and Pain; limiting function and ability to perform ADLs at this time.  Progression of exercises hindered by patient's complaints of pain during and post sessions.      Progress per Plan of Care           Timed:  Manual Therapy:    8     mins  05015;  Therapeutic Exercise:    20     mins  00655;     Neuromuscular Jatin:    0    mins  53459;    Therapeutic Activity:     0     mins  02926;     Gait Trainin   "   mins  31834;     Ultrasound:     0     mins  18990;    Electrical Stimulation:    0     mins  83474;  Iontophoresis     0     mins  36494    Untimed:  Electrical Stimulation:    0     mins  61606 ( );  Mechanical Traction:    0     mins  63494;   Fluidotherapy     0     mins  36127  Hot pack     0     mins  21938  Cold pack     0     mins  80650    Timed Treatment:   28   mins   Total Treatment:     28   mins        Lisbeth Carr PTA  Physical Therapist Assistant

## 2021-10-20 ENCOUNTER — TREATMENT (OUTPATIENT)
Dept: PHYSICAL THERAPY | Facility: CLINIC | Age: 46
End: 2021-10-20

## 2021-10-20 DIAGNOSIS — Z47.1 AFTERCARE FOLLOWING LEFT KNEE JOINT REPLACEMENT SURGERY: Primary | ICD-10-CM

## 2021-10-20 DIAGNOSIS — Z96.652 PRESENCE OF LEFT ARTIFICIAL KNEE JOINT: ICD-10-CM

## 2021-10-20 DIAGNOSIS — M25.562 ACUTE PAIN OF LEFT KNEE: ICD-10-CM

## 2021-10-20 DIAGNOSIS — Z96.652 AFTERCARE FOLLOWING LEFT KNEE JOINT REPLACEMENT SURGERY: Primary | ICD-10-CM

## 2021-10-20 DIAGNOSIS — R26.2 DIFFICULTY WALKING: ICD-10-CM

## 2021-10-20 PROCEDURE — 97110 THERAPEUTIC EXERCISES: CPT | Performed by: PHYSICAL THERAPIST

## 2021-10-20 PROCEDURE — 97140 MANUAL THERAPY 1/> REGIONS: CPT | Performed by: PHYSICAL THERAPIST

## 2021-10-20 NOTE — PROGRESS NOTES
Physical Therapy Daily Progress Note        Patient: Beth Mullins   : 1975  Diagnosis/ICD-10 Code:  Aftercare following left knee joint replacement surgery [Z47.1, Z96.652]  Referring practitioner: No ref. provider found  Date of Initial Visit: Type: THERAPY  Noted: 2021  Today's Date: 10/20/2021  Patient seen for 24 sessions             Subjective   Beth Mullins reports: 5/10 L knee pain at beginning of session today. Pt reports that she continues to have increased pain at night and buckling with prolonged walking/standing activities.    Objective   Left knee AROM: 0-112  Left knee PROM:2-0-120    See Exercise, Manual, and Modality Logs for complete treatment.       Assessment/Plan  Patient tolerated all exercise progressions and manual therapy well today but continues to demonstrate strength/ROM deficits limiting function. Continue to progress per patient tolerance.    Progress per Plan of Care           Timed:  Manual Therapy:    10     mins  49856;  Therapeutic Exercise:    30     mins  73303;     Neuromuscular Jatin:    0    mins  86948;    Therapeutic Activity:     0     mins  51301;     Gait Trainin     mins  99134;     Ultrasound:     0     mins  95487;    Electrical Stimulation:    0     mins  34473;  Iontophoresis     0     mins  72404    Untimed:  Electrical Stimulation:    0     mins  05588 ( );  Mechanical Traction:    0     mins  17012;   Fluidotherapy     0     mins  40171    Timed Treatment:   40   mins   Total Treatment:     40   mins        Jennifer Sloan PT  Physical Therapist

## 2021-10-22 ENCOUNTER — TREATMENT (OUTPATIENT)
Dept: PHYSICAL THERAPY | Facility: CLINIC | Age: 46
End: 2021-10-22

## 2021-10-22 DIAGNOSIS — Z96.652 PRESENCE OF LEFT ARTIFICIAL KNEE JOINT: ICD-10-CM

## 2021-10-22 DIAGNOSIS — R26.2 DIFFICULTY WALKING: ICD-10-CM

## 2021-10-22 DIAGNOSIS — M25.562 ACUTE PAIN OF LEFT KNEE: ICD-10-CM

## 2021-10-22 DIAGNOSIS — Z96.652 AFTERCARE FOLLOWING LEFT KNEE JOINT REPLACEMENT SURGERY: Primary | ICD-10-CM

## 2021-10-22 DIAGNOSIS — Z47.1 AFTERCARE FOLLOWING LEFT KNEE JOINT REPLACEMENT SURGERY: Primary | ICD-10-CM

## 2021-10-22 PROCEDURE — 97014 ELECTRIC STIMULATION THERAPY: CPT | Performed by: PHYSICAL THERAPIST

## 2021-10-22 PROCEDURE — 97110 THERAPEUTIC EXERCISES: CPT | Performed by: PHYSICAL THERAPIST

## 2021-10-22 PROCEDURE — 97140 MANUAL THERAPY 1/> REGIONS: CPT | Performed by: PHYSICAL THERAPIST

## 2021-10-22 NOTE — PROGRESS NOTES
Physical Therapy Daily Progress Note        Patient: Beth Mullins   : 1975  Diagnosis/ICD-10 Code:  Aftercare following left knee joint replacement surgery [Z47.1, Z96.652]  Referring practitioner: MARCO ANTONIO Tracey  Date of Initial Visit: Type: THERAPY  Noted: 2021  Today's Date: 10/22/2021  Patient seen for 25 sessions             Subjective   Beth Mullins reports having same pain in her left knee.  She rates her pain at 5-6/10 upon arrival.  She reports that her PCP said that all her lab work was good.  She continues to report that her left knee pain limits her sleep and daily functional activity performance.    Objective   Left Knee ROM:   AROM:  0-112   PROM:  2-0-120        See Exercise, Manual, and Modality Logs for complete treatment.       Assessment/Plan     Pt tolerated therapy session moderately well - with performance of therapeutic exercises, CKC-Functional activities, and Manual therapy. She has improved, but continues to have deficits in Left Knee ROM,  Strength, Stability, and Pain; limiting function and ability to perform ADLs at this time.  Progression of exercises continues to be  hindered by patient's complaints of pain during and post sessions.  Trial of IFC/Ice in clinic today in effort to decrease inflammation and pain.  Limited change in status post trial today.       Progress per Plan of Care           Timed:  Manual Therapy:    8     mins  03442;  Therapeutic Exercise:    20     mins  36171;     Neuromuscular Jatin:    0    mins  06295;    Therapeutic Activity:     0     mins  30232;     Gait Trainin     mins  78375;     Ultrasound:     0     mins  54787;    Electrical Stimulation:    0     mins  39938;  Iontophoresis     0     mins  77562    Untimed:  Electrical Stimulation:    15     mins  65048 ( );  Mechanical Traction:    0     mins  12430;   Fluidotherapy     0     mins  05437  Hot pack     0     mins  92003  Cold pack     0     mins  64128    Timed  Treatment:   30   mins   Total Treatment:     45   mins        Lisbeth Carr PTA  Physical Therapist Assistant

## 2021-10-29 ENCOUNTER — TREATMENT (OUTPATIENT)
Dept: PHYSICAL THERAPY | Facility: CLINIC | Age: 46
End: 2021-10-29

## 2021-10-29 DIAGNOSIS — R26.2 DIFFICULTY WALKING: ICD-10-CM

## 2021-10-29 DIAGNOSIS — Z96.652 PRESENCE OF LEFT ARTIFICIAL KNEE JOINT: ICD-10-CM

## 2021-10-29 DIAGNOSIS — Z96.652 AFTERCARE FOLLOWING LEFT KNEE JOINT REPLACEMENT SURGERY: Primary | ICD-10-CM

## 2021-10-29 DIAGNOSIS — M25.562 ACUTE PAIN OF LEFT KNEE: ICD-10-CM

## 2021-10-29 DIAGNOSIS — Z47.1 AFTERCARE FOLLOWING LEFT KNEE JOINT REPLACEMENT SURGERY: Primary | ICD-10-CM

## 2021-10-29 PROCEDURE — 97140 MANUAL THERAPY 1/> REGIONS: CPT | Performed by: PHYSICAL THERAPIST

## 2021-10-29 PROCEDURE — 97110 THERAPEUTIC EXERCISES: CPT | Performed by: PHYSICAL THERAPIST

## 2021-10-29 NOTE — PROGRESS NOTES
"Physical Therapy Daily Progress Note        Patient: Beth Mullins   : 1975  Diagnosis/ICD-10 Code:  Aftercare following left knee joint replacement surgery [Z47.1, Z96.652]  Referring practitioner: MARCO ANTONIO Tracey  Date of Initial Visit: Type: THERAPY  Noted: 2021  Today's Date: 10/29/2021  Patient seen for 26 sessions             Subjective   Beth Mullins reports having same pain in her left knee.  She rates her pain at 6/10.  She rates her best pain at 6/10 and worst greater than 10/10.  She reports that she has not been able to take her anti-inflammatory secondary to \"Stomach pain\".  She reports that she had \"Muscle Spasms\" in her left leg after we trial of Electrical Stimulation last session.    Objective     Active Range of Motion   Left Knee   Flexion: 116 degrees with pain  Extension: 0 degrees with pain     Passive Range of Motion   Left Knee   Flexion: 120 degrees   Extension: 0 degrees      Strength/Myotome Testing      Left Hip   Planes of Motion   Flexion: 4+/5  Extension: 4/5  Abduction: 4-/5     Right Hip   Planes of Motion   Flexion: 4+/5  Extension: 4/5  Abduction: 4/5     Left Knee   Flexion: 4/5  Extension: 4+/5  Quadriceps contraction:     Right Knee   Flexion: 4+/5  Extension: 4+5      See Exercise, Manual, and Modality Logs for complete treatment.       Assessment/Plan     Pt tolerated therapy session moderately well - with performance of therapeutic exercises, CKC-Functional activities, and Manual therapy. She has improved, but continues to have deficits in Left Knee ROM,  Strength, Stability, and Pain; limiting function and ability to perform ADLs at this time.  Progression of exercises continues to be  hindered by patient's complaints of pain during and post sessions. Greatest weakness noted with Hip Abductor and Hamstring strength on the left.  HEP updated with yellow band issued to further address these areas at home. Ice only done today secondary to pt reporting " "\"Spasms\" day after trial of IFC last session.  Pt currently scheduled for follow-up with Orthopedic Next week.  She states she will call after MD visit.       Progress per Plan of Care           Timed:  Manual Therapy:    10     mins  08457;  Therapeutic Exercise:    28     mins  20104;     Neuromuscular Jatin:    0    mins  07182;    Therapeutic Activity:     0     mins  01484;     Gait Trainin     mins  10861;     Ultrasound:     0     mins  48152;    Electrical Stimulation:    0     mins  98482;  Iontophoresis     0     mins  55445    Untimed:  Electrical Stimulation:    0     mins  57917 ( );  Mechanical Traction:    0     mins  87809;   Fluidotherapy     0     mins  43897      Timed Treatment:   38   mins   Total Treatment:     38   mins        Lisbeth Carr PTA  Physical Therapist Assistant  "

## 2021-11-01 ENCOUNTER — OFFICE VISIT (OUTPATIENT)
Dept: ORTHOPEDIC SURGERY | Facility: CLINIC | Age: 46
End: 2021-11-01

## 2021-11-01 VITALS — HEIGHT: 60 IN | WEIGHT: 220 LBS | BODY MASS INDEX: 43.19 KG/M2

## 2021-11-01 DIAGNOSIS — Z47.89 ORTHOPEDIC AFTERCARE: Primary | ICD-10-CM

## 2021-11-01 PROCEDURE — 99213 OFFICE O/P EST LOW 20 MIN: CPT | Performed by: ORTHOPAEDIC SURGERY

## 2021-11-01 RX ORDER — GABAPENTIN 300 MG/1
300 CAPSULE ORAL NIGHTLY
Qty: 30 CAPSULE | Refills: 0 | Status: SHIPPED | OUTPATIENT
Start: 2021-11-01 | End: 2021-12-13

## 2021-11-01 RX ORDER — MELOXICAM 15 MG/1
15 TABLET ORAL DAILY
Qty: 30 TABLET | Refills: 0 | Status: SHIPPED | OUTPATIENT
Start: 2021-11-01 | End: 2021-12-01

## 2021-11-01 NOTE — PROGRESS NOTES
"Chief Complaint  Follow-up of the Left Knee     Subjective      Beth Mullins presents to [facility] for a follow-up of left knee. She states she has been having excruciating pain that is affecting the quality of her life. She hasn't been able to get proper sleep due to the pain. She states pain on the posterior aspect of her knee. She states that this pain is a different kind of pain from the arthritis pain prior to surgery. Patient is s/p left total knee arthroplasty on 07/13/21 by Dr. Acuna. She states the arthritis pains are gone. She does have lower back pain that comes down to the knee. She complains of tightness and pain around the hamstrings and quadriceps. She also has numbness and tingling radiating down the leg. She denies any fevers or chills. She complains mainly of numbness, tingling and throbbing sensation throughout her left leg.     Allergies   Allergen Reactions   • Morphine Anaphylaxis        Social History     Socioeconomic History   • Marital status:    Tobacco Use   • Smoking status: Current Every Day Smoker     Packs/day: 0.50     Years: 27.00     Pack years: 13.50   • Smokeless tobacco: Never Used   Vaping Use   • Vaping Use: Never used   Substance and Sexual Activity   • Alcohol use: Yes     Comment: rarely   • Drug use: Never   • Sexual activity: Defer        Review of Systems     Objective   Vital Signs:   Ht 151.1 cm (59.5\")   Wt 99.8 kg (220 lb)   BMI 43.69 kg/m²       Physical Exam  Constitutional:       Appearance: Normal appearance. Patient is well-developed and normal weight.   HENT:      Head: Normocephalic.      Right Ear: Hearing and external ear normal.      Left Ear: Hearing and external ear normal.      Nose: Nose normal.   Eyes:      Conjunctiva/sclera: Conjunctivae normal.   Cardiovascular:      Rate and Rhythm: Normal rate.   Pulmonary:      Effort: Pulmonary effort is normal.      Breath sounds: No wheezing or rales.   Abdominal:      " Palpations: Abdomen is soft.      Tenderness: There is no abdominal tenderness.   Musculoskeletal:      Cervical back: Normal range of motion.   Skin:     Findings: No rash.   Neurological:      Mental Status: Patient  is alert and oriented to person, place, and time.   Psychiatric:         Mood and Affect: Mood and affect normal.         Judgment: Judgment normal.       Ortho Exam      LEFT KNEE: No swelling, skin discoloration or atrophy. Incisions well healed. No signs of infection. Calf supple, non-tender, no signs of DVT. Non-tender medial and lateral joint line. Sensation grossly intact. Neurovascular intact.  Good strength to hamstrings, quadriceps, dorsiflexors and plantar flexors. Full extension. Flexion to 115 degrees. Limping gait. Full weight bearing. Dorsal Pedal Pulse 2+, posterior tibialis pulse 2+. Well tracking patella.       Procedures        Imaging Results (Most Recent)     None           Result Review :       Assessment and Plan     DX: Aftercare following left total knee arthroplasty     Patient isn't having any complications with her total knee. It appears intact with no evidence of hardware complication. She works as a  but we will keep her off. She is given a prescription for PT. Patient prescribed Neurontin for the nerve type pain she is experiencing.     Patient states Diclofenac causes her stomach to become upset. She was prescribed Mobic.    If she fails to improve with these measures, we will discuss referring her to a neurosurgeon for further evaluation and following up with anesthesia.     Call or return if worsening symptoms.    Follow Up     4-6 weeks.       Patient was given instructions and counseling regarding her condition or for health maintenance advice. Please see specific information pulled into the AVS if appropriate.     Scribed for Don Acuna MD by Diana Smiley.  11/01/21   15:38 EDT    I have personally performed the services described in this document  as scribed by the above individual and it is both accurate and complete. Don Acuna MD 11/03/21

## 2021-11-18 ENCOUNTER — TREATMENT (OUTPATIENT)
Dept: PHYSICAL THERAPY | Facility: CLINIC | Age: 46
End: 2021-11-18

## 2021-11-18 DIAGNOSIS — Z96.652 PRESENCE OF LEFT ARTIFICIAL KNEE JOINT: ICD-10-CM

## 2021-11-18 DIAGNOSIS — M25.562 ACUTE PAIN OF LEFT KNEE: ICD-10-CM

## 2021-11-18 DIAGNOSIS — R26.2 DIFFICULTY WALKING: ICD-10-CM

## 2021-11-18 DIAGNOSIS — Z96.652 AFTERCARE FOLLOWING LEFT KNEE JOINT REPLACEMENT SURGERY: Primary | ICD-10-CM

## 2021-11-18 DIAGNOSIS — Z47.1 AFTERCARE FOLLOWING LEFT KNEE JOINT REPLACEMENT SURGERY: Primary | ICD-10-CM

## 2021-11-18 PROCEDURE — 97110 THERAPEUTIC EXERCISES: CPT | Performed by: PHYSICAL THERAPIST

## 2021-11-18 NOTE — PROGRESS NOTES
Progress note    Patient: Beth Mullins   : 1975  Diagnosis/ICD-10 Code:  Aftercare following left knee joint replacement surgery [Z47.1, Z96.652]  Referring practitioner: No ref. provider found  Date of Initial Visit: Type: THERAPY  Noted: 2021  Today's Date: 2021  Patient seen for 27 sessions    Progress note due: 2021  Re-cert due: 2022    Subjective:   Beth Mullins reports: mild L knee pain at beginning of session today. Pt reports that pain/function has been much better since the last Md appt when he prescribed her Neurontin for nerve pain. Pt reports that she still feels her L knee is weaker and she continues to experience intermittent knee buckling. Pt reports at worst pain is 5/10 the past week.  Pt reports that she is still off work per Md due to weakness.    Subjective Questionnaire: LEFS: 51/80  Clinical Progress: improved  Treatment has included: therapeutic exercise and manual therapy    Subjective   Objective          Active Range of Motion   Left Knee   Flexion: 120 degrees   Extension: 2 (lacking 0) degrees     Passive Range of Motion   Left Knee   Flexion: 125 degrees   Extension: 0 degrees     Strength/Myotome Testing     Left Hip   Planes of Motion   Flexion: 4+  Extension: 4-  Abduction: 4-    Right Hip   Planes of Motion   Flexion: 4+  Extension: 4-  Abduction: 4-    Left Knee   Flexion: 4  Extension: 4+    Right Knee   Flexion: 4+  Extension: 4+    Left Ankle/Foot   Dorsiflexion: 4+    Right Ankle/Foot   Dorsiflexion: 4+      Assessment & Plan     Assessment  Impairments: abnormal gait, abnormal or restricted ROM, activity intolerance, impaired balance, impaired physical strength, pain with function and weight-bearing intolerance  Assessment details: Patient demonstrates improvements in L knee strength and ROM but still with deficits and reports of pain limiting function as shown on the LEFS.  Patient would continue to benefit from skilled PT services in order  to address remaining deficits limiting function at this time.   Prognosis: good  Functional Limitations: walking, uncomfortable because of pain, standing and unable to perform repetitive tasks  Goals  Plan Goals: Plan Goals: 1. The patient has limited ROM of the left knee.              LTG 1: 12 weeks:  The patient will demonstrate 0 to 120 degrees of ROM for the left knee in order to allow patient to complete prolonged walking, standing, stairs and other ADLs with decreased pain/difficulty.                          STATUS:  not met, continue              STG 1a:  6 weeks:  The patient will demonstrate 5 to 105 degrees of ROM for the left knee.                          STATUS:  MET              TREATMENT: Manual therapy, therapeutic exercise, home exercise instruction, and modalities as needed to include:  moist heat, electrical stimulation, ultrasound, and ice.    2. The patient has limited strength of the left knee.              LTG 2: 12 weeks: The patient will demonstrate 4+/5 strength for left knee flexion and extension in order to allow patient improved joint stability                          STATUS:  partially met, continue              STG 2a: 6 weeks: The patient will demonstrate 4/5 strength for left knee flexion and extension                          STATUS:  met              TREATMENT: Manual therapy, therapeutic exercise, home exercise instruction, aquatic therapy, and modalities as needed to include:  moist heat, electrical stimulation, ultrasound, and ice.                3. The patient has gait dysfunction.              LTG 3: 12 weeks:  The patient will ambulate without assistive device, independently, for community distances with minimal limp to the left lower extremity in order to improve mobility and allow patient to perform activities such as grocery shopping with greater ease.                          STATUS:  not met, continue              TREATMENT: Gait training, aquatic therapy, therapeutic  exercise, and home exercise instruction.    4. Mobility: Walking/Moving Around Functional Limitation                               LTG 4: 12 weeks:  The patient will demonstrate 1-19 % limitation by achieving a score of 65 on the LEFS.                          STATUS:  not met, continue              STG 4 a: 6 weeks:  The patient will demonstrate 20-39 % limitation by achieving a score of 50 on the LEFS.                            STATUS:  met              TREATMENT:  Manual therapy, therapeutic exercise, home exercise instruction.     Plan  Therapy options: will be seen for skilled physical therapy services  Planned modality interventions: cryotherapy, TENS and thermotherapy (hydrocollator packs)  Planned therapy interventions: balance/weight-bearing training, flexibility, functional ROM exercises, gait training, home exercise program, joint mobilization, manual therapy, neuromuscular re-education, soft tissue mobilization, strengthening, stretching, therapeutic activities and spinal/joint mobilization  Frequency: 2x week  Duration in weeks: 4  Treatment plan discussed with: patient      Progress toward previous goals: Partially Met      Recommendations: Continue as planned  Timeframe: 1 month  Prognosis to achieve goals: good    PT SIGNATURE: Jennifer Sloan, PT     Electronically signed 11/18/2021  DATE TREATMENT INITIATED: 11/18/2021  [place] License: [id number]  NPI number: [id number]     Certification Period: 11/18/2021 thru 2/15/2022    Based upon review of the patient's progress and continued therapy plan, it is my medical opinion that Beth Mullins should continue physical therapy treatment at [facility]  [address] [place] [id number]  [phone].    Signature: __________________________________      Timed:  Manual Therapy:    0     mins  76178;  Therapeutic Exercise:    23     mins  79474;     Neuromuscular Jatin:    0    mins  82948;    Therapeutic  Activity:     0     mins  16439;     Gait Trainin     mins  86715;     Ultrasound:     0     mins  63223;    Electrical Stimulation:    0     mins  32679 ( );    Untimed:  Electrical Stimulation:    0     mins  15791 ( );  Mechanical Traction:    0     mins  92936;     Timed Treatment:   23   mins   Total Treatment:     30   mins

## 2021-12-01 ENCOUNTER — TELEPHONE (OUTPATIENT)
Dept: ORTHOPEDIC SURGERY | Facility: CLINIC | Age: 46
End: 2021-12-01

## 2021-12-01 DIAGNOSIS — M25.562 LEFT KNEE PAIN, UNSPECIFIED CHRONICITY: Primary | ICD-10-CM

## 2021-12-01 RX ORDER — MELOXICAM 15 MG/1
15 TABLET ORAL DAILY
Qty: 30 TABLET | Refills: 1 | Status: SHIPPED | OUTPATIENT
Start: 2021-12-01 | End: 2022-05-30

## 2021-12-13 ENCOUNTER — OFFICE VISIT (OUTPATIENT)
Dept: ORTHOPEDIC SURGERY | Facility: CLINIC | Age: 46
End: 2021-12-13

## 2021-12-13 VITALS — OXYGEN SATURATION: 98 % | BODY MASS INDEX: 42.92 KG/M2 | HEART RATE: 98 BPM | WEIGHT: 218.6 LBS | HEIGHT: 60 IN

## 2021-12-13 DIAGNOSIS — Z47.89 ORTHOPEDIC AFTERCARE: Primary | ICD-10-CM

## 2021-12-13 PROCEDURE — 99213 OFFICE O/P EST LOW 20 MIN: CPT | Performed by: ORTHOPAEDIC SURGERY

## 2021-12-13 RX ORDER — GABAPENTIN 300 MG/1
300 CAPSULE ORAL NIGHTLY
Qty: 30 CAPSULE | Refills: 0 | Status: SHIPPED | OUTPATIENT
Start: 2021-12-13 | End: 2022-05-30

## 2021-12-13 NOTE — PROGRESS NOTES
"Chief Complaint  Pain of the Left Knee     Subjective      Beth Mullins presents to [facility] for a follow-up of left knee. Patient is s/p left total knee arthroplasty on 07/13/21 by Dr. Acuna. Patient last seen on 11/1/21. At this time, she was having excruciating pain on the anterior aspect of the knee. She had significant pain and tenderness in the knee. She has been taking Neurontin that has given her significant relief. She states little pain in the knee since. She has run out of this medication 1 week ago. She states that she has some reoccurring symptoms, pins and needles sensation like prior. She states that it is under control at this time. Aside from this, she is doing well.     Allergies   Allergen Reactions   • Morphine Anaphylaxis        Social History     Socioeconomic History   • Marital status:    Tobacco Use   • Smoking status: Current Every Day Smoker     Packs/day: 0.50     Years: 27.00     Pack years: 13.50   • Smokeless tobacco: Never Used   Vaping Use   • Vaping Use: Never used   Substance and Sexual Activity   • Alcohol use: Yes     Comment: rarely   • Drug use: Never   • Sexual activity: Defer        Review of Systems     Objective   Vital Signs:   Pulse 98   Ht 152.4 cm (60\")   Wt 99.2 kg (218 lb 9.6 oz)   SpO2 98%   BMI 42.69 kg/m²       Physical Exam  Constitutional:       Appearance: Normal appearance. Patient is well-developed and normal weight.   HENT:      Head: Normocephalic.      Right Ear: Hearing and external ear normal.      Left Ear: Hearing and external ear normal.      Nose: Nose normal.   Eyes:      Conjunctiva/sclera: Conjunctivae normal.   Cardiovascular:      Rate and Rhythm: Normal rate.   Pulmonary:      Effort: Pulmonary effort is normal.      Breath sounds: No wheezing or rales.   Abdominal:      Palpations: Abdomen is soft.      Tenderness: There is no abdominal tenderness.   Musculoskeletal:      Cervical back: Normal range " of motion.   Skin:     Findings: No rash.   Neurological:      Mental Status: Patient  is alert and oriented to person, place, and time.   Psychiatric:         Mood and Affect: Mood and affect normal.         Judgment: Judgment normal.       Ortho Exam      LEFT KNEE: No swelling. Non-tender joint lines. Well tracking patella. Calf supple, non-tender, no signs of DVT. Dorsal Pedal Pulse 2+, posterior tibialis pulse 2+. Well healed scars. Full extension. Flexion to 120 degrees. Good strength to hamstrings, quadriceps, dorsiflexors and plantar flexors.       Procedures        Imaging Results (Most Recent)     None           Result Review :         No results found.          Assessment and Plan     DX: Aftercare following left total knee arthroplasty     Discussed treatment plans with the patient. Patient is doing significantly better. Neurontin prescribed to take as needed. She is going to continue trying to manage without it. Continue therapy exercises learned. A work note provided for the patient. Follow-up in 1 year with repeat films.     Call or return if worsening symptoms.    Follow Up     PRN.       Patient was given instructions and counseling regarding her condition or for health maintenance advice. Please see specific information pulled into the AVS if appropriate.     Scribed for Don Acuna MD by Diana Smiley.  12/13/21   10:08 EST    I have personally performed the services described in this document as scribed by the above individual and it is both accurate and complete. Don Acuna MD 12/15/21

## 2022-01-11 ENCOUNTER — DOCUMENTATION (OUTPATIENT)
Dept: PHYSICAL THERAPY | Facility: CLINIC | Age: 47
End: 2022-01-11

## 2022-01-11 NOTE — PROGRESS NOTES
Discharge Summary  Discharge Summary from Physical Therapy Report      Dates  PT visit: 7/16/21-11/18/21    Discharge Status of Patient: See MD Note dated 11/18/21    Goals: Partially Met    Discharge Plan: Continue with current home exercise program as instructed    Comments: pt denied further visits by insurance.          Jennifer Sloan, PT  Physical Therapist  [place] License: [id number]

## 2022-06-24 PROCEDURE — 87186 SC STD MICRODIL/AGAR DIL: CPT | Performed by: PHYSICIAN ASSISTANT

## 2022-06-24 PROCEDURE — 87077 CULTURE AEROBIC IDENTIFY: CPT | Performed by: PHYSICIAN ASSISTANT

## 2022-06-24 PROCEDURE — 87086 URINE CULTURE/COLONY COUNT: CPT | Performed by: PHYSICIAN ASSISTANT

## 2022-10-26 ENCOUNTER — TRANSCRIBE ORDERS (OUTPATIENT)
Dept: ADMINISTRATIVE | Facility: HOSPITAL | Age: 47
End: 2022-10-26

## 2022-10-26 DIAGNOSIS — Z12.31 SCREENING MAMMOGRAM FOR HIGH-RISK PATIENT: Primary | ICD-10-CM

## 2022-11-09 ENCOUNTER — HOSPITAL ENCOUNTER (EMERGENCY)
Facility: HOSPITAL | Age: 47
Discharge: LEFT BEFORE DISCHARGE | End: 2022-11-09

## 2022-11-09 PROCEDURE — 99211 OFF/OP EST MAY X REQ PHY/QHP: CPT

## 2023-01-17 ENCOUNTER — HOSPITAL ENCOUNTER (OUTPATIENT)
Dept: MAMMOGRAPHY | Facility: HOSPITAL | Age: 48
Discharge: HOME | End: 2023-01-17
Admitting: NURSE PRACTITIONER
Payer: COMMERCIAL

## 2023-01-17 DIAGNOSIS — Z12.31 SCREENING MAMMOGRAM FOR HIGH-RISK PATIENT: ICD-10-CM

## 2023-01-17 PROCEDURE — 77067 SCR MAMMO BI INCL CAD: CPT

## 2023-01-17 PROCEDURE — 77063 BREAST TOMOSYNTHESIS BI: CPT

## 2023-01-24 ENCOUNTER — TRANSCRIBE ORDERS (OUTPATIENT)
Dept: ADMINISTRATIVE | Facility: HOSPITAL | Age: 48
End: 2023-01-24
Payer: COMMERCIAL

## 2023-01-24 DIAGNOSIS — R92.8 ABNORMAL MAMMOGRAM: Primary | ICD-10-CM

## 2023-02-02 ENCOUNTER — HOSPITAL ENCOUNTER (OUTPATIENT)
Dept: ULTRASOUND IMAGING | Facility: HOSPITAL | Age: 48
Discharge: HOME | End: 2023-02-02
Payer: COMMERCIAL

## 2023-02-02 ENCOUNTER — HOSPITAL ENCOUNTER (OUTPATIENT)
Dept: MAMMOGRAPHY | Facility: HOSPITAL | Age: 48
Discharge: HOME | End: 2023-02-02
Payer: COMMERCIAL

## 2023-02-02 DIAGNOSIS — R92.8 ABNORMAL MAMMOGRAM: ICD-10-CM

## 2023-02-02 PROCEDURE — G0279 TOMOSYNTHESIS, MAMMO: HCPCS

## 2023-02-02 PROCEDURE — 77065 DX MAMMO INCL CAD UNI: CPT

## 2023-02-02 PROCEDURE — 76642 ULTRASOUND BREAST LIMITED: CPT

## 2023-02-08 ENCOUNTER — TRANSCRIBE ORDERS (OUTPATIENT)
Dept: ADMINISTRATIVE | Facility: HOSPITAL | Age: 48
End: 2023-02-08
Payer: COMMERCIAL

## 2023-02-08 DIAGNOSIS — Z09 FOLLOW-UP EXAM, 3-6 MONTHS SINCE PREVIOUS EXAM: Primary | ICD-10-CM

## 2023-02-08 DIAGNOSIS — R92.8 ABNORMAL MAMMOGRAM: Primary | ICD-10-CM

## 2023-02-09 ENCOUNTER — TRANSCRIBE ORDERS (OUTPATIENT)
Dept: ADMINISTRATIVE | Facility: HOSPITAL | Age: 48
End: 2023-02-09
Payer: COMMERCIAL

## 2023-02-09 DIAGNOSIS — R92.8 ABNORMAL SCREENING MAMMOGRAM: Primary | ICD-10-CM

## 2023-04-17 ENCOUNTER — HOSPITAL ENCOUNTER (EMERGENCY)
Facility: HOSPITAL | Age: 48
Discharge: HOME | End: 2023-04-17
Attending: EMERGENCY MEDICINE | Admitting: EMERGENCY MEDICINE
Payer: OTHER MISCELLANEOUS

## 2023-04-17 VITALS
SYSTOLIC BLOOD PRESSURE: 176 MMHG | TEMPERATURE: 98.8 F | HEIGHT: 59 IN | WEIGHT: 223.33 LBS | RESPIRATION RATE: 20 BRPM | HEART RATE: 85 BPM | OXYGEN SATURATION: 98 % | DIASTOLIC BLOOD PRESSURE: 98 MMHG | BODY MASS INDEX: 45.02 KG/M2

## 2023-04-17 DIAGNOSIS — S16.1XXA STRAIN OF NECK MUSCLE, INITIAL ENCOUNTER: Primary | ICD-10-CM

## 2023-04-17 PROCEDURE — 25010000002 KETOROLAC TROMETHAMINE PER 15 MG: Performed by: EMERGENCY MEDICINE

## 2023-04-17 PROCEDURE — 99283 EMERGENCY DEPT VISIT LOW MDM: CPT

## 2023-04-17 PROCEDURE — 96372 THER/PROPH/DIAG INJ SC/IM: CPT

## 2023-04-17 RX ORDER — CYCLOBENZAPRINE HCL 10 MG
10 TABLET ORAL 3 TIMES DAILY
Qty: 20 TABLET | Refills: 0 | Status: SHIPPED | OUTPATIENT
Start: 2023-04-17

## 2023-04-17 RX ORDER — CYCLOBENZAPRINE HCL 10 MG
10 TABLET ORAL ONCE
Status: COMPLETED | OUTPATIENT
Start: 2023-04-17 | End: 2023-04-17

## 2023-04-17 RX ORDER — KETOROLAC TROMETHAMINE 30 MG/ML
60 INJECTION, SOLUTION INTRAMUSCULAR; INTRAVENOUS ONCE
Status: COMPLETED | OUTPATIENT
Start: 2023-04-17 | End: 2023-04-17

## 2023-04-17 RX ORDER — KETOROLAC TROMETHAMINE 10 MG/1
10 TABLET, FILM COATED ORAL EVERY 6 HOURS PRN
Qty: 15 TABLET | Refills: 0 | Status: SHIPPED | OUTPATIENT
Start: 2023-04-17

## 2023-04-17 RX ORDER — LOSARTAN POTASSIUM 25 MG/1
25 TABLET ORAL DAILY
COMMUNITY

## 2023-04-17 RX ORDER — OXYCODONE HYDROCHLORIDE AND ACETAMINOPHEN 5; 325 MG/1; MG/1
1 TABLET ORAL EVERY 6 HOURS PRN
Qty: 12 TABLET | Refills: 0 | Status: SHIPPED | OUTPATIENT
Start: 2023-04-17

## 2023-04-17 RX ORDER — OXYCODONE HYDROCHLORIDE AND ACETAMINOPHEN 5; 325 MG/1; MG/1
1 TABLET ORAL ONCE
Status: COMPLETED | OUTPATIENT
Start: 2023-04-17 | End: 2023-04-17

## 2023-04-17 RX ADMIN — OXYCODONE AND ACETAMINOPHEN 1 TABLET: 5; 325 TABLET ORAL at 15:27

## 2023-04-17 RX ADMIN — CYCLOBENZAPRINE 10 MG: 10 TABLET, FILM COATED ORAL at 15:27

## 2023-04-17 RX ADMIN — KETOROLAC TROMETHAMINE 60 MG: 30 INJECTION, SOLUTION INTRAMUSCULAR at 15:27

## 2023-04-17 NOTE — ED PROVIDER NOTES
Time: 2:19 PM EDT  Date of encounter:  4/17/2023  Independent Historian/Clinical History and Information was obtained by: Patient and Chart  Chief Complaint: work-related injury(s)  History is limited by: N/A  Room number: 06/06     History of Present Illness (in Patient Room at 3:02pm):  HPI  Patient is a 47 y.o. year old female who presents to the Emergency Department for neck pain from work-related injury(s). Patient has a medical history of arthritis. Patient started experiencing symptoms of neck pain approximately six days ago after heavy, lifting equipment at her workplace. Patient also confirm symptoms of right arm pain with difficulty, lifting, numbness, paresthesia. Patient says she does strenuous activity that causes her neck pain. Patient has tried Motrin (or generic ibuprofen), Tylenol (or generic acetaminophen) and “muscle relaxers” with no improvement to symptoms on any of her symptoms medications. Patient denies symptoms of fever chills.    (Attending Physician evaluation documented by Amber Zarco under supervision of Pamela Leal MD)      History of Present Illness (in Triage at 2:19pm EDT):  Patient is a 47 y.o. year old female who presents to the emergency department for evaluation of neck pain.  Pain is to the right cervical paraspinals and trapezius.  Started after heavy lifting at work.  States she has history of bulging disc.  The pain is in her arm when she lifts her arm over her head.  Has tried taking muscle relaxers, Tylenol, ibuprofen and using ice/heat without relief.  The patient reports she used to be established with the pain clinic and got injections that helped but this was 3 years ago (Franky Manzanares PA-C provider in triage 2:19 PM EDT )     Patient Care Team  Primary Care Provider: Amber Lundberg APRN    Past Medical History:     Allergies   Allergen Reactions   • Morphine Anaphylaxis     Past Medical History:   Diagnosis Date   • Anemia    • Arthritis    •  Febrile seizure     when 3 yr old   • Gastric ulcer    • Hypertension    • Seasonal allergies      Past Surgical History:   Procedure Laterality Date   • ABDOMINAL SURGERY     •  SECTION     • CLAVICLE SURGERY     • CYSTOSCOPY BLADDER STONE LITHOTRIPSY     • HYSTERECTOMY     • JOINT REPLACEMENT     • TOTAL KNEE ARTHROPLASTY Left 2021    Procedure: LEFT TOTAL KNEE ARTHROPLASTY WITH COMPUTER NAVIGATION;  Surgeon: Don Acuna MD;  Location: [facility] MAIN OR;  Service: Orthopedics;  Laterality: Left;     Family History   Problem Relation Age of Onset   • Arthritis Mother    • Osteoporosis Mother    • Diabetes Father    • Arthritis Father    • Other Sister         blood diseases       Home Medications:  Prior to Admission medications    Medication Sig Start Date End Date Taking? Authorizing Provider   aspirin  MG tablet Take 1 tablet by mouth Daily.  Start after Eliquis has completed. 21   Don Acuna MD   cetirizine (zyrTEC) 10 MG tablet Take 10 mg by mouth every night at bedtime. 21   Heidi Arias MD   fluticasone (FLONASE) 50 MCG/ACT nasal spray 1 spray into the nostril(s) as directed by provider Daily. 21   Heidi Arias MD   lisinopril (PRINIVIL,ZESTRIL) 10 MG tablet Take 10 mg by mouth Daily.    ProviderHeidi MD        Social History:   Social History     Tobacco Use   • Smoking status: Every Day     Packs/day: 0.50     Years: 27.00     Pack years: 13.50     Types: Cigarettes   • Smokeless tobacco: Never   Vaping Use   • Vaping Use: Never used   Substance Use Topics   • Alcohol use: Yes     Comment: rarely   • Drug use: Never         Review of Systems:  Review of Systems   Constitutional: Negative for chills and fever.   HENT: Negative.    Eyes: Negative.    Respiratory: Negative.    Cardiovascular: Negative.    Gastrointestinal: Negative.    Endocrine: Negative.    Genitourinary: Negative.    Musculoskeletal: Positive for neck pain (right side).  "  Skin: Negative.    Allergic/Immunologic: Negative.    Neurological: Positive for weakness and numbness (and positive paresthesia of right extremity).   Hematological: Negative.    Psychiatric/Behavioral: Negative.    All other systems reviewed and are negative.       Physical Exam:  /98 (BP Location: Right arm, Patient Position: Sitting)   Pulse 85   Temp 98.8 °F (37.1 °C) (Oral)   Resp 20   Ht 149.9 cm (59\")   Wt 101 kg (223 lb 5.2 oz)   SpO2 98%   BMI 45.11 kg/m²     Physical Exam  Vitals and nursing note reviewed.   Constitutional:       General: She is not in acute distress.     Appearance: Normal appearance. She is not toxic-appearing.   HENT:      Head: Normocephalic and atraumatic.      Jaw: There is normal jaw occlusion.      Mouth/Throat:      Mouth: Mucous membranes are moist.   Eyes:      General: Lids are normal.      Extraocular Movements: Extraocular movements intact.      Conjunctiva/sclera: Conjunctivae normal.      Pupils: Pupils are equal, round, and reactive to light.   Cardiovascular:      Rate and Rhythm: Normal rate and regular rhythm.      Pulses: Normal pulses.      Heart sounds: Normal heart sounds.   Pulmonary:      Effort: Pulmonary effort is normal. No respiratory distress.      Breath sounds: Normal breath sounds. No wheezing or rhonchi.   Abdominal:      General: Abdomen is flat. There is no distension.      Palpations: Abdomen is soft.      Tenderness: There is no abdominal tenderness. There is no guarding or rebound.   Musculoskeletal:         General: Normal range of motion.      Cervical back: Normal range of motion and neck supple. Tenderness (cervical) present. Muscular tenderness (right ) present.      Right lower leg: No edema.      Left lower leg: No edema.   Skin:     General: Skin is warm and dry.   Neurological:      General: No focal deficit present.      Mental Status: She is alert and oriented to person, place, and time. Mental status is at baseline. "   Psychiatric:         Mood and Affect: Mood normal.         Behavior: Behavior normal.                  Procedures:  Procedures      Medical Decision Making:      Comorbidities that affect care:    Hypertension    External Notes reviewed:    Previous Clinic Note: Was seen at urgent care for evaluation of dysuria and abdominal pain      The following orders were placed and all results were independently analyzed by me:  No orders of the defined types were placed in this encounter.      Medications Given in the Emergency Department:  Medications   cyclobenzaprine (FLEXERIL) tablet 10 mg (10 mg Oral Given 4/17/23 1527)   oxyCODONE-acetaminophen (PERCOCET) 5-325 MG per tablet 1 tablet (1 tablet Oral Given 4/17/23 1527)   ketorolac (TORADOL) injection 60 mg (60 mg Intramuscular Given 4/17/23 1527)        ED Course:    The patient was initially evaluated in the triage area where orders were placed. The patient was later dispositioned by Pamela Leal MD.      The patient was advised to stay for completion of workup which includes but is not limited to communication of labs and radiological results, reassessment and plan. The patient was advised that leaving prior to disposition by a provider could result in critical findings that are not communicated to the patient.          Labs:    Lab Results (last 24 hours)     ** No results found for the last 24 hours. **           Imaging:    No Radiology Exams Resulted Within Past 24 Hours      Differential Diagnosis and Discussion:      Neck Pain: The patient presents with neck pain. My differential diagnosis includes but is not limited to acute spinal epidural abscess, acute spinal epidural bleed, meningitis, musculoskeletal neck pain, spinal fracture, and osteoarthritis.         MDM      The differential diagnosis for neck pain after overuse may include muscle strain, ligament sprain, cervical herniated disc, pinched nerve, and whiplash, among others.     A fracture is a  break in a bone. It can occur as a result of a sudden impact or trauma, such as a fall, a car accident, or a sports injury. Symptoms of a fracture include severe pain, swelling, tenderness, deformity, and the inability to bear weight or use the affected limb. Treatment for a fracture typically involves immobilizing the affected area with a cast, splint, or brace to allow the bone to heal.    A sprain is an injury to a ligament, which is the tissue that connects bones to each other at a joint. It can occur as a result of sudden twisting or bending of a joint, or from a fall or other impact. Symptoms of a sprain include pain, swelling, bruising, and difficulty moving the affected joint. Treatment for a sprain typically involves rest, ice, compression, and elevation of the affected area, as well as over-the-counter pain relievers and physical therapy to aid in recovery. In severe cases, surgery may be necessary to repair the damaged ligament.    A muscle strain, also known as a pulled muscle, occurs when a muscle is stretched or torn beyond its capacity. It often happens as a result of sudden movements, overuse, or improper technique during physical activity. Symptoms of a muscle strain include pain, swelling, stiffness, weakness, and difficulty moving the affected area. Treatment for a muscle strain typically involves rest, ice, compression, and elevation of the affected area, as well as over-the-counter pain relievers and physical therapy to aid in recovery.  Clinically I believe that this patient has a strain.  Patient was advised to rest and take the medication as prescribed.          Patient Care Considerations:    I considered ordering a cervical spine film, however the patient denies overt falls or history of trauma.      Consultants/Shared Management Plan:    None    Social Determinants of Health:    Patient is independent, reliable, and has access to care.       Disposition and Care  Coordination:    Discharged: The patient is suitable and stable for discharge with no need for consideration of observation or admission.    I have explained the patient´s condition, diagnoses and treatment plan based on the information available to me at this time. I have answered questions and addressed any concerns. The patient has a good  understanding of the patient´s diagnosis, condition, and treatment plan as can be expected at this point. The vital signs have been stable. The patient´s condition is stable and appropriate for discharge from the emergency department.      The patient will pursue further outpatient evaluation with the primary care physician or other designated or consulting physician as outlined in the discharge instructions. They are agreeable to this plan of care and follow-up instructions have been explained in detail. The patient has received these instructions in written format and have expressed an understanding of the discharge instructions. The patient is aware that any significant change in condition or worsening of symptoms should prompt an immediate return to this or the closest emergency department or call to 911.  I have explained discharge medications and the need for follow up with the patient/caretakers. This was also printed in the discharge instructions. Patient was discharged with the following medications and follow up:      Medication List      New Prescriptions    cyclobenzaprine 10 MG tablet  Commonly known as: FLEXERIL  Take 1 tablet by mouth 3 (Three) Times a Day.     ketorolac 10 MG tablet  Commonly known as: TORADOL  Take 1 tablet by mouth Every 6 (Six) Hours As Needed for Moderate Pain.     oxyCODONE-acetaminophen 5-325 MG per tablet  Commonly known as: PERCOCET  Take 1 tablet by mouth Every 6 (Six) Hours As Needed for Severe Pain.           Where to Get Your Medications      These medications were sent to [facility]/pharmacy #[id number] - [place], [place] - [address] -  [phone]  - [phone] FX  [address] [place] [address]    Hours: 24-hours Phone: [phone]   · cyclobenzaprine 10 MG tablet  · ketorolac 10 MG tablet  · oxyCODONE-acetaminophen 5-325 MG per tablet      Amber Lundberg, APRN  [address] [place] [address]  [phone]    In 2 days         Final diagnoses:   Strain of neck muscle, initial encounter        ED Disposition     ED Disposition   Discharge    Condition   Stable    Comment   --             This medical record created using voice recognition software.    Documentation assistance provided by Amber Zarco acting a scribe for Pamela Leal MD. Information recorded by the scribe was verified and validated at my direction.         Amber Zarco  04/17/23 1510       Amber Zarco  04/17/23 1511       Amber Zarco  04/17/23 1511       Pamela Leal MD  04/17/23 1220

## 2023-04-18 ENCOUNTER — TRANSCRIBE ORDERS (OUTPATIENT)
Dept: PHYSICAL THERAPY | Facility: CLINIC | Age: 48
End: 2023-04-18
Payer: COMMERCIAL

## 2023-04-18 DIAGNOSIS — S16.1XXA STRAIN OF TENDON OF NECK, INITIAL ENCOUNTER: Primary | ICD-10-CM

## 2023-04-19 ENCOUNTER — TREATMENT (OUTPATIENT)
Dept: PHYSICAL THERAPY | Facility: CLINIC | Age: 48
End: 2023-04-19
Payer: OTHER MISCELLANEOUS

## 2023-04-19 DIAGNOSIS — M62.81 MUSCLE RIGHT ARM WEAKNESS: ICD-10-CM

## 2023-04-19 DIAGNOSIS — S46.811S TRAPEZIUS STRAIN, RIGHT, SEQUELA: Primary | ICD-10-CM

## 2023-04-19 DIAGNOSIS — M25.611 SHOULDER STIFFNESS, RIGHT: ICD-10-CM

## 2023-04-19 NOTE — PROGRESS NOTES
"Occupational Therapy Initial Evaluation and Plan of Care  [address]   [place]  [address]      Patient: Beth Mullins   : 1975  Diagnosis/ICD-10 Code:  Trapezius strain, right, sequela [S46.811S]  Referring practitioner: MARCO ANTONIO Montoya  Date of Initial Visit: 2023  Today's Date: 2023  Patient seen for 1 sessions               Subjective Evaluation    History of Present Illness  Date of onset: 4/10/2023  Mechanism of injury: Patient reports that while at work as a  on April 10, 2023 she was unloading luggage and cleaning the windshield.  She subsequently developed pain in her neck and R upper trapezius region.  She states that she iced her neck and took Ibuprofen.  She reports some temporary relief but has experienced increasing pain since that time.  She went to [facility].  She was referred to [facility].  She was prescribed an antiinflammatory, a muscle relaxer and a Narcotic.  She is referred to therapy for evaluation and treatment.    PMHx:  HBP, neck injury , L clavicle resection .      Patient Occupation:    Precautions and Work Restrictions: off workPain  Current pain ratin  At best pain ratin  At worst pain rating: 10  Quality: throbbing (tearing)  Relieving factors: ice, medications and rest  Aggravating factors: lifting and outstretched reach    Social Support  Lives with: family.    Hand dominance: right    Diagnostic Tests  No diagnostic tests performed    Patient Goals  Patient goals for therapy: decreased pain, increased strength, return to sport/leisure activities, independence with ADLs/IADLs, return to work and increased motion  Patient goal: \"to get back to work\"         Quick Dash 36/55 40-59% functional limitation    Objective          Postural Observations  Seated posture: fair  Standing posture: fair        Tenderness     Additional Tenderness Details  Mild to moderate in the upper trap " region    Cervical/Thoracic Screen   Cervical range of motion within normal limits with the following exceptions: 50% limitation in R sidebending and rotation    Neurological Testing     Sensation     Shoulder     Right Shoulder   Intact: light touch    Active Range of Motion     Right Shoulder   Flexion: 120 degrees   Abduction: 100 degrees     Additional Active Range of Motion Details  Limited functional reach due to pain    Strength/Myotome Testing     Additional Strength Details  Testing deferred          Assessment & Plan     Assessment  Impairments: abnormal coordination, abnormal muscle tone, abnormal or restricted ROM, activity intolerance, impaired physical strength, lacks appropriate home exercise program and pain with function  Functional Limitations: carrying objects, lifting, sleeping, pulling, pushing, uncomfortable because of pain, reaching behind back, reaching overhead and unable to perform repetitive tasksPrognosis: good    Goals  Plan Goals:  Shoulder Pain  LTG 1  8 weeks:  Decrease pain to 1/10 to improve sleep and ADL performance  Status:  New  STG 1  4 weeks:  Decrease pain to 4/10  Status:  New    2.  Decreased shoulder AROM  LTG 2  8 weeks:  Increase shoulder AROM to 150 degrees with good functional reach into internal/external rotation to improve reach  Status: New  STG 2  4 weeks:  Increase shoulder AROM to 120 degrees  Status:  New    3.  Decreased shoulder strength  LTG 3  8 weeks:  Increase shoulder strength to 5/5 MMT to improve ability to lift, carry and handle objects  Status:  New  STG 3  4 weeks:  Good tolerance to strength testing  Status:  New    4.  Decreased functional use of the upper extremity  LTG 4  8 weeks:  Improve function score to 19/55 or better on Quick Dash   Status:  New  STG 4  4 weeks:  Improve function score to 27/55 or better on Quick Dash  Status:  New    Plan  Planned modality interventions: iontophoresis, contrast bath immersion, cryotherapy, electrical  stimulation/Russian stimulation, hydrotherapy, TENS, thermotherapy (hydrocollator packs), thermotherapy (paraffin bath) and ultrasound  Planned therapy interventions: ADL retraining, balance/weight-bearing training, body mechanics training, compression, fine motor coordination training, flexibility, manual therapy, neuromuscular re-education, motor coordination training, orthotic fitting/training, postural training, soft tissue mobilization, spinal/joint mobilization, strengthening, stretching, IADL retraining, home exercise program and functional ROM exercises  Frequency: 2x week  Duration in weeks: 8  Treatment plan discussed with: patient  Plan details: Reviewed use of heat and cold modalities, Instructed in cervical and postural correction exercises.        Patient will be seen for skilled OT services    Visit Diagnoses:    ICD-10-CM ICD-9-CM   1. Trapezius strain, right, sequela  S46.811S 905.7   2. Shoulder stiffness, right  M25.611 719.51   3. Muscle right arm weakness  M62.81 728.87       Timed:  Manual Therapy:                 0     mins  47045  Therapeutic Exercise:      0     mins  13224     Neuromuscular reeducation       0     mins 80327  Therapeutic Activity              0     mins  35661  Ultrasound:                  0     mins  31181     Untimed:  Low eval:                       50     mins  73396  Mod eval                        0     mins  72277  Electrical Stimulation:    0     mins  99808 ( );  Fuidotherapy     0     mins  63134      Timed Treatment:   0   mins   Total Treatment:     50   mins    OT SIGNATURE: Matthew Padilla OT MADHU  Electronic Signature  [place] license #: [id number]      Initial Certification  Certification Period: 4/19/2023 thru 7/17/2023  I certify that the therapy services are furnished while this patient is under my care.  The services outlined above are required by this patient, and will be reviewed every 90 days.     PHYSICIAN: Jos Wynn PA   NPI: [id number]     DATE:        Please sign and return via fax to [phone].. Thank you, [facility].

## 2023-04-21 ENCOUNTER — TREATMENT (OUTPATIENT)
Dept: PHYSICAL THERAPY | Facility: CLINIC | Age: 48
End: 2023-04-21
Payer: OTHER MISCELLANEOUS

## 2023-04-21 DIAGNOSIS — M62.81 MUSCLE RIGHT ARM WEAKNESS: ICD-10-CM

## 2023-04-21 DIAGNOSIS — M25.611 SHOULDER STIFFNESS, RIGHT: ICD-10-CM

## 2023-04-21 DIAGNOSIS — S46.811S TRAPEZIUS STRAIN, RIGHT, SEQUELA: Primary | ICD-10-CM

## 2023-04-21 NOTE — PROGRESS NOTES
Occupational Therapy Daily Treatment Note  [place]: [address] [place]  [place] [address]      Patient: Beth Mullins   : 1975  Diagnosis/ICD-10 Code:  Trapezius strain, right, sequela [S46.811S]  Referring practitioner: MARCO ANTONIO Montoya  Date of Initial Visit: Type: THERAPY  Noted: 2023  Today's Date: 2023  Patient seen for 2 sessions             Subjective   Beth Mullins reports: 8-10 pain R upper trap.    Objective     See Exercise, Manual, and Modality Logs for complete treatment.       Assessment/Plan  Pt tolerated well with no c/o increased pain this date.  Progress per Plan of Care           Timed:  Manual Therapy:    8     mins  43052;  Therapeutic Exercise:    11     mins  20976;       Therapeutic Activity:     8     mins  14134;       Timed Treatment:   27   mins   Total Treatment:     27   mins        Brandon Carter OT  Occupational Therapist  [place] License: [id number]  NPI: [id number]

## 2023-04-25 ENCOUNTER — TREATMENT (OUTPATIENT)
Dept: PHYSICAL THERAPY | Facility: CLINIC | Age: 48
End: 2023-04-25
Payer: OTHER MISCELLANEOUS

## 2023-04-25 DIAGNOSIS — S46.811S TRAPEZIUS STRAIN, RIGHT, SEQUELA: Primary | ICD-10-CM

## 2023-04-25 DIAGNOSIS — M62.81 MUSCLE RIGHT ARM WEAKNESS: ICD-10-CM

## 2023-04-25 DIAGNOSIS — M25.611 SHOULDER STIFFNESS, RIGHT: ICD-10-CM

## 2023-04-25 NOTE — PROGRESS NOTES
"Occupational Therapy Daily Treatment Note  [address]   [place]  [address]      Patient: Beth Mullins   : 1975  Referring practitioner: MARCO ANTONIO Montoya  Date of Initial Visit: Type: THERAPY  Noted: 2023  Today's Date: 2023  Patient seen for 3 sessions           Subjective Evaluation    History of Present Illness  Date of onset: 4/10/2023  Mechanism of injury: pain in her neck and R upper trapezius region.        PMHx:  HBP, neck injury , L clavicle resection .    Subjective comment: Reports that she is still having pain and her antiinflammatories are not helping.  She states that she had to take her Tramadol and oxycodone and states that this is helping some.  Patient Occupation:    Precautions and Work Restrictions: off workPain  Current pain ratin  Quality: pulling and throbbing (tearing)    Social Support  Lives with: family.    Diagnostic Tests  No diagnostic tests performed    Patient Goals  Patient goal: \"to get back to work\"           Objective   See Exercise, Manual, and Modality Logs for complete treatment.       Assessment & Plan     Assessment    Assessment details: Persistent pain complaints.  Able to tolerate exercises without difficulty.  Yellow theraband provided for home use.  Continue with plan of care.        Visit Diagnoses:    ICD-10-CM ICD-9-CM   1. Trapezius strain, right, sequela  S46.811S 905.7   2. Shoulder stiffness, right  M25.611 719.51   3. Muscle right arm weakness  M62.81 728.87       Progress per Plan of Care           Timed:  Manual Therapy:                 0     mins  53764  Therapeutic Exercise:      20     mins  42278     Neuromuscular reeducation       0     mins 02268  Therapeutic Activity              8     mins  92494  Ultrasound:                  0     mins  25142     Untimed:  Electrical Stimulation:    0     mins  72458 ( );  Fluidotherapy      0     mins  87250    Timed Treatment:   28   mins   Total " Treatment:     28   mins    Matthew Padilla OT, CHT  Occupational Therapist    Electronically signed      [place] license #: [id number]

## 2023-04-27 ENCOUNTER — TREATMENT (OUTPATIENT)
Dept: PHYSICAL THERAPY | Facility: CLINIC | Age: 48
End: 2023-04-27
Payer: OTHER MISCELLANEOUS

## 2023-04-27 DIAGNOSIS — M25.611 SHOULDER STIFFNESS, RIGHT: ICD-10-CM

## 2023-04-27 DIAGNOSIS — M62.81 MUSCLE RIGHT ARM WEAKNESS: ICD-10-CM

## 2023-04-27 DIAGNOSIS — S46.811S TRAPEZIUS STRAIN, RIGHT, SEQUELA: Primary | ICD-10-CM

## 2023-04-27 NOTE — PROGRESS NOTES
"Occupational Therapy Daily Treatment Note  [address] [facility]   [place]  [address]      Patient: Beth Mullins   : 1975  Referring practitioner: MARCO ANTONIO Montoya  Date of Initial Visit: Type: THERAPY  Noted: 2023  Today's Date: 2023  Patient seen for 4 sessions           Subjective Evaluation    History of Present Illness  Date of onset: 4/10/2023  Mechanism of injury: pain in her neck and R upper trapezius region.        PMHx:  HBP, neck injury , L clavicle resection .    Subjective comment: States that she is feeling a little better.  Patient Occupation:    Precautions and Work Restrictions: off workPain  Current pain rating: 3  Quality: tearing.    Social Support  Lives with: family.    Diagnostic Tests  No diagnostic tests performed    Patient Goals  Patient goal: \"to get back to work\"           Objective   See Exercise, Manual, and Modality Logs for complete treatment.       Assessment & Plan     Assessment    Assessment details: Increased tolerance to exercises today with decreased complaints of pain.  Continue with plan of care.        Visit Diagnoses:    ICD-10-CM ICD-9-CM   1. Trapezius strain, right, sequela  S46.811S 905.7   2. Shoulder stiffness, right  M25.611 719.51   3. Muscle right arm weakness  M62.81 728.87       Progress strengthening /stabilization /functional activity           Timed:  Manual Therapy:                 0     mins  37773  Therapeutic Exercise:      15     mins  29541     Neuromuscular reeducation       0     mins 48530  Therapeutic Activity              8     mins  96606  Ultrasound:                  0     mins  67064     Untimed:  Electrical Stimulation:    0     mins  06213 ( );  Fluidotherapy      0     mins  24938    Timed Treatment:   28   mins   Total Treatment:     28   mins    Matthew Padilla OT, CHT  Occupational Therapist    Electronically signed      [place] license #: [id number]  "

## 2023-05-02 ENCOUNTER — TREATMENT (OUTPATIENT)
Dept: PHYSICAL THERAPY | Facility: CLINIC | Age: 48
End: 2023-05-02
Payer: OTHER MISCELLANEOUS

## 2023-05-02 DIAGNOSIS — M25.611 SHOULDER STIFFNESS, RIGHT: ICD-10-CM

## 2023-05-02 DIAGNOSIS — M62.81 MUSCLE RIGHT ARM WEAKNESS: ICD-10-CM

## 2023-05-02 DIAGNOSIS — S46.811S TRAPEZIUS STRAIN, RIGHT, SEQUELA: Primary | ICD-10-CM

## 2023-05-02 NOTE — PROGRESS NOTES
"Occupational Therapy Daily Treatment Note  [address] [facility] [address] [address]   [place]  [address]      Patient: Beth Mullins   : 1975  Referring practitioner: MARCO ANTONIO Montoya  Date of Initial Visit: Type: THERAPY  Noted: 2023  Today's Date: 2023  Patient seen for 5 sessions           Subjective Evaluation    History of Present Illness  Date of onset: 4/10/2023  Mechanism of injury: pain in her neck and R upper trapezius region.        PMHx:  HBP, neck injury , L clavicle resection .    Subjective comment: States that her pain level remains the same.  Saw MD.  Instructed to continue therapy and return in 2 weeks.  Patient Occupation:    Precautions and Work Restrictions: off workPain  Current pain rating: 3  Quality: tearing.    Social Support  Lives with: family.    Diagnostic Tests  No diagnostic tests performed    Patient Goals  Patient goal: \"to get back to work\"           Objective   See Exercise, Manual, and Modality Logs for complete treatment.       Assessment & Plan     Assessment    Assessment details: Good tolerance to manual therapy and there ex.  Continue with plan of care.        Visit Diagnoses:    ICD-10-CM ICD-9-CM   1. Trapezius strain, right, sequela  S46.811S 905.7   2. Shoulder stiffness, right  M25.611 719.51   3. Muscle right arm weakness  M62.81 728.87       Progress per Plan of Care           Timed:  Manual Therapy:                 8     mins  01707  Therapeutic Exercise:      10     mins  46407     Neuromuscular reeducation       0     mins 64524  Therapeutic Activity              0     mins  94968  Ultrasound:                  8     mins  58797     Untimed:  Electrical Stimulation:    0     mins  11308 ( );  Fluidotherapy      0     mins  75581    Timed Treatment:   26   mins   Total Treatment:     26   mins    Matthew Padilla OT, CHT  Occupational Therapist    Electronically signed      [place] license #: [id number]  "

## 2023-05-09 ENCOUNTER — TREATMENT (OUTPATIENT)
Dept: PHYSICAL THERAPY | Facility: CLINIC | Age: 48
End: 2023-05-09
Payer: OTHER MISCELLANEOUS

## 2023-05-09 DIAGNOSIS — S46.811S TRAPEZIUS STRAIN, RIGHT, SEQUELA: Primary | ICD-10-CM

## 2023-05-09 DIAGNOSIS — M62.81 MUSCLE RIGHT ARM WEAKNESS: ICD-10-CM

## 2023-05-09 DIAGNOSIS — M25.611 SHOULDER STIFFNESS, RIGHT: ICD-10-CM

## 2023-05-09 NOTE — PROGRESS NOTES
"Occupational Therapy Daily Treatment Note  [address] [facility]   [place]  [address]      Patient: Beth Mullins   : 1975  Referring practitioner: MARCO ANTONIO Montoya  Date of Initial Visit: Type: THERAPY  Noted: 2023  Today's Date: 2023  Patient seen for 6 sessions           Subjective Evaluation    History of Present Illness  Date of onset: 4/10/2023  Mechanism of injury: pain in her neck and R upper trapezius region.        PMHx:  HBP, neck injury , L clavicle resection .    Subjective comment: States that she is feeling a little better.  Patient Occupation:    Precautions and Work Restrictions: off workPain  Current pain ratin  Quality: tearing.    Social Support  Lives with: family.    Diagnostic Tests  No diagnostic tests performed    Patient Goals  Patient goal: \"to get back to work\"           Objective   See Exercise, Manual, and Modality Logs for complete treatment.       Assessment & Plan     Assessment    Assessment details: Good tolerance to exercises.  Pain level decreasing.  Continue with plan of care.        Visit Diagnoses:    ICD-10-CM ICD-9-CM   1. Trapezius strain, right, sequela  S46.811S 905.7   2. Shoulder stiffness, right  M25.611 719.51   3. Muscle right arm weakness  M62.81 728.87       Progress strengthening /stabilization /functional activity           Timed:  Manual Therapy:                 8     mins  69813  Therapeutic Exercise:      10     mins  17314     Neuromuscular reeducation       0     mins 97037  Therapeutic Activity              8     mins  08179  Ultrasound:                  0     mins  98070     Untimed:  Electrical Stimulation:    0     mins  55968 ( );  Fluidotherapy      0     mins  72140    Timed Treatment:   26   mins   Total Treatment:     26   mins    Matthew Padilla OT, CHT  Occupational Therapist    Electronically signed      [place] license #: [id number]  "

## 2023-05-11 ENCOUNTER — TREATMENT (OUTPATIENT)
Dept: PHYSICAL THERAPY | Facility: CLINIC | Age: 48
End: 2023-05-11
Payer: OTHER MISCELLANEOUS

## 2023-05-11 DIAGNOSIS — M25.611 SHOULDER STIFFNESS, RIGHT: ICD-10-CM

## 2023-05-11 DIAGNOSIS — S46.811S TRAPEZIUS STRAIN, RIGHT, SEQUELA: Primary | ICD-10-CM

## 2023-05-11 DIAGNOSIS — M62.81 MUSCLE RIGHT ARM WEAKNESS: ICD-10-CM

## 2023-05-11 NOTE — LETTER
"Patient: Beth Mullins   : 1975  Diagnosis/ICD-10 Code:  Trapezius strain, right, sequela [S46.811S]  Referring practitioner: MARCO ANTONIO Montoya  Date of Initial Visit: Type: THERAPY  Noted: 2023  Today's Date: 2023  Patient seen for 7 sessions      Subjective:     Quick Dash 24/55 20-39% functional limitation  Clinical Progress: improved  Home Program Compliance: Yes  Treatment has included: therapeutic exercise, manual therapy and therapeutic activity    Subjective Evaluation    History of Present Illness  Date of onset: 4/10/2023  Mechanism of injury: pain in her neck and R upper trapezius region.        PMHx:  HBP, neck injury , L clavicle resection .    Subjective comment: States that she is feeling like she may be ready for HEP and return to work.  Patient Occupation:    Precautions and Work Restrictions: off workPain  Current pain ratin  Quality: tearing.    Social Support  Lives with: family.    Diagnostic Tests  No diagnostic tests performed    Patient Goals  Patient goal: \"to get back to work\"       Objective          Postural Observations  Seated posture: fair  Standing posture: good  Correction of posture: makes symptoms better        Tenderness     Additional Tenderness Details  Mild in the upper trap region.  Reports 50% improvement    Cervical/Thoracic Screen   Cervical range of motion within normal limits with the following exceptions: 25% limitation in R sidebending and rotation    Neurological Testing     Sensation     Shoulder     Right Shoulder   Intact: light touch    Active Range of Motion   Left Shoulder   Normal active range of motion    Right Shoulder   Normal active range of motion    Additional Active Range of Motion Details  Limited functional reach due to pain    Scapular Mobility   Left Shoulder   Scapular mobility: good    Right Shoulder   Scapular mobility: good    Strength/Myotome Testing     Left Shoulder   Normal muscle " strength    Right Shoulder   Normal muscle strength      Assessment/Plan  Plan Goals:  Shoulder Pain  LTG 1  8 weeks:  Decrease pain to 1/10 to improve sleep and ADL performance  Status:  Not met  STG 1  4 weeks:  Decrease pain to 4/10  Status:  Met    2.  Decreased shoulder AROM  LTG 2  8 weeks:  Increase shoulder AROM to 150 degrees with good functional reach into internal/external rotation to improve reach  Status: Met  STG 2  4 weeks:  Increase shoulder AROM to 120 degrees  Status:  Met    3.  Decreased shoulder strength  LTG 3  8 weeks:  Increase shoulder strength to 5/5 MMT to improve ability to lift, carry and handle objects  Status:  Met  STG 3  4 weeks:  Good tolerance to strength testing  Status:  Met    4.  Decreased functional use of the upper extremity  LTG 4  8 weeks:  Improve function score to 19/55 or better on Quick Dash   Status:  Not met  STG 4  4 weeks:  Improve function score to 27/55 or better on Quick Dash  Status:  Met  Visit Diagnoses:    ICD-10-CM ICD-9-CM   1. Trapezius strain, right, sequela  S46.811S 905.7   2. Shoulder stiffness, right  M25.611 719.51   3. Muscle right arm weakness  M62.81 728.87       Progress toward previous goals: All Met except LTG for pain and function       Recommendations: Continue with recommendations Progress to HEP.  Timeframe: 1 week  Prognosis to achieve goals: good    OT Signature: Matthew Padilla OT, CHT    Electronically signed    [place] license #: [id number]    Based upon review of the patient's progress and continued therapy plan, it is my medical opinion that Beth Mullins should continue physical therapy treatment at [facility]  [address] [place] [id number] [phone].    Signature: __________________________________  Jos Wynn PA  NPI: [id number]  Patient: Beth Mullins   : 1975  Diagnosis/ICD-10 Code:  Trapezius strain, right, sequela [S46.811S]  Referring practitioner: Jos Wynn  "PA  Date of Initial Visit: Type: THERAPY  Noted: 2023  Today's Date: 2023  Patient seen for 7 sessions      Subjective:     Quick Dash 24/55 20-39% functional limitation  Clinical Progress: improved  Home Program Compliance: Yes  Treatment has included: therapeutic exercise, manual therapy and therapeutic activity    Subjective Evaluation    History of Present Illness  Date of onset: 4/10/2023  Mechanism of injury: pain in her neck and R upper trapezius region.        PMHx:  HBP, neck injury , L clavicle resection .    Subjective comment: States that she is feeling like she may be ready for HEP and return to work.  Patient Occupation:    Precautions and Work Restrictions: off workPain  Current pain ratin  Quality: tearing.    Social Support  Lives with: family.    Diagnostic Tests  No diagnostic tests performed    Patient Goals  Patient goal: \"to get back to work\"       Objective          Postural Observations  Seated posture: fair  Standing posture: good  Correction of posture: makes symptoms better        Tenderness     Additional Tenderness Details  Mild in the upper trap region.  Reports 50% improvement    Cervical/Thoracic Screen   Cervical range of motion within normal limits with the following exceptions: 25% limitation in R sidebending and rotation    Neurological Testing     Sensation     Shoulder     Right Shoulder   Intact: light touch    Active Range of Motion   Left Shoulder   Normal active range of motion    Right Shoulder   Normal active range of motion    Additional Active Range of Motion Details  Limited functional reach due to pain    Scapular Mobility   Left Shoulder   Scapular mobility: good    Right Shoulder   Scapular mobility: good    Strength/Myotome Testing     Left Shoulder   Normal muscle strength    Right Shoulder   Normal muscle strength      Assessment/Plan  Plan Goals:  Shoulder Pain  LTG 1  8 weeks:  Decrease pain to 1/10 to improve sleep and " ADL performance  Status:  Not met  STG 1  4 weeks:  Decrease pain to 4/10  Status:  Met    2.  Decreased shoulder AROM  LTG 2  8 weeks:  Increase shoulder AROM to 150 degrees with good functional reach into internal/external rotation to improve reach  Status: Met  STG 2  4 weeks:  Increase shoulder AROM to 120 degrees  Status:  Met    3.  Decreased shoulder strength  LTG 3  8 weeks:  Increase shoulder strength to 5/5 MMT to improve ability to lift, carry and handle objects  Status:  Met  STG 3  4 weeks:  Good tolerance to strength testing  Status:  Met    4.  Decreased functional use of the upper extremity  LTG 4  8 weeks:  Improve function score to 19/55 or better on Quick Dash   Status:  Not met  STG 4  4 weeks:  Improve function score to 27/55 or better on Quick Dash  Status:  Met  Visit Diagnoses:    ICD-10-CM ICD-9-CM   1. Trapezius strain, right, sequela  S46.811S 905.7   2. Shoulder stiffness, right  M25.611 719.51   3. Muscle right arm weakness  M62.81 728.87       Progress toward previous goals: All Met except LTG for pain and function       Recommendations: Continue with recommendations Progress to HEP.  Timeframe: 1 week  Prognosis to achieve goals: good    OT Signature: Matthew Padilla OT, T    Electronically signed    [place] license #: [id number]    Based upon review of the patient's progress and continued therapy plan, it is my medical opinion that Beth Mullins should continue physical therapy treatment at [facility]  [address] [place] [id number] [phone].    Signature: __________________________________  Jos Wynn PA  NPI: [id number]  Patient: Beth Mullins   : 1975  Diagnosis/ICD-10 Code:  Trapezius strain, right, sequela [S46.811S]  Referring practitioner: MARCO ANTONIO Montoya  Date of Initial Visit: Type: THERAPY  Noted: 2023  Today's Date: 2023  Patient seen for 7 sessions      Subjective:     Quick Dash 24/55 20-39%  "functional limitation  Clinical Progress: improved  Home Program Compliance: Yes  Treatment has included: therapeutic exercise, manual therapy and therapeutic activity    Subjective Evaluation    History of Present Illness  Date of onset: 4/10/2023  Mechanism of injury: pain in her neck and R upper trapezius region.        PMHx:  HBP, neck injury , L clavicle resection .    Subjective comment: States that she is feeling like she may be ready for HEP and return to work.  Patient Occupation:    Precautions and Work Restrictions: off workPain  Current pain ratin  Quality: tearing.    Social Support  Lives with: family.    Diagnostic Tests  No diagnostic tests performed    Patient Goals  Patient goal: \"to get back to work\"       Objective          Postural Observations  Seated posture: fair  Standing posture: good  Correction of posture: makes symptoms better        Tenderness     Additional Tenderness Details  Mild in the upper trap region.  Reports 50% improvement    Cervical/Thoracic Screen   Cervical range of motion within normal limits with the following exceptions: 25% limitation in R sidebending and rotation    Neurological Testing     Sensation     Shoulder     Right Shoulder   Intact: light touch    Active Range of Motion   Left Shoulder   Normal active range of motion    Right Shoulder   Normal active range of motion    Additional Active Range of Motion Details  Limited functional reach due to pain    Scapular Mobility   Left Shoulder   Scapular mobility: good    Right Shoulder   Scapular mobility: good    Strength/Myotome Testing     Left Shoulder   Normal muscle strength    Right Shoulder   Normal muscle strength      Assessment/Plan  Plan Goals:  Shoulder Pain  LTG 1  8 weeks:  Decrease pain to 1/10 to improve sleep and ADL performance  Status:  Not met  STG 1  4 weeks:  Decrease pain to 4/10  Status:  Met    2.  Decreased shoulder AROM  LTG 2  8 weeks:  Increase shoulder " AROM to 150 degrees with good functional reach into internal/external rotation to improve reach  Status: Met  STG 2  4 weeks:  Increase shoulder AROM to 120 degrees  Status:  Met    3.  Decreased shoulder strength  LTG 3  8 weeks:  Increase shoulder strength to 5/5 MMT to improve ability to lift, carry and handle objects  Status:  Met  STG 3  4 weeks:  Good tolerance to strength testing  Status:  Met    4.  Decreased functional use of the upper extremity  LTG 4  8 weeks:  Improve function score to 19/55 or better on Quick Dash   Status:  Not met  STG 4  4 weeks:  Improve function score to 27/55 or better on Quick Dash  Status:  Met  Visit Diagnoses:    ICD-10-CM ICD-9-CM   1. Trapezius strain, right, sequela  S46.811S 905.7   2. Shoulder stiffness, right  M25.611 719.51   3. Muscle right arm weakness  M62.81 728.87       Progress toward previous goals: All Met except LTG for pain and function       Recommendations: Continue with recommendations Progress to HEP.  Timeframe: 1 week  Prognosis to achieve goals: good    OT Signature: Matthew Padilla OT, T    Electronically signed    [place] license #: [id number]    Based upon review of the patient's progress and continued therapy plan, it is my medical opinion that Beth Mullins should continue physical therapy treatment at [facility]  [address] [place] [id number] [phone].    Signature: __________________________________  Jos Wynn PA  NPI: [id number]  Patient: Beth Mullins   : 1975  Diagnosis/ICD-10 Code:  Trapezius strain, right, sequela [S46.811S]  Referring practitioner: MARCO ANTONIO Montoya  Date of Initial Visit: Type: THERAPY  Noted: 2023  Today's Date: 2023  Patient seen for 7 sessions      Subjective:     Quick Dash 24/55 20-39% functional limitation  Clinical Progress: improved  Home Program Compliance: Yes  Treatment has included: therapeutic exercise, manual therapy and therapeutic  "activity    Subjective Evaluation    History of Present Illness  Date of onset: 4/10/2023  Mechanism of injury: pain in her neck and R upper trapezius region.      PMHx:  HBP, neck injury , L clavicle resection .    Subjective comment: States that she is feeling like she may be ready for HEP and return to work.  Patient Occupation:    Precautions and Work Restrictions: off workPain  Current pain ratin  Quality: tearing.    Social Support  Lives with: family.    Diagnostic Tests  No diagnostic tests performed    Patient Goals  Patient goal: \"to get back to work\"       Objective      Postural Observations  Seated posture: fair  Standing posture: good  Correction of posture: makes symptoms better    Tenderness     Additional Tenderness Details  Mild in the upper trap region.  Reports 50% improvement    Cervical/Thoracic Screen   Cervical range of motion within normal limits with the following exceptions: 25% limitation in R sidebending and rotation    Neurological Testing     Sensation   Shoulder     Right Shoulder   Intact: light touch    Active Range of Motion   Left Shoulder   Normal active range of motion    Right Shoulder   Normal active range of motion    Additional Active Range of Motion Details  Limited functional reach due to pain    Scapular Mobility   Left Shoulder   Scapular mobility: good    Right Shoulder   Scapular mobility: good    Strength/Myotome Testing     Left Shoulder   Normal muscle strength    Right Shoulder   Normal muscle strength      Assessment/Plan  Plan Goals:  Shoulder Pain  LTG 1  8 weeks:  Decrease pain to 1/10 to improve sleep and ADL performance  Status:  Not met  STG 1  4 weeks:  Decrease pain to 4/10  Status:  Met    2.  Decreased shoulder AROM  LTG 2  8 weeks:  Increase shoulder AROM to 150 degrees with good functional reach into internal/external rotation to improve reach  Status: Met  STG 2  4 weeks:  Increase shoulder AROM to 120 degrees  Status:  " Met    3.  Decreased shoulder strength  LTG 3  8 weeks:  Increase shoulder strength to 5/5 MMT to improve ability to lift, carry and handle objects  Status:  Met  STG 3  4 weeks:  Good tolerance to strength testing  Status:  Met    4.  Decreased functional use of the upper extremity  LTG 4  8 weeks:  Improve function score to 19/55 or better on Quick Dash   Status:  Not met  STG 4  4 weeks:  Improve function score to 27/55 or better on Quick Dash  Status:  Met  Progress toward previous goals: All Met except LTG for pain and function       Recommendations: Continue with recommendations Progress to HEP.  Timeframe: 1 week  Prognosis to achieve goals: good    OT Signature: Matthew Padilla OT, CHT    Electronically signed    [place] license #: [id number]    Based upon review of the patient's progress and continued therapy plan, it is my medical opinion that Beth Mullins should continue physical therapy treatment at [facility]  [address] [place] [id number] [phone].    Signature: __________________________________  Jos Wynn PA  NPI: [id number]

## 2023-05-16 ENCOUNTER — TREATMENT (OUTPATIENT)
Dept: PHYSICAL THERAPY | Facility: CLINIC | Age: 48
End: 2023-05-16
Payer: OTHER MISCELLANEOUS

## 2023-05-16 DIAGNOSIS — M25.611 SHOULDER STIFFNESS, RIGHT: ICD-10-CM

## 2023-05-16 DIAGNOSIS — S46.811S TRAPEZIUS STRAIN, RIGHT, SEQUELA: Primary | ICD-10-CM

## 2023-05-16 NOTE — PROGRESS NOTES
"Occupational Therapy Daily Treatment/Discharge Note  [address] [facility] [address] [address] [address]   [place]  [address]      Patient: Beth Mullins   : 1975  Referring practitioner: MARCO ANTONIO Montoya  Date of Initial Visit: Type: THERAPY  Noted: 2023  Today's Date: 2023  Patient seen for 8 sessions           Subjective Evaluation    History of Present Illness  Date of onset: 4/10/2023  Mechanism of injury: pain in her neck and R upper trapezius region.        PMHx:  HBP, neck injury , L clavicle resection .    Subjective comment: States that she saw MD and that she will be released back to work next week.    Patient Occupation:    Precautions and Work Restrictions: off workPain  Current pain ratin  Quality: tearing.    Social Support  Lives with: family.    Diagnostic Tests  No diagnostic tests performed    Patient Goals  Patient goal: \"to get back to work\"           Objective   See Exercise, Manual, and Modality Logs for complete treatment.       Assessment/Plan  Plan Goals:  Shoulder Pain  LTG 1  8 weeks:  Decrease pain to 1/10 to improve sleep and ADL performance  Status:  Not met  STG 1  4 weeks:  Decrease pain to 4/10  Status:  Met    2.  Decreased shoulder AROM  LTG 2  8 weeks:  Increase shoulder AROM to 150 degrees with good functional reach into internal/external rotation to improve reach  Status: Met  STG 2  4 weeks:  Increase shoulder AROM to 120 degrees  Status:  Met    3.  Decreased shoulder strength  LTG 3  8 weeks:  Increase shoulder strength to 5/5 MMT to improve ability to lift, carry and handle objects  Status:  Met  STG 3  4 weeks:  Good tolerance to strength testing  Status:  Met    4.  Decreased functional use of the upper extremity  LTG 4  8 weeks:  Improve function score to 19/55 or better on Quick Dash   Status:  Not met  STG 4  4 weeks:  Improve function score to 27/55 or better on Quick Dash  Status:  Met    Visit Diagnoses:    ICD-10-CM ICD-9-CM   1. Trapezius " strain, right, sequela  S46.811S 905.7   2. Shoulder stiffness, right  M25.611 719.51       Discharge to [facility] and return to work.           Timed:  Manual Therapy:                 8     mins  24774  Therapeutic Exercise:      10     mins  55072     Neuromuscular reeducation       0     mins 64277  Therapeutic Activity              8     mins  54010  Ultrasound:                  0     mins  59926     Untimed:  Electrical Stimulation:    0     mins  16339 ( );  Fluidotherapy      0     mins  70631    Timed Treatment:   26   mins   Total Treatment:     26   mins    Matthew Padilla OT, ANGUS  Occupational Therapist    Electronically signed      [place] license #: [id number]

## 2024-07-24 ENCOUNTER — OFFICE VISIT (OUTPATIENT)
Dept: ORTHOPEDIC SURGERY | Facility: CLINIC | Age: 49
End: 2024-07-24
Payer: COMMERCIAL

## 2024-07-24 VITALS — BODY MASS INDEX: 43.95 KG/M2 | HEIGHT: 59 IN | WEIGHT: 218 LBS

## 2024-07-24 DIAGNOSIS — M17.11 OSTEOARTHRITIS OF RIGHT KNEE, UNSPECIFIED OSTEOARTHRITIS TYPE: ICD-10-CM

## 2024-07-24 DIAGNOSIS — M25.561 RIGHT KNEE PAIN, UNSPECIFIED CHRONICITY: Primary | ICD-10-CM

## 2024-07-24 RX ORDER — TRIAMCINOLONE ACETONIDE 40 MG/ML
40 INJECTION, SUSPENSION INTRA-ARTICULAR; INTRAMUSCULAR
Status: COMPLETED | OUTPATIENT
Start: 2024-07-24 | End: 2024-07-24

## 2024-07-24 RX ORDER — LIDOCAINE HYDROCHLORIDE 10 MG/ML
5 INJECTION, SOLUTION INFILTRATION; PERINEURAL
Status: COMPLETED | OUTPATIENT
Start: 2024-07-24 | End: 2024-07-24

## 2024-07-24 RX ADMIN — TRIAMCINOLONE ACETONIDE 40 MG: 40 INJECTION, SUSPENSION INTRA-ARTICULAR; INTRAMUSCULAR at 11:24

## 2024-07-24 RX ADMIN — LIDOCAINE HYDROCHLORIDE 5 ML: 10 INJECTION, SOLUTION INFILTRATION; PERINEURAL at 11:24

## 2024-07-24 NOTE — PROGRESS NOTES
"Chief Complaint  Initial Evaluation of the Right Knee     Subjective      Beth Mullins presents to [facility] for initial evaluation of the right knee. She has had pain for awhile.  She has used anti inflammatory and had a MRI and X ray.  She has pain with stooping, squatting and stairs.  She has had no recent injury or fall.     Allergies   Allergen Reactions    Morphine Anaphylaxis    Amlodipine Rash        Social History     Socioeconomic History    Marital status:    Tobacco Use    Smoking status: Every Day     Current packs/day: 0.50     Average packs/day: 0.5 packs/day for 27.0 years (13.5 ttl pk-yrs)     Types: Cigarettes    Smokeless tobacco: Never   Vaping Use    Vaping status: Never Used   Substance and Sexual Activity    Alcohol use: Yes     Comment: rarely    Drug use: Never    Sexual activity: Defer        I reviewed the patient's chief complaint, history of present illness, review of systems, past medical history, surgical history, family history, social history, medications, and allergy list.     Review of Systems     Constitutional: Denies fevers, chills, weight loss  Cardiovascular: Denies chest pain, shortness of breath  Skin: Denies rashes, acute skin changes  Neurologic: Denies headache, loss of consciousness        Vital Signs:   Ht 149.9 cm (59\")   Wt 98.9 kg (218 lb)   BMI 44.03 kg/m²          Physical Exam  General: Alert. No acute distress    Ortho Exam        RIGHT KNEE Flexion 125. Extension 0. Stable to varus/valgus stress. Stable to anterior/posterior drawer. Neurovascularly intact. Pain with Amna. Negative Lachman. Positive EHL, FHL, HS and TA. Sensation intact to light touch all 5 nerves of the foot. Ambulates with Non-antalgic gait. Patella is well tracking. Calf supple, non-tender. Positive tenderness to the medial joint line. Positive tenderness to the lateral joint line. Positive Crepitus. Good strength to hamstrings, quadriceps, " dorsiflexors, and plantar flexors.  Knee Extensor Mechanism intact Pain under the patella.        Large Joint Arthrocentesis: L knee  Date/Time: 7/24/2024 11:24 AM  Consent given by: patient  Site marked: site marked  Timeout: Immediately prior to procedure a time out was called to verify the correct patient, procedure, equipment, support staff and site/side marked as required   Supporting Documentation  Indications: pain   Procedure Details  Location: knee - L knee  Needle gauge: 21 G.  Medications administered: 5 mL lidocaine 1 %; 40 mg triamcinolone acetonide 40 MG/ML  Patient tolerance: patient tolerated the procedure well with no immediate complications      This injection documentation was Scribed for Don Acuna MD by Heather Padilla.  07/24/24   11:24 EDT      Imaging Results (Most Recent)       None             Result Review :    [facility] 6/26/24     PROCEDURE: MRI KNEE WO CONTRAST 02242  REASON FOR EXAM: Knee pain, injury  COMPARISON: None  TECHNIQUE: Multiplanar unenhanced MR imaging of the right knee.  FINDINGS: Bone marrow: Within normal limits. Some minimal reactive marrow edema present on the central aspect of the tibia  around the tibial spines Soft tissues: Within normal limits. Subcutaneous edema around the anterior aspects of the knee. Joint  fluid/bursa: Mild joint effusion with synovitis. Septated synovial cyst herniating through the posterior joint capsule measures up to  a maximum diameter of at least 3 cm..  Medial compartment: Moderate to severe cartilage loss diffusely across the weight-bearing surface of the femoral condyle.  Peripheral tibial plateau cartilage appears to be at least moderately to severely thinned especially anteriorly and medially. Meniscus  demonstrates a vertical disruption of the posterior horn root attachment air complete. There is no meniscal extrusion at this point.  There is other degenerative signal in the posterior horn.  Lateral compartment: There is moderate  cartilage thinning involving the anterior aspect of the tibial plateau. Femoral condylar  cartilage overall is maintained. There is mild bony spurring. Lateral meniscus appears intact.  Patellofemoral interval: Anatomically aligned but narrowed with extensive bony spurring. Severe cartilage loss diffusely across the  majority of the central and lateral aspects of the patellar surface as well as diffusely across the majority of the trochlear surface.  Extensor mechanism: Intact. Cruciate ligaments: Intact. MCL: Intact. Lateral collateral ligament complex: Intact. Posterolateral  corner structures: Intact.  IMPRESSION: 1. Near complete or possibly complete disruption of the posterior horn root attachment of medial meniscus. No  meniscal extrusion. 2. Tricompartmental chondrosis with joint effusion and mild synovitis. Findings most severe in the  patellofemoral interval.            Assessment and Plan     Diagnoses and all orders for this visit:    1. Right knee pain, unspecified chronicity (Primary)    2. Osteoarthritis of right knee, unspecified osteoarthritis type        Discussed the treatment plan with the patient. I reviewed the MRI results with the patient. X rays from [facility] imaging.     Discussed the risks and benefits of conservative measures.  The patient expressed understanding and wished to proceed with a right knee steroid injection.  She tolerated the injection well. .    Educated on risk of smoking/nicotine. Discussed options for smoking cessation. and Call or return if worsening symptoms.    Follow Up     PRN      Patient was given instructions and counseling regarding her condition or for health maintenance advice. Please see specific information pulled into the AVS if appropriate.     Scribed for Don Acuna MD by Rivka Crane MA.  07/24/24   11:13 EDT    I have personally performed the services described in this document as scribed by the above individual and it is both accurate and  complete. Don Acuna MD 07/24/24

## 2024-08-21 ENCOUNTER — TELEPHONE (OUTPATIENT)
Dept: ORTHOPEDIC SURGERY | Facility: CLINIC | Age: 49
End: 2024-08-21
Payer: COMMERCIAL

## 2024-08-21 NOTE — TELEPHONE ENCOUNTER
PATIENT IS REQUESTING VOLTAREN--PATIENT STATES THAT SHE WAS TAKING VOLTAREN 50MG TID FROM HER FAMILY DOCTOR.  SHE STATES DR ROMAINE IS AWARE OF WHAT SHE IS TAKING.    TITA

## 2024-09-09 NOTE — PROGRESS NOTES
"Chief Complaint  Pain of the Left Knee    Subjective          Beth Mullins presents to [facility] for s/p left total knee arthroplasty on 07/13/21 by Dr. Acuna. Patient states that she is not where she needs to be with her pain. She states she is progressing in physical therapy, but states she has leg pain and stiffness following long periods of standing and walking. She states pain is mostly on medial aspect of knee. She states she also has \"popping\" in posterior knee. She states her knee still feels warm and gets extra warm when she goes to bed at night.     Objective   Allergies   Allergen Reactions   • Morphine Anaphylaxis       Vital Signs:   Pulse 105   Ht 151.1 cm (59.5\")   Wt 99.8 kg (220 lb)   SpO2 99%   BMI 43.69 kg/m²       Physical Exam  Constitutional:       Appearance: Normal appearance. Patient is well-developed and normal weight.   HENT:      Head: Normocephalic.      Right Ear: Hearing and external ear normal.      Left Ear: Hearing and external ear normal.      Nose: Nose normal.   Eyes:      Conjunctiva/sclera: Conjunctivae normal.   Cardiovascular:      Rate and Rhythm: Normal rate.   Pulmonary:      Effort: Pulmonary effort is normal.      Breath sounds: No wheezing or rales.   Abdominal:      Palpations: Abdomen is soft.      Tenderness: There is no abdominal tenderness.   Musculoskeletal:      Cervical back: Normal range of motion.   Skin:     Findings: No rash.   Neurological:      Mental Status: Patient is alert and oriented to person, place, and time.   Psychiatric:         Mood and Affect: Mood and affect normal.         Judgment: Judgment normal.     Ortho Exam  Left knee: Well-healed scar.  Tenderness palpation over medial left knee.  No swelling, heat or redness.  No discoloration.  AROM is 0 to 115 degrees of flexion.  Patella is normal tracking.  Stable to varus/valgus stress.  Good muscle tone of the quadriceps and hamstrings muscles.  Full " plantar/dorsiflexion.  Good muscle tone of the ankle flexors.  Full weightbearing, normal gait, good heel strike.  Sensation intact, neurovascular intact, posterior tibialis pulse 2+ dorsalis pedis pulse 2+.    Result Review :   The following data was reviewed by: MARCO ANTONIO Tracey on 10/11/2021:         Imaging Results (Most Recent)     Procedure Component Value Units Date/Time    XR Knee 3 View Left [[id number]] Resulted: 10/11/21 1400     Updated: 10/11/21 1402    Narrative:      X-Ray Report:  Study: X-rays ordered, taken in the office, and reviewed today  Site: left knee Xray  Indication: left knee pain   View: AP, Lateral and Sunrise view(s)  Findings: Good alignment of left total knee arthroplasty. No signs of   hardware failure.   Prior studies available for comparison: yes                   Assessment and Plan    Problem List Items Addressed This Visit        Musculoskeletal and Injuries    Left knee pain - Primary    Relevant Orders    XR Knee 3 View Left (Completed)    S/P total knee arthroplasty, left    Current Assessment & Plan     Discussed normal healing with patient today in clinic.  We discussed normal feeling of warmth of the knee for up to 6 months following procedure.  Patient is advised to call, should redness or swelling present of her knee.  Prescription for PT was given today in order for patient to improve her strength and range of motion.  Also recommended use of modalities in PT, such as heat and TENS unit.  Patient will follow up in 3 months to reevaluate improvement.         Relevant Orders    Ambulatory Referral to Physical Therapy POST OP          Follow Up   Return in about 3 months (around 1/11/2022).  Patient Instructions   Continue physical therapy. Work to progress quadriceps strength.   Advised on falls precautions.  Continue with lifelong antibiotic prophylaxis with dental procedures following total joint replacement.  Follow up in 3 months. Please follow up sooner with any  changes or concerns.     Patient was given instructions and counseling regarding her condition or for health maintenance advice. Please see specific information pulled into the AVS if appropriate.         D&C

## 2024-11-04 ENCOUNTER — OFFICE VISIT (OUTPATIENT)
Dept: ORTHOPEDIC SURGERY | Facility: CLINIC | Age: 49
End: 2024-11-04
Payer: COMMERCIAL

## 2024-11-04 VITALS
HEART RATE: 96 BPM | WEIGHT: 218 LBS | SYSTOLIC BLOOD PRESSURE: 156 MMHG | DIASTOLIC BLOOD PRESSURE: 94 MMHG | OXYGEN SATURATION: 97 % | BODY MASS INDEX: 43.95 KG/M2 | HEIGHT: 59 IN

## 2024-11-04 DIAGNOSIS — S83.241D ACUTE MEDIAL MENISCUS TEAR OF RIGHT KNEE, SUBSEQUENT ENCOUNTER: ICD-10-CM

## 2024-11-04 DIAGNOSIS — M17.11 PRIMARY OSTEOARTHRITIS OF RIGHT KNEE: Primary | ICD-10-CM

## 2024-11-04 PROCEDURE — 20610 DRAIN/INJ JOINT/BURSA W/O US: CPT

## 2024-11-04 RX ORDER — AMLODIPINE BESYLATE 2.5 MG/1
2.5 TABLET ORAL DAILY
COMMUNITY

## 2024-11-04 RX ORDER — TOPIRAMATE 50 MG/1
TABLET, FILM COATED ORAL
COMMUNITY
Start: 2024-10-21

## 2024-11-04 RX ORDER — TRAZODONE HYDROCHLORIDE 100 MG/1
100 TABLET ORAL NIGHTLY
COMMUNITY

## 2024-11-04 RX ORDER — VENLAFAXINE 25 MG/1
25 TABLET ORAL 2 TIMES DAILY
COMMUNITY

## 2024-11-04 RX ORDER — TRIAMCINOLONE ACETONIDE 40 MG/ML
40 INJECTION, SUSPENSION INTRA-ARTICULAR; INTRAMUSCULAR
Status: COMPLETED | OUTPATIENT
Start: 2024-11-04 | End: 2024-11-04

## 2024-11-04 RX ORDER — LIDOCAINE HYDROCHLORIDE 10 MG/ML
5 INJECTION, SOLUTION INFILTRATION; PERINEURAL
Status: COMPLETED | OUTPATIENT
Start: 2024-11-04 | End: 2024-11-04

## 2024-11-04 RX ORDER — BUSPIRONE HYDROCHLORIDE 5 MG/1
5 TABLET ORAL 3 TIMES DAILY
COMMUNITY

## 2024-11-04 RX ORDER — PANTOPRAZOLE SODIUM 40 MG/1
TABLET, DELAYED RELEASE ORAL
COMMUNITY
Start: 2024-10-29

## 2024-11-04 RX ADMIN — TRIAMCINOLONE ACETONIDE 40 MG: 40 INJECTION, SUSPENSION INTRA-ARTICULAR; INTRAMUSCULAR at 13:23

## 2024-11-04 RX ADMIN — LIDOCAINE HYDROCHLORIDE 5 ML: 10 INJECTION, SOLUTION INFILTRATION; PERINEURAL at 13:23

## 2024-11-04 NOTE — PATIENT INSTRUCTIONS
Right knee steroid injection administered in office today and tolerated the procedure well without complications.  Patient advised on 3 months duration between injections.  Advised that if diabetic to closely monitor blood glucose levels over the next 24 to 48 hours.    Discussed the risk, benefits and alternatives of injection.  Discussed potential complications such as increased pain, swelling and tenderness at the injection site.  Possibility of reaction.  Possibility that injection may not improve pain. Risk of infection.  Possibility of worsening pain after injection. Steroid injections can increase blood glucose levels and may be damaged bone if given over prolonged period of time.  Patient verbalized understanding and gives verbal consent to proceed.    PA for viscosupplementation.    Follow-up upon approval for Visco.  Call with questions, concerns or worsening symptoms.

## 2024-11-04 NOTE — PROGRESS NOTES
"Chief Complaint  Follow-up of the Right Knee    Subjective      Beth Mullins presents to [facility] for follow-up of right knee pain.  Patient has osteoarthritis in the right knee and a medial meniscus tear.  Patient had an MRI in the past.  She has difficulty with stairs.  She is here today for steroid injection would like to seek approval for Visco.  Reports that the steroid injections last about a month and a half for her.    Objective   Allergies   Allergen Reactions    Morphine Anaphylaxis    Amlodipine Rash       Vital Signs:   /94   Pulse 96   Ht 149.9 cm (59\")   Wt 98.9 kg (218 lb)   SpO2 97%   BMI 44.03 kg/m²       Physical Exam    Constitutional: Awake, alert. Well nourished appearance.    Integumentary: Warm, dry, intact. No obvious rashes.    HENT: Atraumatic, normocephalic.   Respiratory: Non labored respirations .   Cardiovascular: Intact peripheral pulses.    Psychiatric: Normal mood and affect. A&O X3    Ortho Exam  Right knee: Range of motion 0 to 120 degrees.  Stable to varus and valgus stress.  Stable to anterior and posterior drawer test.  Neurovascular intact.  Sensation is intact.  Tender to palpation the medial lateral joint line.  Mild effusion noted.    Imaging Results (Most Recent)       None             Large Joint: R knee  Date/Time: 11/4/2024 1:23 PM  Consent given by: patient  Site marked: site marked  Timeout: Immediately prior to procedure a time out was called to verify the correct patient, procedure, equipment, support staff and site/side marked as required   Supporting Documentation  Indications: pain   Procedure Details  Location: knee - R knee  Preparation: Patient was prepped and draped in the usual sterile fashion  Needle gauge: 21 G.  Medications administered: 5 mL lidocaine 1 %; 40 mg triamcinolone acetonide 40 MG/ML  Patient tolerance: patient tolerated the procedure well with no immediate complications       This injection " documentation was Scribed for ORALIA Parrish by Carola Clarke MA.  11/04/24   13:23 EST        Assessment and Plan   Problem List Items Addressed This Visit    None  Visit Diagnoses       Primary osteoarthritis of right knee    -  Primary    Relevant Orders    Large Joint: R knee    Acute medial meniscus tear of right knee, subsequent encounter        Relevant Orders    Large Joint: R knee            Follow Up   Return for Recheck.    Patient is a smoker, please discuss smoking cessation options with your PCP.    Social History     Socioeconomic History    Marital status:    Tobacco Use    Smoking status: Every Day     Current packs/day: 0.50     Average packs/day: 0.5 packs/day for 27.0 years (13.5 ttl pk-yrs)     Types: Cigarettes    Smokeless tobacco: Never   Vaping Use    Vaping status: Never Used   Substance and Sexual Activity    Alcohol use: Yes     Comment: rarely    Drug use: Never    Sexual activity: Defer       Patient Instructions   Right knee steroid injection administered in office today and tolerated the procedure well without complications.  Patient advised on 3 months duration between injections.  Advised that if diabetic to closely monitor blood glucose levels over the next 24 to 48 hours.    Discussed the risk, benefits and alternatives of injection.  Discussed potential complications such as increased pain, swelling and tenderness at the injection site.  Possibility of reaction.  Possibility that injection may not improve pain. Risk of infection.  Possibility of worsening pain after injection. Steroid injections can increase blood glucose levels and may be damaged bone if given over prolonged period of time.  Patient verbalized understanding and gives verbal consent to proceed.    PA for viscosupplementation.    Follow-up upon approval for Visco.  Call with questions, concerns or worsening symptoms.    Patient was given instructions and counseling regarding her condition or for  health maintenance advice. Please see specific information pulled into the AVS if appropriate.

## 2024-11-13 ENCOUNTER — TELEPHONE (OUTPATIENT)
Dept: ORTHOPEDIC SURGERY | Facility: CLINIC | Age: 49
End: 2024-11-13
Payer: COMMERCIAL

## 2024-11-13 NOTE — TELEPHONE ENCOUNTER
APPT: RT KNEE EUFLEXXA INJ WITH JEANNETTE    12-23 AT 1:00 PM  12-30 AT 1:00 PM  1-6     AT 1:00 PM    LAST RT  KNEE STEROID INJ:  11-4    [employer] =NO PA REQ

## 2024-11-13 NOTE — TELEPHONE ENCOUNTER
----- Message from Lyndsay MARTINS sent at 11/12/2024  3:29 PM EST -----  NO PA Required for Right Knee Euflexxa.  Okay to schedule when appropriate.  Reference #:  [id number]

## 2024-12-23 ENCOUNTER — OFFICE VISIT (OUTPATIENT)
Dept: ORTHOPEDIC SURGERY | Facility: CLINIC | Age: 49
End: 2024-12-23
Payer: COMMERCIAL

## 2024-12-23 VITALS — HEIGHT: 59 IN | WEIGHT: 213 LBS | BODY MASS INDEX: 42.94 KG/M2

## 2024-12-23 DIAGNOSIS — M17.11 PRIMARY OSTEOARTHRITIS OF RIGHT KNEE: Primary | ICD-10-CM

## 2024-12-23 DIAGNOSIS — S83.241D ACUTE MEDIAL MENISCUS TEAR OF RIGHT KNEE, SUBSEQUENT ENCOUNTER: ICD-10-CM

## 2024-12-23 RX ORDER — HYALURONATE SODIUM 10 MG/ML
20 SYRINGE (ML) INTRAARTICULAR
Status: COMPLETED | OUTPATIENT
Start: 2024-12-23 | End: 2024-12-23

## 2024-12-23 RX ADMIN — Medication 20 MG: at 13:15

## 2024-12-23 NOTE — PATIENT INSTRUCTIONS
Patient received first Euflexxa injection today in office into the right knee and tolerated the procedure well without complication.  Counseled that these injections can be given every 6 months and 1 day with insurance approval. Pt can also receive steroid injections intermittently between these doses.  Steroid injections can be given every 3 months.    Discussed the risk, benefits and alternatives of injection.  Discussed potential complications such as increased pain, swelling and tenderness at the injection site.  Possibility of reaction.  Possibility that injection may not improve pain. Risk of infection.  Possibility of worsening pain after injection.  Patient verbalized understanding and gives verbal consent to proceed.     Follow up in 1 week for second Euflexxa injection. Call with questions, concerns, or worsening symptoms.

## 2024-12-23 NOTE — PROGRESS NOTES
"Chief Complaint  Follow-up of the Right Knee    Subjective      Beth Mullins presents to [facility] for follow-up of right knee osteoarthritis and medial meniscus tear.  Patient had an MRI in the past.  She has had gel injections years ago that worked well for her.  She had a steroid injection on 11/4/24 and is here today with insurance approval to receive her first Euflexxa injection.  Reports that she is putting off knee replacement.  Reports that since Wednesday she has had a difficult time bearing weight.  She denies injury.    Objective   Allergies   Allergen Reactions    Morphine Anaphylaxis    Amlodipine Rash       Vital Signs:   Ht 149.9 cm (59\")   Wt 96.6 kg (213 lb)   BMI 43.02 kg/m²       Physical Exam    Constitutional: Awake, alert. Well nourished appearance.    Integumentary: Warm, dry, intact. No obvious rashes.    HENT: Atraumatic, normocephalic.   Respiratory: Non labored respirations .   Cardiovascular: Intact peripheral pulses.    Psychiatric: Normal mood and affect. A&O X3    Ortho Exam  Right knee: Range of motion minus for extension to 110 degree flexion.  Stable to varus and valgus stress.  Stable to anterior and posterior drawer test.  Mild effusion noted.  Tender to palpation of the medial and lateral joint line.  Neurovascular intact.  Sensation is intact.    Imaging Results (Most Recent)       None             Large Joint: R knee  Date/Time: 12/23/2024 1:15 PM  Consent given by: patient  Site marked: site marked  Timeout: Immediately prior to procedure a time out was called to verify the correct patient, procedure, equipment, support staff and site/side marked as required   Supporting Documentation  Indications: pain   Procedure Details  Location: knee - R knee  Preparation: Patient was prepped and draped in the usual sterile fashion  Needle gauge: 21 G.  Medications administered: 20 mg Sodium Hyaluronate (Viscosup) 20 MG/2ML  Patient tolerance: patient " tolerated the procedure well with no immediate complications       This injection documentation was Scribed for ORALIA Parrish by Carola Clarke MA.  12/23/24   13:16 EST         Assessment and Plan   Problem List Items Addressed This Visit    None  Visit Diagnoses       Primary osteoarthritis of right knee    -  Primary    Relevant Orders    Large Joint: R knee    Acute medial meniscus tear of right knee, subsequent encounter        Relevant Orders    Large Joint: R knee            Follow Up   Return in about 1 week (around 12/30/2024).    Patient is a smoker, please discuss smoking cessation options with your PCP.    Social History     Socioeconomic History    Marital status:    Tobacco Use    Smoking status: Every Day     Current packs/day: 0.50     Average packs/day: 0.5 packs/day for 27.0 years (13.5 ttl pk-yrs)     Types: Cigarettes    Smokeless tobacco: Never   Vaping Use    Vaping status: Never Used   Substance and Sexual Activity    Alcohol use: Yes     Comment: rarely    Drug use: Never    Sexual activity: Defer       Patient Instructions   Patient received first Euflexxa injection today in office into the right knee and tolerated the procedure well without complication.  Counseled that these injections can be given every 6 months and 1 day with insurance approval. Pt can also receive steroid injections intermittently between these doses.  Steroid injections can be given every 3 months.    Discussed the risk, benefits and alternatives of injection.  Discussed potential complications such as increased pain, swelling and tenderness at the injection site.  Possibility of reaction.  Possibility that injection may not improve pain. Risk of infection.  Possibility of worsening pain after injection.  Patient verbalized understanding and gives verbal consent to proceed.     Follow up in 1 week for second Euflexxa injection. Call with questions, concerns, or worsening symptoms.    Patient was given  instructions and counseling regarding her condition or for health maintenance advice. Please see specific information pulled into the AVS if appropriate.

## 2025-01-02 ENCOUNTER — OFFICE VISIT (OUTPATIENT)
Dept: GASTROENTEROLOGY | Facility: CLINIC | Age: 50
End: 2025-01-02
Payer: COMMERCIAL

## 2025-01-02 VITALS
DIASTOLIC BLOOD PRESSURE: 97 MMHG | HEART RATE: 96 BPM | SYSTOLIC BLOOD PRESSURE: 144 MMHG | BODY MASS INDEX: 44.64 KG/M2 | WEIGHT: 221 LBS | OXYGEN SATURATION: 99 %

## 2025-01-02 DIAGNOSIS — R13.10 DYSPHAGIA, UNSPECIFIED TYPE: ICD-10-CM

## 2025-01-02 DIAGNOSIS — K64.9 HEMORRHOIDS, UNSPECIFIED HEMORRHOID TYPE: ICD-10-CM

## 2025-01-02 DIAGNOSIS — K21.9 GASTROESOPHAGEAL REFLUX DISEASE, UNSPECIFIED WHETHER ESOPHAGITIS PRESENT: Primary | ICD-10-CM

## 2025-01-02 DIAGNOSIS — K62.5 RECTAL BLEEDING: ICD-10-CM

## 2025-01-02 PROBLEM — Z12.11 ENCOUNTER FOR SCREENING FOR MALIGNANT NEOPLASM OF COLON: Status: ACTIVE | Noted: 2025-01-02

## 2025-01-02 RX ORDER — PANTOPRAZOLE SODIUM 40 MG/1
40 TABLET, DELAYED RELEASE ORAL 2 TIMES DAILY
Qty: 60 TABLET | Refills: 0 | Status: SHIPPED | OUTPATIENT
Start: 2025-01-02

## 2025-01-02 RX ORDER — SODIUM PICOSULFATE, MAGNESIUM OXIDE, AND ANHYDROUS CITRIC ACID 10; 3.5; 12 MG/160ML; G/160ML; G/160ML
175 LIQUID ORAL TAKE AS DIRECTED
Qty: 350 ML | Refills: 0 | Status: SHIPPED | OUTPATIENT
Start: 2025-01-02

## 2025-01-02 NOTE — PROGRESS NOTES
Chief Complaint     Black or Bloody Stool (Pt states she has not had blood in her stool since November. ), Difficulty Swallowing (Pt states she has difficulty swallowing food and liquids/Pt states the difficulty swallowing started when she was taking lisinopril, pt has discontinued lisinopril for this reason), Heartburn, Vomiting (When food gets stuck in pts throat she will have to vomit), and Hemorrhoids (Pt states she is having some discomfort )    History of Present Illness     Beth Mullins is a 49 y.o. female who presents to [facility] on referral from Rachana Mosher for a gastroenterology evaluation of trouble swallowing and bloody stools, heartburn, and hemorrhoids.      History of Present Illness:  2024 Initial Office Visit: Patient reports heartburn is uncontrolled despite Protonix. Denies alcohol use. Smokes 1 PPD for 20 year. Patient reports voice became hoarse over the past 2 years. She reports trouble swallowing solids and thickened liquids. Admits to vomiting to clear stuck foods. Denies nausea or painful swallowing. No reports of unintentional weight loss.    Reports her typical bowel regimen consists of 1 BM a day. Stool described as formed, sometimes hard or soft, and brown in color. Denies current hematochezia or abdominal pain. Reports last episode of bright red blood with wiping and lower abdominal pain occurred in November, that lasted 2-3 days. Denies straining during defecation. No reports of melena. Patient denies family history of colon cancer. Patient has a positive family history of colon polyps found in mother. No history of EGD/Colonoscopy.       History      Past Medical History:   Diagnosis Date    Anemia     Arthritis     Febrile seizure     when 3 yr old    Gastric ulcer     Hypertension     Seasonal allergies        Past Surgical History:   Procedure Laterality Date    ABDOMINAL SURGERY       SECTION      CLAVICLE SURGERY       CYSTOSCOPY BLADDER STONE LITHOTRIPSY      HYSTERECTOMY      JOINT REPLACEMENT      TOTAL KNEE ARTHROPLASTY Left 7/13/2021    Procedure: LEFT TOTAL KNEE ARTHROPLASTY WITH COMPUTER NAVIGATION;  Surgeon: Don Acuna MD;  Location: [facility];  Service: Orthopedics;  Laterality: Left;       Family History   Problem Relation Age of Onset    Arthritis Mother     Osteoporosis Mother     Diabetes Father     Arthritis Father     Colon cancer Neg Hx         Current Medications        Current Outpatient Medications:     amLODIPine (NORVASC) 2.5 MG tablet, Take 1 tablet by mouth Daily., Disp: , Rfl:     busPIRone (BUSPAR) 5 MG tablet, Take 1 tablet by mouth 3 (Three) Times a Day., Disp: , Rfl:     cetirizine (zyrTEC) 10 MG tablet, Take 1 tablet by mouth every night at bedtime., Disp: , Rfl:     diclofenac (VOLTAREN) 50 MG EC tablet, TAKE ONE TABLET BY MOUTH THREE TIMES A DAY, Disp: 90 tablet, Rfl: 1    fluticasone (FLONASE) 50 MCG/ACT nasal spray, Administer 1 spray into the nostril(s) as directed by provider Daily., Disp: , Rfl:     pantoprazole (PROTONIX) 40 MG EC tablet, , Disp: , Rfl:     topiramate (TOPAMAX) 50 MG tablet, , Disp: , Rfl:     traZODone (DESYREL) 100 MG tablet, Take 1 tablet by mouth Every Night., Disp: , Rfl:     aspirin  MG tablet, Take 1 tablet by mouth Daily.  Start after Eliquis has completed. (Patient not taking: Reported on 11/4/2024), Disp: 21 tablet, Rfl: 0    cyclobenzaprine (FLEXERIL) 10 MG tablet, Take 1 tablet by mouth 3 (Three) Times a Day. (Patient not taking: Reported on 1/2/2025), Disp: 20 tablet, Rfl: 0    ketorolac (TORADOL) 10 MG tablet, Take 1 tablet by mouth Every 6 (Six) Hours As Needed for Moderate Pain. (Patient not taking: Reported on 11/4/2024), Disp: 15 tablet, Rfl: 0    Sod Picosulfate-Mag Ox-Cit Acd (Clenpiq) 10-3.5-12 MG-GM -GM/160ML solution, Take 175 mL by mouth Take As Directed. As directed by office., Disp: 350 mL, Rfl: 0    venlafaxine (EFFEXOR) 25 MG  "tablet, Take 1 tablet by mouth 2 (Two) Times a Day., Disp: , Rfl:      Allergies     Allergies   Allergen Reactions    Morphine Anaphylaxis    Amlodipine Rash       Social History       Social History     Social History Narrative    Not on file       Immunizations     Immunization:    There is no immunization history on file for this patient.       Objective     Objective     Vital Signs:   /97 (BP Location: Left arm, Patient Position: Sitting, Cuff Size: Adult)   Pulse 96   Wt 100 kg (221 lb)   SpO2 99%   BMI 44.64 kg/m²       Physical Exam  HENT:      Head: Normocephalic.   Cardiovascular:      Rate and Rhythm: Normal rate.   Pulmonary:      Effort: Pulmonary effort is normal.   Musculoskeletal:         General: Normal range of motion.   Neurological:      General: No focal deficit present.      Mental Status: She is alert.   Psychiatric:         Mood and Affect: Mood normal.         Results      Result Review :   The following data was reviewed by: ORALIA Keller on 01/02/2025:    Lab Results - Last 18 Months   Lab Units 07/13/23  0136   WBC 10*3/mm3 15.15*   HEMOGLOBIN g/dL 13.7   MCV fL 89.5   PLATELETS 10*3/mm3 475*         Lab Results - Last 18 Months   Lab Units 07/13/23  0136   BUN mg/dL 16   CREATININE mg/dL 0.74   SODIUM mmol/L 136   POTASSIUM mmol/L 3.8   CHLORIDE mmol/L 104   CO2 mmol/L 20.4*   GLUCOSE mg/dL 131*      No results for input(s): \"PROTIME\", \"INR\", \"PTT\" in the last 98580 hours.  Lab Results - Last 18 Months   Lab Units 07/13/23  0136   AST (SGOT) U/L 14   ALT (SGPT) U/L 16   ALK PHOS U/L 109   BILIRUBIN mg/dL 0.2   TOTAL PROTEIN g/dL 7.1   ALBUMIN g/dL 4.2      No results for input(s): \"IRON\", \"TRANSFERRIN\", \"TIBC\", \"FERRITIN\", \"DBBVVYLY59\", \"FOLATE\" in the last 17429 hours.  No results for input(s): \"HAV\", \"HEPAIGM\", \"HEPBIGM\", \"HEPBCAB\", \"HBEAG\", \"HEPCAB\" in the last 82067 hours.    No Images in the past 120 days found..       Assessment and Plan        Assessment " and Plan    Diagnoses and all orders for this visit:    1. Gastroesophageal reflux disease, unspecified whether esophagitis present (Primary)  -     Case Request; Standing  -     Case Request  -     Sod Picosulfate-Mag Ox-Cit Acd (Clenpiq) 10-3.5-12 MG-GM -GM/160ML solution; Take 175 mL by mouth Take As Directed. As directed by office.  Dispense: 350 mL; Refill: 0    2. Hemorrhoids, unspecified hemorrhoid type    3. Dysphagia, unspecified type  -     Case Request; Standing  -     Case Request  -     Sod Picosulfate-Mag Ox-Cit Acd (Clenpiq) 10-3.5-12 MG-GM -GM/160ML solution; Take 175 mL by mouth Take As Directed. As directed by office.  Dispense: 350 mL; Refill: 0    4. Encounter for screening for malignant neoplasm of colon  -     Case Request; Standing  -     Case Request  -     Sod Picosulfate-Mag Ox-Cit Acd (Clenpiq) 10-3.5-12 MG-GM -GM/160ML solution; Take 175 mL by mouth Take As Directed. As directed by office.  Dispense: 350 mL; Refill: 0    Other orders  -     Follow Anesthesia Guidelines / Protocol; Future  -     Verify NPO; Standing  -     Verify Bowel Prep Was Successful; Standing  -     Give Tap Water Enema If Bowel Prep Insufficient; Standing        ESOPHAGOGASTRODUODENOSCOPY AND COLONOSCOPY: INSERTION OF LIGHTED INSTRUMENTS TO VIEW ESOPHAGUS, STOMACH AND SMALL INTESTINE AS WELL AS THE LARGE INTESTINE WITH POSSIBLE REMOVAL OF PROTRUDED TISSUE TO SEND FOR TESTING (N/A), ESOPHAGOGASTRODUODENOSCOPY AND COLONOSCOPY: INSERTION OF LIGHTED INSTRUMENTS TO VIEW ESOPHAGUS, STOMACH AND SMALL INTESTINE AS WELL AS THE LARGE INTESTINE WITH POSSIBLE REMOVAL OF PROTRUDED TISSUE TO SEND FOR TESTING (N/A)      Follow Up        Follow Up   No follow-ups on file.    I have recommended that the patient undergo further evaluation with an EGD and colonoscopy due to dysphagia and rectal bleeding.  I have discussed this procedure in detail with the patient.  I have discussed the risks, benefits, and alternatives.  I have  discussed the risk of anesthesia, bleeding and perforation.  Patient understands these risks, benefits, and alternatives and wishes to proceed.  I will schedule her at her earliest convenience. Clenpiq ordered.  CBC and CMP for up to date labs.     Patient was given instructions and counseling regarding her condition or for health maintenance advice. Please see specific information pulled into the AVS if appropriate.

## 2025-01-03 ENCOUNTER — PATIENT ROUNDING (BHMG ONLY) (OUTPATIENT)
Dept: GASTROENTEROLOGY | Facility: CLINIC | Age: 50
End: 2025-01-03
Payer: COMMERCIAL

## 2025-01-03 NOTE — PROGRESS NOTES
1/3/2025      Hello, may I speak with Beth Mullins     My name is aMr. I am calling from [facility]. I show that you had a recent visit with ORALIA Keller.    Before we get started may I verify your date of birth? 1975    I am calling to officially welcome you to our practice and ask about your recent visit. Is this a good time to talk?  Unable to leave voicemail, did not      Tell me about your visit with us. What things went well?    We strive to ensure that we protect your safety and privacy. Is there anything we could have done to improve this during your visit?        We're always looking for ways to make our patients' experiences even better. Do you have recommendations on ways we may improve?    Overall were you satisfied with your first visit to our practice?    I appreciate you taking the time to speak with me today. Is there anything else I can do for you?    I am glad to hear that you had a very good visit and I appreciate you taking the time to provide feedback on this call. We would greatly appreciate you filling out a survey if you receive one in the mail, email or text. This is a great opportunity to provide any additional feedback that you may think of after this call as well.       Thank you, and have a great day.

## 2025-01-07 ENCOUNTER — TELEPHONE (OUTPATIENT)
Dept: GASTROENTEROLOGY | Facility: CLINIC | Age: 50
End: 2025-01-07
Payer: COMMERCIAL

## 2025-01-07 NOTE — TELEPHONE ENCOUNTER
Beth Mullins  1975    Patient requested to Reschedule their EGD & Colonoscopy. I have offered to reschedule this patient and patient has AGREED. Patient has been rescheduled to 1/30/2025.    Reason for cancelling/rescheduling: PT REQUEST    This procedure was ordered by Colin Aguilar MD for an important reason. We want to inform you that there are risks associated with not proceeding with the procedure at this time such as a delay in diagnosis, risk of incurable disease, or cancer.    Updated clearance needed?: NO    Is the patient currently on any injectable medications for weight loss or diabetes? NO    Has endo scheduling been notified? YES    If yes, who did you speak to? DENNISE    Has the case request been updated? YES    Patient verbalized understanding for all of the above information.

## 2025-01-13 ENCOUNTER — OFFICE VISIT (OUTPATIENT)
Dept: ORTHOPEDIC SURGERY | Facility: CLINIC | Age: 50
End: 2025-01-13
Payer: COMMERCIAL

## 2025-01-13 ENCOUNTER — TELEPHONE (OUTPATIENT)
Dept: ORTHOPEDIC SURGERY | Facility: CLINIC | Age: 50
End: 2025-01-13

## 2025-01-13 VITALS
DIASTOLIC BLOOD PRESSURE: 103 MMHG | WEIGHT: 221 LBS | HEIGHT: 59 IN | OXYGEN SATURATION: 96 % | BODY MASS INDEX: 44.55 KG/M2 | HEART RATE: 108 BPM | SYSTOLIC BLOOD PRESSURE: 138 MMHG

## 2025-01-13 DIAGNOSIS — M25.562 LEFT KNEE PAIN, UNSPECIFIED CHRONICITY: Primary | ICD-10-CM

## 2025-01-13 DIAGNOSIS — M25.561 RIGHT KNEE PAIN, UNSPECIFIED CHRONICITY: ICD-10-CM

## 2025-01-13 DIAGNOSIS — S83.241D ACUTE MEDIAL MENISCUS TEAR OF RIGHT KNEE, SUBSEQUENT ENCOUNTER: ICD-10-CM

## 2025-01-13 RX ORDER — DEXAMETHASONE SODIUM PHOSPHATE 4 MG/ML
8 INJECTION, SOLUTION INTRA-ARTICULAR; INTRALESIONAL; INTRAMUSCULAR; INTRAVENOUS; SOFT TISSUE ONCE
Status: COMPLETED | OUTPATIENT
Start: 2025-01-13 | End: 2025-01-13

## 2025-01-13 RX ADMIN — Medication 20 MG: at 15:32

## 2025-01-13 RX ADMIN — DEXAMETHASONE SODIUM PHOSPHATE 8 MG: 4 INJECTION, SOLUTION INTRA-ARTICULAR; INTRALESIONAL; INTRAMUSCULAR; INTRAVENOUS; SOFT TISSUE at 16:41

## 2025-01-13 NOTE — TELEPHONE ENCOUNTER
Provider: RAMON MACHADO    Caller: Beth Mullins    Relationship to Patient: Self    Phone Number: [phone]    Reason for Call: PATIENT CALLED STATING SHE NEEDS TO SCHEDULE HER LAST TWO EUFLEXXA RIGHT KNEE INJECTIONS. WANTS TO WORKED IN SOON IF POSSIBLE. PLEASE ADVISE.

## 2025-01-13 NOTE — TELEPHONE ENCOUNTER
APPT MADE:  1-13 AT 3:00 PM EUFLEXXA #2 WITH PASTORA                          1-20 AT 2:15 PM EUFLEXXA #3 WITH PASTORA

## 2025-01-13 NOTE — PROGRESS NOTES
"Chief Complaint  Follow-up of the Right Knee    Subjective      Beth Mullins presents to [facility] for follow up of her right knee.  Patient is here today for her Euflexxa injection #2.  Overall she states she is doing okay.    Allergies   Allergen Reactions    Morphine Anaphylaxis    Amlodipine Rash       Objective     Vital Signs:   Vitals:    01/13/25 1525   BP: (!) 138/103   Pulse: 108   SpO2: 96%   Weight: 100 kg (221 lb)   Height: 149.9 cm (59.02\")     Body mass index is 44.61 kg/m².    I reviewed the patient's chief complaint, history of present illness, review of systems, past medical history, surgical history, family history, social history, medications, and allergy list.     Ortho Exam  Knee   General: Alert, no acute distress.   Right knee:  Knee stable to varus/valgus stress.  Knee extensor mechanism intact.  0 degrees knee extension. Flexion to 120 degrees. Calf soft, non-tender.  Sensation and neurovascularly intact.  Demonstrates active ankle dorsiflexion and plantarflexion.  Palpable pedal pulses.         Large Joint: R knee  Date/Time: 1/13/2025 3:32 PM  Consent given by: patient  Site marked: site marked  Timeout: Immediately prior to procedure a time out was called to verify the correct patient, procedure, equipment, support staff and site/side marked as required   Supporting Documentation  Indications: pain   Procedure Details  Location: knee - R knee  Preparation: Patient was prepped and draped in the usual sterile fashion  Needle gauge: 21 G.  Medications administered: 20 mg Sodium Hyaluronate (Viscosup) 20 MG/2ML  Patient tolerance: patient tolerated the procedure well with no immediate complications      This injection documentation was Scribed for ORALIA Paul by Rolanda Chaudhary MA.  01/13/25   15:33 EST      Imaging Results (Most Recent)       None                Assessment and Plan   Diagnoses and all orders for this visit:    1. Left knee pain, " unspecified chronicity (Primary)  -     dexAMETHasone (DECADRON) injection 8 mg    2. Right knee pain, unspecified chronicity    3. Acute medial meniscus tear of right knee, subsequent encounter    Other orders  -     Large Joint: R knee         Beth Mullins presents to [facility] for her right knee.  Patient return to clinic today to receive right knee Euflexxa injection #2.  Patient tolerated well.  Patient will follow-up in 1 week for right knee Euflexxa injection #3.        Follow Up   No follow-ups on file.  There are no Patient Instructions on file for this visit.    Patient was given instructions and counseling regarding her condition or for health maintenance advice. Please see specific information pulled into the AVS if appropriate.       Dictated Utilizing Dragon Dictation. Please note that portions of this note were completed with a voice recognition program. Part of this note may be an electronic transcription/translation of spoken language to printed text using the Dragon Dictation System.

## 2025-01-15 RX ORDER — HYALURONATE SODIUM 10 MG/ML
20 SYRINGE (ML) INTRAARTICULAR
Status: COMPLETED | OUTPATIENT
Start: 2025-01-13 | End: 2025-01-13

## 2025-01-20 ENCOUNTER — OFFICE VISIT (OUTPATIENT)
Dept: ORTHOPEDIC SURGERY | Facility: CLINIC | Age: 50
End: 2025-01-20
Payer: COMMERCIAL

## 2025-01-20 VITALS
HEART RATE: 103 BPM | BODY MASS INDEX: 42.21 KG/M2 | DIASTOLIC BLOOD PRESSURE: 90 MMHG | WEIGHT: 215 LBS | OXYGEN SATURATION: 95 % | SYSTOLIC BLOOD PRESSURE: 119 MMHG | HEIGHT: 60 IN

## 2025-01-20 DIAGNOSIS — S83.241D ACUTE MEDIAL MENISCUS TEAR OF RIGHT KNEE, SUBSEQUENT ENCOUNTER: ICD-10-CM

## 2025-01-20 DIAGNOSIS — M25.561 RIGHT KNEE PAIN, UNSPECIFIED CHRONICITY: Primary | ICD-10-CM

## 2025-01-20 DIAGNOSIS — M17.11 PRIMARY OSTEOARTHRITIS OF RIGHT KNEE: ICD-10-CM

## 2025-01-20 RX ADMIN — Medication 20 MG: at 15:29

## 2025-01-20 NOTE — PROGRESS NOTES
"Chief Complaint  Follow-up and Pain of the Right Knee     Subjective      Beth Mullins is a 49 y.o. female who presents to [facility] for follow up on her right knee. Patient has right knee osteoarthritis that we have been managing conservatively with injections. Patient's is here today for Euflexxa series shot #3 of 3.     Allergies   Allergen Reactions    Morphine Anaphylaxis    Amlodipine Rash        Social History     Socioeconomic History    Marital status:    Tobacco Use    Smoking status: Every Day     Current packs/day: 0.50     Average packs/day: 0.5 packs/day for 27.0 years (13.5 ttl pk-yrs)     Types: Cigarettes    Smokeless tobacco: Never   Vaping Use    Vaping status: Never Used   Substance and Sexual Activity    Alcohol use: Yes     Comment: rarely    Drug use: Never    Sexual activity: Defer        Tobacco Use: High Risk (1/20/2025)    Patient History     Smoking Tobacco Use: Every Day     Smokeless Tobacco Use: Never     Passive Exposure: Not on file     Patient reports tobacco use. Tobacco use can delay healing and complicate the recovery process. Recommended tobacco cessation for optimal healing and health benefits.     I reviewed the patient's chief complaint, history of present illness, review of systems, past medical history, surgical history, family history, social history, medications, and allergy list.     Review of Systems     Constitutional: Denies fevers, chills, weight loss  Cardiovascular: Denies chest pain, shortness of breath  Skin: Denies rashes, acute skin changes  Neurologic: Denies headache, loss of consciousness    Vital Signs:   /90   Pulse 103   Ht 151.1 cm (59.5\")   Wt 97.5 kg (215 lb)   SpO2 95%   BMI 42.70 kg/m²          Physical Exam  General: Alert. No acute distress    Ortho Exam    Right Knee: Stable valgus/varus. 0 degrees extension. 125 degrees flexion. Mild swelling noted.     Large Joint Arthrocentesis: R " knee  Date/Time: 1/20/2025 3:29 PM  Consent given by: patient  Site marked: site marked  Timeout: Immediately prior to procedure a time out was called to verify the correct patient, procedure, equipment, support staff and site/side marked as required   Supporting Documentation  Indications: pain   Procedure Details  Location: knee - R knee  Needle gauge: 21 G.  Medications administered: 20 mg Sodium Hyaluronate (Viscosup) 20 MG/2ML        Imaging Results (Most Recent)       None             Result Review :       No results found.          Assessment and Plan     Diagnoses and all orders for this visit:    1. Right knee pain, unspecified chronicity (Primary)    2. Acute medial meniscus tear of right knee, subsequent encounter    3. Primary osteoarthritis of right knee    Other orders  -     Large Joint Arthrocentesis: R knee        Patient is previously aware of risks, benefits and alternatives of injections. Patient was informed of possible adverse effects including but not limited to bleeding, damage to nerve, tendon or artery, increased blood sugar and increased blood pressure. Discussed possibility of a reaction from the injection.  Discussed the possibility that the injection may not completely improve or remove the pain.  Discussed the risk of infection.  Discussed the possibility of worsening pain after the injection.  Informed consent obtained.  Time out was performed. Patient was placed with knee in flexion at 90 degrees. Site marked inferior and lateral to patella.  Chlorhexidine was swabbed at injection site per typical technique. Needle injected into bursa, aspiration was performed and then Third right knee Euflexxa injection administered in office today. Needle was removed, band-aid placed to injection site.  Patient tolerated injection well with no complications.        Follow up as needed.     Patient was given instructions and counseling regarding her condition or for health maintenance advice. Please  see specific information pulled into the AVS if appropriate.     Cynthia Brar, ORALIA   01/20/2025  12:36 EST    Dictated Utilizing Dragon Dictation. Please note that portions of this note were completed with a voice recognition program. Part of this note may be an electronic transcription/translation of spoken language to printed text using the Dragon Dictation System.

## 2025-01-21 RX ORDER — HYALURONATE SODIUM 10 MG/ML
20 SYRINGE (ML) INTRAARTICULAR
Status: COMPLETED | OUTPATIENT
Start: 2025-01-20 | End: 2025-01-20

## 2025-01-23 ENCOUNTER — TELEPHONE (OUTPATIENT)
Dept: GASTROENTEROLOGY | Facility: CLINIC | Age: 50
End: 2025-01-23
Payer: COMMERCIAL

## 2025-01-23 NOTE — TELEPHONE ENCOUNTER
Received email from [facility] JOHNATHON.    Beth Mullins [phone]- patient cancelled with PAT- no reason given.      Mailed certified letter to [facility]

## 2025-03-05 DIAGNOSIS — M17.11 PRIMARY OSTEOARTHRITIS OF RIGHT KNEE: ICD-10-CM

## 2025-03-26 ENCOUNTER — OFFICE VISIT (OUTPATIENT)
Dept: ORTHOPEDIC SURGERY | Facility: CLINIC | Age: 50
End: 2025-03-26
Payer: COMMERCIAL

## 2025-03-26 VITALS
BODY MASS INDEX: 42.2 KG/M2 | OXYGEN SATURATION: 98 % | DIASTOLIC BLOOD PRESSURE: 73 MMHG | WEIGHT: 214.95 LBS | SYSTOLIC BLOOD PRESSURE: 132 MMHG | HEART RATE: 101 BPM | HEIGHT: 60 IN

## 2025-03-26 DIAGNOSIS — M25.561 RIGHT KNEE PAIN, UNSPECIFIED CHRONICITY: Primary | ICD-10-CM

## 2025-03-26 DIAGNOSIS — S83.241D ACUTE MEDIAL MENISCUS TEAR OF RIGHT KNEE, SUBSEQUENT ENCOUNTER: ICD-10-CM

## 2025-03-26 DIAGNOSIS — M17.11 PRIMARY OSTEOARTHRITIS OF RIGHT KNEE: ICD-10-CM

## 2025-03-26 RX ORDER — VENLAFAXINE HYDROCHLORIDE 150 MG/1
1 CAPSULE, EXTENDED RELEASE ORAL DAILY
COMMUNITY
Start: 2025-03-10

## 2025-03-26 RX ORDER — TRAZODONE HYDROCHLORIDE 50 MG/1
TABLET ORAL
COMMUNITY
Start: 2025-03-10

## 2025-03-26 RX ORDER — SODIUM PICOSULFATE, MAGNESIUM OXIDE, AND ANHYDROUS CITRIC ACID 12; 3.5; 1 G/175ML; G/175ML; MG/175ML
LIQUID ORAL
COMMUNITY
Start: 2025-01-07

## 2025-03-26 RX ORDER — TOPIRAMATE 100 MG/1
1 TABLET, FILM COATED ORAL DAILY
COMMUNITY
Start: 2025-03-10
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
## 2025-07-02 ENCOUNTER — TELEPHONE (OUTPATIENT)
Dept: ORTHOPEDIC SURGERY | Facility: CLINIC | Age: 50
End: 2025-07-02
Payer: COMMERCIAL
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
## (undated) DEVICE — INTENDED FOR TISSUE SEPARATION, AND OTHER PROCEDURES THAT REQUIRE A SHARP SURGICAL BLADE TO PUNCTURE OR CUT.: Brand: BARD-PARKER ® CARBON RIB-BACK BLADES

## (undated) DEVICE — TOWEL,OR,DSP,ST,BLUE,STD,4/PK,20PK/CS: Brand: MEDLINE

## (undated) DEVICE — GLV SURG SENSICARE PI LF PF 7.0

## (undated) DEVICE — PULLOVER TOGA, 2X LARGE: Brand: FLYTE, SURGICOOL

## (undated) DEVICE — GLV SURG NEOPRN SENSICARE PF SZ/7 LF

## (undated) DEVICE — ANCHORING PIN 20MM/4MM

## (undated) DEVICE — TOTAL KNEE-LF: Brand: MEDLINE INDUSTRIES, INC.

## (undated) DEVICE — MAT FLR ABS W/BLU/LINER 56X72IN WHT

## (undated) DEVICE — SLV SCD LEG COMFORT KENDALLSCD MD REPROC

## (undated) DEVICE — FAN SPRAY KIT: Brand: PULSAVAC®

## (undated) DEVICE — SUT VIC 0 CT1 36IN J946H

## (undated) DEVICE — UNDYED BRAIDED (POLYGLACTIN 910), SYNTHETIC ABSORBABLE SUTURE: Brand: COATED VICRYL

## (undated) DEVICE — INSTRUMENT BATTERY

## (undated) DEVICE — NO-SCRATCH ™ SMALL WHITNEY CURETTE ™ IS A SINGLE-USE, PLASTIC CURETTE FOR QUICKLY APPLYING, MANIPULATING AND REMOVING BONE CEMENT DURING HIP AND KNEE REPLACEMENT SURGERY. THE PLASTIC IS SOFTER THAN STEEL INSTRUMENTS, REDUCING THE RISK OF DAMAGING THE PROSTHESIS WITH METAL INSTRUMENTS.  THE CURETTE’S 6MM TIP REMOVES EXCESS CEMENT FROM REPLACEMENT HIPS AND KNEES. EASY-TO-MANEUVER, THE SMALL BLUE CURETTE LETS YOU REMOVE CEMENT FROM ALL EDGES OF THE PROSTHESIS.NO-SCRATCH WHITNEY SMALL CURETTE FEATURES:SAFER THAN STEEL- MADE OF PLASTIC - STURDY YET SOFTER THAN SURGICAL STEEL.HANDIER- EACH TOOL HAS A MOLDED-IN THUMB INDENTATION INSTANTLY ORIENTING THE TOOL.- EASIER TO MANEUVER IN HARD TO SEE PLACES.- COLOR-CODED FOR EASY IDENTIFICATION.FASTER- COMES INDIVIDUALLY PACKAGED IN STERILE, PEEL OPEN POUCH, READY TO GO.- APPLIES, MANIPULATES, OR REMOVES CEMENT WITH FINGERTIP PRECISION.ECONOMICAL- THE COST OF A SINGLE REVISION DWARFS THE COST OF A SINGLE-USE CURETTE. - DISPOSABLE – THERE’S NO NEED TO WASTE TIME REMOVING HARDENED CEMENT OR RE-STERILIZING TOOLS.- LESS EXPENSIVE TO BUY AND INVENTORY - ORDER ONLY THE TOOL YOU USE.- PACKAGED 25 INDIVIDUALLY WRAPPED TOOLS TO A CARTON FOR CONVENIENT SHELF STORAGE.: Brand: WHITNEY NO-SCRATCH CURETTE (SMALL)

## (undated) DEVICE — DISPOSABLE TOURNIQUET CUFF SINGLE BLADDER, SINGLE PORT AND QUICK CONNECT CONNECTOR: Brand: COLOR CUFF

## (undated) DEVICE — ZIPPERED TOGA, PEEL-AWAY 2X LARGE: Brand: FLYTE, SURGICOOL

## (undated) DEVICE — 3M™ STERI-DRAPE™ U-DRAPE 1015: Brand: STERI-DRAPE™

## (undated) DEVICE — DRSNG PAD ABD 8X10IN STRL

## (undated) DEVICE — SUT VIC UD BR COAT 0 CP2 27IN

## (undated) DEVICE — STERILE POLYISOPRENE POWDER-FREE SURGICAL GLOVES: Brand: PROTEXIS

## (undated) DEVICE — SUT ETHIB 1 X538H ETX538H

## (undated) DEVICE — PROXIMATE RH ROTATING HEAD SKIN STAPLERS (35 WIDE) CONTAINS 35 STAINLESS STEEL STAPLES: Brand: PROXIMATE

## (undated) DEVICE — SOL IRR NACL 0.9PCT 3000ML

## (undated) DEVICE — SYR LUERLOK 30CC

## (undated) DEVICE — STRYKER PERFORMANCE SERIES SAGITTAL BLADE: Brand: STRYKER PERFORMANCE SERIES

## (undated) DEVICE — NDL HYPO ECLPS SFTY 18G 1 1/2IN

## (undated) DEVICE — GLV SURG ULTRAFREE MAX LTX PF 8

## (undated) DEVICE — 450 ML BOTTLE OF 0.05% CHLORHEXIDINE GLUCONATE IN 99.95% STERILE WATER FOR IRRIGATION, USP AND APPLICATOR.: Brand: IRRISEPT ANTIMICROBIAL WOUND LAVAGE

## (undated) DEVICE — CVR LEG BOOTLEG F/R NOSKID 33IN

## (undated) DEVICE — APPL CHLORAPREP HI/LITE 26ML ORNG

## (undated) DEVICE — 3 BONE CEMENT MIXER: Brand: MIXEVAC

## (undated) DEVICE — ENCORE® LATEX ORTHO SIZE 8, STERILE LATEX POWDER-FREE SURGICAL GLOVE: Brand: ENCORE